# Patient Record
Sex: FEMALE | HISPANIC OR LATINO | Employment: PART TIME
[De-identification: names, ages, dates, MRNs, and addresses within clinical notes are randomized per-mention and may not be internally consistent; named-entity substitution may affect disease eponyms.]

---

## 2017-08-12 ENCOUNTER — HEALTH MAINTENANCE LETTER (OUTPATIENT)
Age: 32
End: 2017-08-12

## 2018-04-23 ENCOUNTER — PRENATAL OFFICE VISIT (OUTPATIENT)
Dept: OBGYN | Facility: CLINIC | Age: 33
End: 2018-04-23
Payer: COMMERCIAL

## 2018-04-23 ENCOUNTER — RADIANT APPOINTMENT (OUTPATIENT)
Dept: ULTRASOUND IMAGING | Facility: CLINIC | Age: 33
End: 2018-04-23
Payer: COMMERCIAL

## 2018-04-23 ENCOUNTER — NURSE TRIAGE (OUTPATIENT)
Dept: NURSING | Facility: CLINIC | Age: 33
End: 2018-04-23

## 2018-04-23 ENCOUNTER — TELEPHONE (OUTPATIENT)
Dept: NURSING | Facility: CLINIC | Age: 33
End: 2018-04-23

## 2018-04-23 VITALS
HEIGHT: 65 IN | BODY MASS INDEX: 29.46 KG/M2 | HEART RATE: 69 BPM | SYSTOLIC BLOOD PRESSURE: 125 MMHG | DIASTOLIC BLOOD PRESSURE: 67 MMHG | WEIGHT: 176.8 LBS

## 2018-04-23 DIAGNOSIS — O36.70X0 MATERNAL CARE FOR VIABLE FETUS IN ABDOMINAL PREGNANCY: ICD-10-CM

## 2018-04-23 DIAGNOSIS — N39.0 UTI (URINARY TRACT INFECTION): Primary | ICD-10-CM

## 2018-04-23 DIAGNOSIS — Z12.4 SCREENING FOR CERVICAL CANCER: ICD-10-CM

## 2018-04-23 DIAGNOSIS — Z34.81 SUPERVISION OF NORMAL INTRAUTERINE PREGNANCY IN MULTIGRAVIDA, FIRST TRIMESTER: Primary | ICD-10-CM

## 2018-04-23 PROBLEM — Z34.80 SUPERVISION OF NORMAL INTRAUTERINE PREGNANCY IN MULTIGRAVIDA: Status: ACTIVE | Noted: 2018-04-23

## 2018-04-23 LAB
ABO + RH BLD: NORMAL
ABO + RH BLD: NORMAL
ALBUMIN UR-MCNC: ABNORMAL MG/DL
APPEARANCE UR: ABNORMAL
BACTERIA #/AREA URNS HPF: ABNORMAL /HPF
BASOPHILS # BLD AUTO: 0 10E9/L (ref 0–0.2)
BASOPHILS NFR BLD AUTO: 0.2 %
BILIRUB UR QL STRIP: NEGATIVE
BLD GP AB SCN SERPL QL: NORMAL
BLOOD BANK CMNT PATIENT-IMP: NORMAL
COLOR UR AUTO: YELLOW
DIFFERENTIAL METHOD BLD: NORMAL
EOSINOPHIL # BLD AUTO: 0.5 10E9/L (ref 0–0.7)
EOSINOPHIL NFR BLD AUTO: 5 %
ERYTHROCYTE [DISTWIDTH] IN BLOOD BY AUTOMATED COUNT: 12.3 % (ref 10–15)
GLUCOSE UR STRIP-MCNC: NEGATIVE MG/DL
HCT VFR BLD AUTO: 39.1 % (ref 35–47)
HGB BLD-MCNC: 13.3 G/DL (ref 11.7–15.7)
HGB UR QL STRIP: NEGATIVE
KETONES UR STRIP-MCNC: ABNORMAL MG/DL
LEUKOCYTE ESTERASE UR QL STRIP: NEGATIVE
LYMPHOCYTES # BLD AUTO: 2.2 10E9/L (ref 0.8–5.3)
LYMPHOCYTES NFR BLD AUTO: 22.4 %
MCH RBC QN AUTO: 30.1 PG (ref 26.5–33)
MCHC RBC AUTO-ENTMCNC: 34 G/DL (ref 31.5–36.5)
MCV RBC AUTO: 89 FL (ref 78–100)
MONOCYTES # BLD AUTO: 0.6 10E9/L (ref 0–1.3)
MONOCYTES NFR BLD AUTO: 6.4 %
NEUTROPHILS # BLD AUTO: 6.6 10E9/L (ref 1.6–8.3)
NEUTROPHILS NFR BLD AUTO: 66 %
NITRATE UR QL: POSITIVE
NON-SQ EPI CELLS #/AREA URNS LPF: ABNORMAL /LPF
PH UR STRIP: 6.5 PH (ref 5–7)
PLATELET # BLD AUTO: 311 10E9/L (ref 150–450)
RBC # BLD AUTO: 4.42 10E12/L (ref 3.8–5.2)
RBC #/AREA URNS AUTO: ABNORMAL /HPF
SOURCE: ABNORMAL
SP GR UR STRIP: 1.02 (ref 1–1.03)
SPECIMEN EXP DATE BLD: NORMAL
UROBILINOGEN UR STRIP-ACNC: 1 EU/DL (ref 0.2–1)
WBC # BLD AUTO: 9.9 10E9/L (ref 4–11)
WBC #/AREA URNS AUTO: ABNORMAL /HPF

## 2018-04-23 PROCEDURE — 86762 RUBELLA ANTIBODY: CPT | Performed by: OBSTETRICS & GYNECOLOGY

## 2018-04-23 PROCEDURE — 87591 N.GONORRHOEAE DNA AMP PROB: CPT | Performed by: OBSTETRICS & GYNECOLOGY

## 2018-04-23 PROCEDURE — 76801 OB US < 14 WKS SINGLE FETUS: CPT | Performed by: OBSTETRICS & GYNECOLOGY

## 2018-04-23 PROCEDURE — 87491 CHLMYD TRACH DNA AMP PROBE: CPT | Performed by: OBSTETRICS & GYNECOLOGY

## 2018-04-23 PROCEDURE — 87086 URINE CULTURE/COLONY COUNT: CPT | Performed by: OBSTETRICS & GYNECOLOGY

## 2018-04-23 PROCEDURE — 2894A VOIDCORRECT: CPT | Performed by: OBSTETRICS & GYNECOLOGY

## 2018-04-23 PROCEDURE — 87624 HPV HI-RISK TYP POOLED RSLT: CPT | Performed by: OBSTETRICS & GYNECOLOGY

## 2018-04-23 PROCEDURE — 87088 URINE BACTERIA CULTURE: CPT | Performed by: OBSTETRICS & GYNECOLOGY

## 2018-04-23 PROCEDURE — 87186 SC STD MICRODIL/AGAR DIL: CPT | Performed by: OBSTETRICS & GYNECOLOGY

## 2018-04-23 PROCEDURE — 81001 URINALYSIS AUTO W/SCOPE: CPT | Performed by: OBSTETRICS & GYNECOLOGY

## 2018-04-23 PROCEDURE — 86850 RBC ANTIBODY SCREEN: CPT | Performed by: OBSTETRICS & GYNECOLOGY

## 2018-04-23 PROCEDURE — 86900 BLOOD TYPING SEROLOGIC ABO: CPT | Performed by: OBSTETRICS & GYNECOLOGY

## 2018-04-23 PROCEDURE — 87389 HIV-1 AG W/HIV-1&-2 AB AG IA: CPT | Performed by: OBSTETRICS & GYNECOLOGY

## 2018-04-23 PROCEDURE — 99207 ZZC FIRST OB VISIT: CPT | Performed by: OBSTETRICS & GYNECOLOGY

## 2018-04-23 PROCEDURE — G0145 SCR C/V CYTO,THINLAYER,RESCR: HCPCS | Performed by: OBSTETRICS & GYNECOLOGY

## 2018-04-23 PROCEDURE — 87340 HEPATITIS B SURFACE AG IA: CPT | Performed by: OBSTETRICS & GYNECOLOGY

## 2018-04-23 PROCEDURE — 36415 COLL VENOUS BLD VENIPUNCTURE: CPT | Performed by: OBSTETRICS & GYNECOLOGY

## 2018-04-23 PROCEDURE — 85025 COMPLETE CBC W/AUTO DIFF WBC: CPT | Performed by: OBSTETRICS & GYNECOLOGY

## 2018-04-23 PROCEDURE — 86780 TREPONEMA PALLIDUM: CPT | Performed by: OBSTETRICS & GYNECOLOGY

## 2018-04-23 PROCEDURE — 86901 BLOOD TYPING SEROLOGIC RH(D): CPT | Performed by: OBSTETRICS & GYNECOLOGY

## 2018-04-23 RX ORDER — CEPHALEXIN 500 MG/1
500 CAPSULE ORAL 3 TIMES DAILY
Qty: 21 CAPSULE | Refills: 0 | Status: SHIPPED | OUTPATIENT
Start: 2018-04-23 | End: 2018-05-07

## 2018-04-23 ASSESSMENT — PATIENT HEALTH QUESTIONNAIRE - PHQ9: 5. POOR APPETITE OR OVEREATING: SEVERAL DAYS

## 2018-04-23 ASSESSMENT — ANXIETY QUESTIONNAIRES
IF YOU CHECKED OFF ANY PROBLEMS ON THIS QUESTIONNAIRE, HOW DIFFICULT HAVE THESE PROBLEMS MADE IT FOR YOU TO DO YOUR WORK, TAKE CARE OF THINGS AT HOME, OR GET ALONG WITH OTHER PEOPLE: SOMEWHAT DIFFICULT
6. BECOMING EASILY ANNOYED OR IRRITABLE: NOT AT ALL
1. FEELING NERVOUS, ANXIOUS, OR ON EDGE: SEVERAL DAYS
2. NOT BEING ABLE TO STOP OR CONTROL WORRYING: MORE THAN HALF THE DAYS
GAD7 TOTAL SCORE: 8
5. BEING SO RESTLESS THAT IT IS HARD TO SIT STILL: NOT AT ALL
3. WORRYING TOO MUCH ABOUT DIFFERENT THINGS: MORE THAN HALF THE DAYS
7. FEELING AFRAID AS IF SOMETHING AWFUL MIGHT HAPPEN: MORE THAN HALF THE DAYS

## 2018-04-23 NOTE — LETTER
May 1, 2018    Felipa Cyr  [address]  [facility] [address]    Dear Felipa,  We are happy to inform you that your PAP smear result from 4/23/18 is normal.  We are now able to do a follow up test on PAP smears. The DNA test is for HPV (Human Papilloma Virus). Cervical cancer is closely linked with certain types of HPV. Your results showed no evidence of high risk HPV.  Therefore we recommend you return in 3 years for your next pap smear.  You will still need to return to the clinic every year for an annual exam and other preventive tests.  Please contact the clinic at [phone] with any questions.  Sincerely,    Jodi Joel MD/naida

## 2018-04-23 NOTE — MR AVS SNAPSHOT
After Visit Summary   4/23/2018    Felipa Cyr    MRN: [id number]           Patient Information     Date Of Birth          1985        Visit Information        Provider Department      4/23/2018 2:00 PM Jodi Joel MD; WE TRIAGE [facility]        Today's Diagnoses     Supervision of normal intrauterine pregnancy in multigravida, first trimester    -  1    Subchorionic hemorrhage of placenta in first trimester, single or unspecified fetus        Screening for cervical cancer           Follow-ups after your visit        Follow-up notes from your care team     Return in about 2 weeks (around 5/7/2018).      Your next 10 appointments already scheduled     May 07, 2018 11:40 AM CDT   ESTABLISHED PRENATAL with Jodi Joel MD   [facility] ([facility])    [address] [place] [id number] [phone]              Future tests that were ordered for you today     Open Future Orders        Priority Expected Expires Ordered    US OB Ltd One Or More Fetus FU/Repeat Routine  4/23/2019 4/23/2018            Who to contact     If you have questions or need follow up information about today's clinic visit or your schedule please contact [facility] [place] directly at [phone].  Normal or non-critical lab and imaging results will be communicated to you by MyChart, letter or phone within 4 business days after the clinic has received the results. If you do not hear from us within 7 days, please contact the clinic through MyChart or phone. If you have a critical or abnormal lab result, we will notify you by phone as soon as possible.  Submit refill requests through "[employer]" or call your pharmacy and they will forward the refill request to us. Please allow 3 business days for your refill to be completed.          Additional Information About Your Visit        MyChart Information   "   [employer] lets you send messages to your doctor, view your test results, renew your prescriptions, schedule appointments and more. To sign up, go to [email or web address] . Click on \"Log in\" on the left side of the screen, which will take you to the Welcome page. Then click on \"Sign up Now\" on the right side of the page.     You will be asked to enter the access code listed below, as well as some personal information. Please follow the directions to create your username and password.     Your access code is: [email or web address]  Expires: 2018  5:45 PM     Your access code will  in 90 days. If you need help or a new code, please call your [place] clinic or [phone].        Care EveryWhere ID     This is your Care EveryWhere ID. This could be used by other organizations to access your [place] medical records  [id number]        Your Vitals Were     Pulse Height Last Period BMI (Body Mass Index)          69 5' 5\" (1.651 m) 2018 29.42 kg/m2         Blood Pressure from Last 3 Encounters:   18 125/67   10/22/14 110/80   05 94/50    Weight from Last 3 Encounters:   18 176 lb 12.8 oz (80.2 kg)   10/22/14 138 lb 3.2 oz (62.7 kg)   05 143 lb (64.9 kg)              We Performed the Following     ABO/Rh type and screen     Anti Treponema     CBC with platelets differential     Chlamydia trachomatis PCR     Hepatitis B surface antigen     HIV Antigen Antibody Combo     HPV High Risk Types DNA Cervical     Neisseria gonorrhoeae PCR     Pap imaged thin layer screen with HPV - recommended age 30 - 65 years (select HPV order below)     Rubella Antibody IgG Quantitative     UA with Microscopic     Urine Culture Aerobic Bacterial        Primary Care Provider Office Phone # Fax #    Dimple Arredondo [phone]       [facility] [address]  [place] [address]        Equal Access to Services     BRO CHAN AH: Hadii tatiana Cerda " reynaldo lopez waxpeter enricoin hayaaaraceli cardenasmicky isauramaribel lajaredaraceli chelly. So [facility] [phone].    ATENCIÓN: Si leyda vegas, tiene a jones disposición servicios gratuitos de asistencia lingüística. Llame al [phone].    We comply with applicable federal civil rights laws and [place] laws. We do not discriminate on the basis of race, color, national origin, age, disability, sex, sexual orientation, or gender identity.            Thank you!     Thank you for choosing [facility]  for your care. Our goal is always to provide you with excellent care. Hearing back from our patients is one way we can continue to improve our services. Please take a few minutes to complete the written survey that you may receive in the mail after your visit with us. Thank you!             Your Updated Medication List - Protect others around you: Learn how to safely use, store and throw away your medicines at www.disposemymeds.org.          This list is accurate as of 4/23/18  5:45 PM.  Always use your most recent med list.                   Brand Name Dispense Instructions for use Diagnosis    IBUPROFEN PO      Take 200 mg by mouth as needed for moderate pain
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
## 2018-04-23 NOTE — TELEPHONE ENCOUNTER
Pt returning call from clinic and reached [facility] instead. Declined triage. Discussed provider note below:    Result Notes   Notes Recorded by Jodi Joel MD on 4/23/2018 at 4:35 PM  Please call the patient with the results. Positive nitrite in the urine with suprapubic pain. Please call patient and send Cephalexin 500mg TID  For 7 days. Thanks!     Jodi Joel MD     This nurse to send Cephalexin script to [facility] at [id number], [address] [place] [address], [phone] per patient request. Educated to nitrites in urine and what a positive result can mean (possible UTI). Caller had no further questions.    Encouraged call back to [facility] 24/7 for nurse line services, new/worsening symptoms or further questions.    Jessica Douglas RN  [place] [facility]      Additional Information    Negative: [1] Caller is not with the adult (patient) AND [2] reporting urgent symptoms    Negative: Lab result questions    Negative: Medication questions    Negative: Caller cannot be reached by phone    Negative: Caller has already spoken to PCP or another triager    Negative: RN needs further essential information from caller in order to complete triage    Negative: Requesting regular office appointment    Negative: [1] Caller requesting NON-URGENT health information AND [2] PCP's office is the best resource    Negative: Health Information question, no triage required and triager able to answer question    Negative: General information question, no triage required and triager able to answer question    Negative: Question about upcoming scheduled test, no triage required and triager able to answer question    Negative: [1] Caller is not with the adult (patient) AND [2] probable NON-URGENT symptoms    [1] Follow-up call to recent contact AND [2] information only call, no triage required     Pt returning call from clinic. Declined triage.    Protocols used: IN[facility] ONLY [facility]

## 2018-04-23 NOTE — TELEPHONE ENCOUNTER
Pt returning call from clinic and reached [facility] instead. Declined triage. Discussed provider note below:    Result Notes   Notes Recorded by Jodi Joel MD on 4/23/2018 at 4:35 PM  Please call the patient with the results. Positive nitrite in the urine with suprapubic pain. Please call patient and send Cephalexin 500mg TID  For 7 days. Thanks!     Jodi Joel MD    This nurse to send Cephalexin script to [facility] at [id number], [address] [place] [address], [phone] per patient request. Educated to nitrites in urine and what a positive result can mean (possible UTI). Caller had no further questions.    Encouraged call back to [facility] 24/7 for nurse line services, new/worsening symptoms or further questions.    Jessica Douglas RN  [place] [facility]

## 2018-04-23 NOTE — TELEPHONE ENCOUNTER
Left message informing pt. Not able to send , need pharmacy. LMTCB      Please call the patient with the results. Positive nitrite in the urine with suprapubic pain. Please call patient and send Cephalexin 500mg TID  For 7 days. Thanks!

## 2018-04-23 NOTE — NURSING NOTE
[facility] Obstetrical Risk History    Patient presents for new OB labs and teaching.      1. Please indicate any condition you have or have had in the past:  Herpes  2. Do you smoke?  No  If yes, how many packs/day?   3. Do you drink alcoholic beverages? No  If yes, how often?  What type?   4. List any medications taken since your last period: Ibuprofen  5.   6. List any recreational drugs (cocaine, marijuana, etc. used since your last period:None    7. List any chemical or radiation exposure that you've encountered: None  8. Are you on a restricted diet? No  If yes, please describe:  Do you have any Nondenominational objections to any form of treatment? No    GENETIC SCREENING    These questions apply to you, the baby's father, or anyone in either family with:    1. Patient's age 35 or greater at delivery? No  2. Kazakh, Portuguese, Mediterranean ancestry? No  3. Neural Tube Defect (Meningomyelocele, Spina Bifida, or Anencephaly)? No  4. Uatsdin, Nepali Luxembourger or history of Frank-Sachs disease? No  5. Down's Syndrome?   No  6. Hemophilia or clotting disorder? No  7. Muscular Dystrophy? No  8. Cystic Fibrosis? No  9. [place]'s Chorea? No  10. Mental Retardation? No  11. 3 or more miscarriages or a stillborn? No  12. Other inherited disease or chromosomal disorder? No  13. Have you or the baby's father had a child born with a birth defect? No  14. Did you or the baby's father have a birth defect yourselves? No    Do you have any other concerns about birth defects? No

## 2018-04-24 ENCOUNTER — TELEPHONE (OUTPATIENT)
Dept: NURSING | Facility: CLINIC | Age: 33
End: 2018-04-24

## 2018-04-24 LAB
HBV SURFACE AG SERPL QL IA: NONREACTIVE
HIV 1+2 AB+HIV1 P24 AG SERPL QL IA: NONREACTIVE

## 2018-04-24 ASSESSMENT — PATIENT HEALTH QUESTIONNAIRE - PHQ9: SUM OF ALL RESPONSES TO PHQ QUESTIONS 1-9: 5

## 2018-04-24 ASSESSMENT — ANXIETY QUESTIONNAIRES: GAD7 TOTAL SCORE: 8

## 2018-04-24 NOTE — TELEPHONE ENCOUNTER
Pt calling to make sure Rx sent for UTI is OK with pregnancy. Pt is concerned about trying to get large pills down. Informed pt to take with pudding or yogurt. No further questions.

## 2018-04-25 LAB
RUBV IGG SERPL IA-ACNC: 31 IU/ML
T PALLIDUM IGG+IGM SER QL: NEGATIVE

## 2018-04-26 LAB
COPATH REPORT: NORMAL
PAP: NORMAL

## 2018-04-27 LAB
BACTERIA SPEC CULT: ABNORMAL
BACTERIA SPEC CULT: ABNORMAL
C TRACH DNA SPEC QL NAA+PROBE: NEGATIVE
Lab: ABNORMAL
N GONORRHOEA DNA SPEC QL NAA+PROBE: NEGATIVE
SPECIMEN SOURCE: ABNORMAL
SPECIMEN SOURCE: NORMAL
SPECIMEN SOURCE: NORMAL

## 2018-04-30 LAB
FINAL DIAGNOSIS: NORMAL
HPV HR 12 DNA CVX QL NAA+PROBE: NEGATIVE
HPV16 DNA SPEC QL NAA+PROBE: NEGATIVE
HPV18 DNA SPEC QL NAA+PROBE: NEGATIVE
SPECIMEN DESCRIPTION: NORMAL
SPECIMEN SOURCE CVX/VAG CYTO: NORMAL

## 2018-05-07 ENCOUNTER — PRENATAL OFFICE VISIT (OUTPATIENT)
Dept: OBGYN | Facility: CLINIC | Age: 33
End: 2018-05-07
Payer: COMMERCIAL

## 2018-05-07 ENCOUNTER — TRANSFERRED RECORDS (OUTPATIENT)
Dept: HEALTH INFORMATION MANAGEMENT | Facility: CLINIC | Age: 33
End: 2018-05-07

## 2018-05-07 VITALS — BODY MASS INDEX: 29.29 KG/M2 | SYSTOLIC BLOOD PRESSURE: 116 MMHG | WEIGHT: 176 LBS | DIASTOLIC BLOOD PRESSURE: 70 MMHG

## 2018-05-07 DIAGNOSIS — O23.41 URINARY TRACT INFECTION IN MOTHER DURING FIRST TRIMESTER OF PREGNANCY: ICD-10-CM

## 2018-05-07 DIAGNOSIS — Z34.81 SUPERVISION OF NORMAL INTRAUTERINE PREGNANCY IN MULTIGRAVIDA IN FIRST TRIMESTER: Primary | ICD-10-CM

## 2018-05-07 LAB — MISCELLANEOUS TEST: NORMAL

## 2018-05-07 PROCEDURE — 40000791 ZZHCL STATISTIC VERIFI PRENATAL TRISOMY 21,18,13: Mod: 90 | Performed by: OBSTETRICS & GYNECOLOGY

## 2018-05-07 PROCEDURE — 36415 COLL VENOUS BLD VENIPUNCTURE: CPT | Performed by: OBSTETRICS & GYNECOLOGY

## 2018-05-07 PROCEDURE — 99000 SPECIMEN HANDLING OFFICE-LAB: CPT | Performed by: OBSTETRICS & GYNECOLOGY

## 2018-05-07 PROCEDURE — 99207 ZZC PRENATAL VISIT: CPT | Performed by: OBSTETRICS & GYNECOLOGY

## 2018-05-07 PROCEDURE — 87086 URINE CULTURE/COLONY COUNT: CPT | Performed by: OBSTETRICS & GYNECOLOGY

## 2018-05-07 NOTE — MR AVS SNAPSHOT
"              After Visit Summary   5/7/2018    Felipa Cyr    MRN: [id number]           Patient Information     Date Of Birth          1985        Visit Information        Provider Department      5/7/2018 11:40 AM Jodi Joel MD [facility]        Today's Diagnoses     Supervision of normal intrauterine pregnancy in multigravida in first trimester    -  1    UTI in pregnancy           Follow-ups after your visit        Your next 10 appointments already scheduled     May 29, 2018  4:20 PM CDT   ESTABLISHED PRENATAL with Jodi Joel MD   [facility] ([facility])    [address] [place] [address] [phone]              Who to contact     If you have questions or need follow up information about today's clinic visit or your schedule please contact [facility] directly at [phone].  Normal or non-critical lab and imaging results will be communicated to you by MyChart, letter or phone within 4 business days after the clinic has received the results. If you do not hear from us within 7 days, please contact the clinic through [employer] or phone. If you have a critical or abnormal lab result, we will notify you by phone as soon as possible.  Submit refill requests through [employer] or call your pharmacy and they will forward the refill request to us. Please allow 3 business days for your refill to be completed.          Additional Information About Your Visit        Rheti Inchart Information     [employer] lets you send messages to your doctor, view your test results, renew your prescriptions, schedule appointments and more. To sign up, go to [email or web address] . Click on \"Log in\" on the left side of the screen, which will take you to the Welcome page. Then click on \"Sign up Now\" on the right side of the page.     You will be asked to enter the access code listed below, as " well as some personal information. Please follow the directions to create your username and password.     Your access code is: [email or web address]  Expires: 2018  5:45 PM     Your access code will  in 90 days. If you need help or a new code, please call your [place] clinic or [phone].        Care EveryWhere ID     This is your Care EveryWhere ID. This could be used by other organizations to access your [place] medical records  [id number]        Your Vitals Were     Last Period Breastfeeding? BMI (Body Mass Index)             2018 No 29.29 kg/m2          Blood Pressure from Last 3 Encounters:   18 116/70   18 125/67   10/22/14 110/80    Weight from Last 3 Encounters:   18 176 lb (79.8 kg)   18 176 lb 12.8 oz (80.2 kg)   10/22/14 138 lb 3.2 oz (62.7 kg)              We Performed the Following     Non Invasive Prenatal Test Cell Free DNA     Send outs misc test     Standard panel: Laboratory Miscellaneous Order     Urine Culture Aerobic Bacterial        Primary Care Provider Office Phone # Fax #    Belle Gladerigoberto Arredondo [phone]       [facility] [address] [address]        Equal Access to Services     RICA CHAN AH: Hadii aad ku hadasho Soomaali, waaxda luqadaha, qaybta kaalmada adeegyada, cailin mckee hayphilip nye . So [facility] [phone].    ATENCIÓN: Si habla español, tiene a jones disposición servicios gratuitos de asistencia lingüística. Llame al [phone].    We comply with applicable federal civil rights laws and [place] laws. We do not discriminate on the basis of race, color, national origin, age, disability, sex, sexual orientation, or gender identity.            Thank you!     Thank you for choosing [facility]  for your care. Our goal is always to provide you with excellent care. Hearing back from our patients is one way we can continue to improve our services. Please take a  few minutes to complete the written survey that you may receive in the mail after your visit with us. Thank you!             Your Updated Medication List - Protect others around you: Learn how to safely use, store and throw away your medicines at www.disposemymeds.org.          This list is accurate as of 5/7/18 12:19 PM.  Always use your most recent med list.                   Brand Name Dispense Instructions for use Diagnosis    IBUPROFEN PO      Take 200 mg by mouth as needed for moderate pain
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
## 2018-05-08 LAB
BACTERIA SPEC CULT: NO GROWTH
Lab: NORMAL
SPECIMEN SOURCE: NORMAL

## 2018-05-15 LAB
LOCATION PERFORMED: NORMAL
NON INVASIVE PRENATAL TEST CELL FREE DNA: NORMAL
NORMAL RANGE FOR SEND OUTS MISC TEST: NORMAL
RESULT: NORMAL
SEND OUTS MISC TEST CODE: NORMAL
SEND OUTS MISC TEST SPECIMEN: NORMAL
TEST NAME: NORMAL

## 2018-05-16 ENCOUNTER — TELEPHONE (OUTPATIENT)
Dept: NURSING | Facility: CLINIC | Age: 33
End: 2018-05-16

## 2018-05-16 DIAGNOSIS — Z34.80 SUPERVISION OF NORMAL INTRAUTERINE PREGNANCY IN MULTIGRAVIDA: ICD-10-CM

## 2018-05-16 NOTE — TELEPHONE ENCOUNTER
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 Pt verbalized understanding and no questions at this time.  Routing to Provider as CARLINE RG RN

## 2018-05-16 NOTE — TELEPHONE ENCOUNTER
LMTCB on voicemail for progenity screen results. Due to CTC did reassure her that results are normal but to call back to discuss more information. DID NOT reveal sex. Harry RG RN

## 2018-05-29 ENCOUNTER — PRENATAL OFFICE VISIT (OUTPATIENT)
Dept: OBGYN | Facility: CLINIC | Age: 33
End: 2018-05-29
Payer: COMMERCIAL

## 2018-05-29 VITALS — WEIGHT: 176 LBS | SYSTOLIC BLOOD PRESSURE: 112 MMHG | BODY MASS INDEX: 29.29 KG/M2 | DIASTOLIC BLOOD PRESSURE: 64 MMHG

## 2018-05-29 DIAGNOSIS — Z34.82 SUPERVISION OF NORMAL INTRAUTERINE PREGNANCY IN MULTIGRAVIDA IN SECOND TRIMESTER: ICD-10-CM

## 2018-05-29 PROCEDURE — 99207 ZZC PRENATAL VISIT: CPT | Performed by: OBSTETRICS & GYNECOLOGY

## 2018-05-29 NOTE — PROGRESS NOTES
Doing well. Here with the FOB today--TC. Having stretching pain in the upper abdomen. Reassurance given. Asked to use Simethicone at night time. RTC in 4 weeks. AFP at the next visit. Anatomy sonogram at 20 weeks.  Jodi Joel MD

## 2018-05-29 NOTE — MR AVS SNAPSHOT
"              After Visit Summary   5/29/2018    Felipa Cyr    MRN: [id number]           Patient Information     Date Of Birth          1985        Visit Information        Provider Department      5/29/2018 4:20 PM Jodi Joel MD [facility] [place]        Today's Diagnoses     Supervision of normal intrauterine pregnancy in multigravida in second trimester           Follow-ups after your visit        Follow-up notes from your care team     Return in about 4 weeks (around 6/26/2018).      Your next 10 appointments already scheduled     Jun 19, 2018  8:30 AM CDT   ESTABLISHED PRENATAL with Jodi Joel MD   [facility] [address] [facility] ([facility])    [address] [place] [address] [id number] [phone]              Who to contact     If you have questions or need follow up information about today's clinic visit or your schedule please contact [facility] [place] directly at [phone].  Normal or non-critical lab and imaging results will be communicated to you by Hop Skip Connecthart, letter or phone within 4 business days after the clinic has received the results. If you do not hear from us within 7 days, please contact the clinic through Adlyt or phone. If you have a critical or abnormal lab result, we will notify you by phone as soon as possible.  Submit refill requests through I Just Shared or call your pharmacy and they will forward the refill request to us. Please allow 3 business days for your refill to be completed.          Additional Information About Your Visit        Hop Skip Connecthart Information     I Just Shared lets you send messages to your doctor, view your test results, renew your prescriptions, schedule appointments and more. To sign up, go to [email or web address] . Click on \"Log in\" on the left side of the screen, which will take you to the Welcome page. Then click on \"Sign up Now\" on the right side of the page. "     You will be asked to enter the access code listed below, as well as some personal information. Please follow the directions to create your username and password.     Your access code is: [email or web address]  Expires: 2018  3:52 PM     Your access code will  in 90 days. If you need help or a new code, please call your [place] clinic or [phone].        Care EveryWhere ID     This is your Care EveryWhere ID. This could be used by other organizations to access your [place] medical records  [id number]        Your Vitals Were     Last Period BMI (Body Mass Index)                2018 29.29 kg/m2           Blood Pressure from Last 3 Encounters:   18 112/64   18 116/70   18 125/67    Weight from Last 3 Encounters:   18 176 lb (79.8 kg)   18 176 lb (79.8 kg)   18 176 lb 12.8 oz (80.2 kg)              Today, you had the following     No orders found for display       Primary Care Provider Office Phone # Fax #    Dimple Arredondo [phone]       [facility] [address] [address]        Equal Access to Services     [facility] : Hadii aad ku hadasho Soomaali, waaxda luqadaha, qaybta kaalmada adeegyada, waxay rafi nye . So [facility] [phone].    ATENCIÓN: Si habla español, tiene a jones disposición servicios gratuitos de asistencia lingüística. Llame al [phone].    We comply with applicable federal civil rights laws and [place] laws. We do not discriminate on the basis of race, color, national origin, age, disability, sex, sexual orientation, or gender identity.            Thank you!     Thank you for choosing [facility]  for your care. Our goal is always to provide you with excellent care. Hearing back from our patients is one way we can continue to improve our services. Please take a few minutes to complete the written survey that you may receive in the mail after  your visit with us. Thank you!             Your Updated Medication List - Protect others around you: Learn how to safely use, store and throw away your medicines at www.disposemymeds.org.          This list is accurate as of 5/29/18  4:43 PM.  Always use your most recent med list.                   Brand Name Dispense Instructions for use Diagnosis    PRENATAL VITAMIN PO

## 2018-06-11 ENCOUNTER — TELEPHONE (OUTPATIENT)
Dept: OBGYN | Facility: CLINIC | Age: 33
End: 2018-06-11

## 2018-06-11 NOTE — TELEPHONE ENCOUNTER
Returned pt call regarding URI sx's: headaches, sore through and aches. Denies fever.  Inquiring what she can take.   Advised on Tylenol every 4-6 hours depending on regular or extra strength, making sure not to exceed 4000 gm over 24 hour period.  Sore throat: warm salt water gargles, throat lozenges ok. Sudafed ok for head or nasal congestion. If allergy related may take benadryl, claritin or zyrtec.  Encouraged to increase PO fluids, juice such as OJ is ok for vit c and rest.  Pt verbalized understanding and no further questions.  Flory RG RN

## 2018-06-19 ENCOUNTER — PRENATAL OFFICE VISIT (OUTPATIENT)
Dept: OBGYN | Facility: CLINIC | Age: 33
End: 2018-06-19
Payer: COMMERCIAL

## 2018-06-19 VITALS — WEIGHT: 177 LBS | DIASTOLIC BLOOD PRESSURE: 68 MMHG | SYSTOLIC BLOOD PRESSURE: 118 MMHG | BODY MASS INDEX: 29.45 KG/M2

## 2018-06-19 DIAGNOSIS — Z34.82 SUPERVISION OF NORMAL INTRAUTERINE PREGNANCY IN MULTIGRAVIDA IN SECOND TRIMESTER: Primary | ICD-10-CM

## 2018-06-19 PROCEDURE — 99207 ZZC PRENATAL VISIT: CPT | Performed by: OBSTETRICS & GYNECOLOGY

## 2018-06-19 PROCEDURE — 99000 SPECIMEN HANDLING OFFICE-LAB: CPT | Performed by: OBSTETRICS & GYNECOLOGY

## 2018-06-19 PROCEDURE — 82105 ALPHA-FETOPROTEIN SERUM: CPT | Mod: 90 | Performed by: OBSTETRICS & GYNECOLOGY

## 2018-06-19 PROCEDURE — 36415 COLL VENOUS BLD VENIPUNCTURE: CPT | Performed by: OBSTETRICS & GYNECOLOGY

## 2018-06-19 NOTE — MR AVS SNAPSHOT
After Visit Summary   6/19/2018    Felipa Cyr    MRN: [id number]           Patient Information     Date Of Birth          1985        Visit Information        Provider Department      6/19/2018 8:30 AM Jodi Joel MD [facility] [place]        Today's Diagnoses     Supervision of normal intrauterine pregnancy in multigravida in second trimester    -  1       Follow-ups after your visit        Follow-up notes from your care team     Return in about 4 weeks (around 7/17/2018).      Your next 10 appointments already scheduled     Jul 17, 2018  3:00 PM CDT   US OB > 14 WEEKS COMPLETE SINGLE with WEUS2   [facility] ([facility])    [address]  [facility] [id number] [phone]           Please bring a list of your medicines (including vitamins, minerals and over-the-counter drugs). Also, tell your doctor about any allergies you may have. Wear comfortable clothes and leave your valuables at home.  If you re less than 20 weeks drink four 8-ounce glasses of fluid an hour before your exam. If you need to empty your bladder before your exam, try to release only a little urine. Then, drink another glass of fluid.  You may have up to two family members in the exam room. If you bring a small child, an adult must be there to care for him or her.  Please call the Imaging Department at your exam site with any questions.            Jul 17, 2018  4:00 PM CDT   ESTABLISHED PRENATAL with Jodi Joel MD   [facility] ([facility] [place])    [address]  [facility] [address] [phone]              Future tests that were ordered for you today     Open Future Orders        Priority Expected Expires Ordered    US OB > 14 Weeks Complete Single Routine  6/19/2019 6/19/2018            Who to contact     If you have questions or need follow up  "information about today's clinic visit or your schedule please contact [facility] directly at [phone].  Normal or non-critical lab and imaging results will be communicated to you by MyChart, letter or phone within 4 business days after the clinic has received the results. If you do not hear from us within 7 days, please contact the clinic through Wellcentivehart or phone. If you have a critical or abnormal lab result, we will notify you by phone as soon as possible.  Submit refill requests through Consorte Media or call your pharmacy and they will forward the refill request to us. Please allow 3 business days for your refill to be completed.          Additional Information About Your Visit        Wellcentivehart Information     Consorte Media lets you send messages to your doctor, view your test results, renew your prescriptions, schedule appointments and more. To sign up, go to [email or web address] . Click on \"Log in\" on the left side of the screen, which will take you to the Welcome page. Then click on \"Sign up Now\" on the right side of the page.     You will be asked to enter the access code listed below, as well as some personal information. Please follow the directions to create your username and password.     Your access code is: [email or web address]  Expires: 2018  3:52 PM     Your access code will  in 90 days. If you need help or a new code, please call your [place] clinic or [phone].        Care EveryWhere ID     This is your Care EveryWhere ID. This could be used by other organizations to access your [place] medical records  [id number]        Your Vitals Were     Last Period BMI (Body Mass Index)                2018 29.45 kg/m2           Blood Pressure from Last 3 Encounters:   18 118/68   18 112/64   18 116/70    Weight from Last 3 Encounters:   18 177 lb (80.3 kg)   18 176 lb (79.8 kg)   18 176 lb (79.8 kg)              We Performed the Following     " Alpha fetoprotein maternal screen        Primary Care Provider Office Phone # Fax #    Dimple Karina Arredondo [phone]       [facility] [address] [facility] [address]  [facility] [address]        Equal Access to Services     BRO CHAN : Hadii augie montes hadshirleneo Soomaali, waaxda luqadaha, qaybta kaalmada adeegyada, cailin delarosacliffflavia nye . So [facility] [phone].    ATENCIÓN: Si habla español, tiene a jones disposición servicios gratuitos de asistencia lingüística. Llame al [phone].    We comply with applicable federal civil rights laws and [place] laws. We do not discriminate on the basis of race, color, national origin, age, disability, sex, sexual orientation, or gender identity.            Thank you!     Thank you for choosing [facility]  for your care. Our goal is always to provide you with excellent care. Hearing back from our patients is one way we can continue to improve our services. Please take a few minutes to complete the written survey that you may receive in the mail after your visit with us. Thank you!             Your Updated Medication List - Protect others around you: Learn how to safely use, store and throw away your medicines at www.disposemymeds.org.          This list is accurate as of 6/19/18  9:40 AM.  Always use your most recent med list.                   Brand Name Dispense Instructions for use Diagnosis    PRENATAL VITAMIN PO

## 2018-06-19 NOTE — PROGRESS NOTES
Doing well. No concerns. Still living with her mother. Questions about laying on her back and about the SHALOM. Reassurance given. AFP today. Anatomy sonogram at her next visit. Orders placed.  RTC in 4 weeks.  Jodi Joel MD

## 2018-06-21 LAB
# FETUSES US: NORMAL
# FETUSES: 1
AFP ADJ MOM AMN: 0.74
AFP SERPL-MCNC: 23 NG/ML
AGE - REPORTED: 34.4 YR
CURRENT SMOKER: NO
CURRENT SMOKER: NO
DIABETES STATUS PATIENT: NO
FAMILY MEMBER DISEASES HX: NO
FAMILY MEMBER DISEASES HX: NO
GA METHOD: NORMAL
GA METHOD: NORMAL
GA: NORMAL WK
IDDM PATIENT QL: NO
INTEGRATED SCN PATIENT-IMP: NORMAL
LMP START DATE: NORMAL
MONOCHORIONIC TWINS: NO
SERVICE CMNT-IMP: NO
SPECIMEN DRAWN SERPL: NORMAL
VALPROIC/CARBAMAZEPINE STATUS: NO
WEIGHT UNITS: NORMAL

## 2018-06-27 ENCOUNTER — TELEPHONE (OUTPATIENT)
Dept: OBGYN | Facility: CLINIC | Age: 33
End: 2018-06-27

## 2018-06-27 NOTE — TELEPHONE ENCOUNTER
Pt calling for lab results from 6/19/18. Informed her the AFP was normal, no increased risk of neural tube defects. Pt updated her phone number in EPIC with the .  Pt verbalized understanding and no further questions.

## 2018-07-17 ENCOUNTER — PRENATAL OFFICE VISIT (OUTPATIENT)
Dept: OBGYN | Facility: CLINIC | Age: 33
End: 2018-07-17
Attending: OBSTETRICS & GYNECOLOGY
Payer: COMMERCIAL

## 2018-07-17 ENCOUNTER — RADIANT APPOINTMENT (OUTPATIENT)
Dept: ULTRASOUND IMAGING | Facility: CLINIC | Age: 33
End: 2018-07-17
Attending: OBSTETRICS & GYNECOLOGY
Payer: COMMERCIAL

## 2018-07-17 VITALS — WEIGHT: 177 LBS | DIASTOLIC BLOOD PRESSURE: 68 MMHG | BODY MASS INDEX: 29.45 KG/M2 | SYSTOLIC BLOOD PRESSURE: 100 MMHG

## 2018-07-17 DIAGNOSIS — O34.219 PREVIOUS CESAREAN DELIVERY, ANTEPARTUM: ICD-10-CM

## 2018-07-17 DIAGNOSIS — Z34.82 SUPERVISION OF NORMAL INTRAUTERINE PREGNANCY IN MULTIGRAVIDA IN SECOND TRIMESTER: ICD-10-CM

## 2018-07-17 DIAGNOSIS — Z34.82 SUPERVISION OF NORMAL INTRAUTERINE PREGNANCY IN MULTIGRAVIDA IN SECOND TRIMESTER: Primary | ICD-10-CM

## 2018-07-17 PROCEDURE — 99207 ZZC PRENATAL VISIT: CPT | Performed by: OBSTETRICS & GYNECOLOGY

## 2018-07-17 PROCEDURE — 76805 OB US >/= 14 WKS SNGL FETUS: CPT | Performed by: OBSTETRICS & GYNECOLOGY

## 2018-07-17 NOTE — MR AVS SNAPSHOT
"              After Visit Summary   2018    Felipa Cyr    MRN: [id number]           Patient Information     Date Of Birth          1985        Visit Information        Provider Department      2018 4:00 PM Jodi Joel MD [facility] [place]        Today's Diagnoses     Supervision of normal intrauterine pregnancy in multigravida in second trimester    -  1    Previous  delivery, antepartum           Follow-ups after your visit        Follow-up notes from your care team     Return in about 4 weeks (around 2018).      Your next 10 appointments already scheduled     2018  4:00 PM CDT   ESTABLISHED PRENATAL with Jodi Joel MD   [facility] ([facility])    [address] [place] [place] [address] [phone]              Who to contact     If you have questions or need follow up information about today's clinic visit or your schedule please contact [facility] directly at [phone].  Normal or non-critical lab and imaging results will be communicated to you by MyChart, letter or phone within 4 business days after the clinic has received the results. If you do not hear from us within 7 days, please contact the clinic through SHINE Medical Technologieshart or phone. If you have a critical or abnormal lab result, we will notify you by phone as soon as possible.  Submit refill requests through VuCast Media or call your pharmacy and they will forward the refill request to us. Please allow 3 business days for your refill to be completed.          Additional Information About Your Visit        SHINE Medical Technologieshart Information     VuCast Media lets you send messages to your doctor, view your test results, renew your prescriptions, schedule appointments and more. To sign up, go to [email or web address] . Click on \"Log in\" on the left side of the screen, which will take you to the Welcome page. Then click " "on \"Sign up Now\" on the right side of the page.     You will be asked to enter the access code listed below, as well as some personal information. Please follow the directions to create your username and password.     Your access code is: [email or web address]  Expires: 10/15/2018  2:53 PM     Your access code will  in 90 days. If you need help or a new code, please call your [place] clinic or [phone].        Care EveryWhere ID     This is your Care EveryWhere ID. This could be used by other organizations to access your [place] medical records  [id number]        Your Vitals Were     Last Period BMI (Body Mass Index)                2018 29.45 kg/m2           Blood Pressure from Last 3 Encounters:   18 100/68   18 118/68   18 112/64    Weight from Last 3 Encounters:   18 177 lb (80.3 kg)   18 177 lb (80.3 kg)   18 176 lb (79.8 kg)              Today, you had the following     No orders found for display       Primary Care Provider Office Phone # Fax #    Dimple Arredondo [phone]       [facility] [address] [address]        Equal Access to Services     [facility]: Hadii augie ku hadasho Soross, waaxda luqadaha, qaybta kaalmada adeegyada, cailin nye . So [facility] [phone].    ATENCIÓN: Si habla español, tiene a jones disposición servicios gratuitos de asistencia lingüística. Llame al [phone].    We comply with applicable federal civil rights laws and [place] laws. We do not discriminate on the basis of race, color, national origin, age, disability, sex, sexual orientation, or gender identity.            Thank you!     Thank you for choosing [facility]  for your care. Our goal is always to provide you with excellent care. Hearing back from our patients is one way we can continue to improve our services. Please take a few minutes to complete the written " survey that you may receive in the mail after your visit with us. Thank you!             Your Updated Medication List - Protect others around you: Learn how to safely use, store and throw away your medicines at www.disposemymeds.org.          This list is accurate as of 7/17/18  3:44 PM.  Always use your most recent med list.                   Brand Name Dispense Instructions for use Diagnosis    PRENATAL VITAMIN PO

## 2018-07-17 NOTE — PROGRESS NOTES
Prenatal Visit: doing well. Fetal movement wakes her up from sleep. She states that she has been craving spicy food and wants to make sure this is ok.  Anatomy sonogram wnl. Reassurance given regarding testing to date. Still desires TOLAC. Bleeding precautions given. RTC in 4 weeks  Jodi Joel MD

## 2018-08-14 ENCOUNTER — PRENATAL OFFICE VISIT (OUTPATIENT)
Dept: OBGYN | Facility: CLINIC | Age: 33
End: 2018-08-14
Payer: COMMERCIAL

## 2018-08-14 VITALS — WEIGHT: 178.8 LBS | BODY MASS INDEX: 29.75 KG/M2 | SYSTOLIC BLOOD PRESSURE: 100 MMHG | DIASTOLIC BLOOD PRESSURE: 68 MMHG

## 2018-08-14 DIAGNOSIS — O26.899 ABDOMINAL CRAMPING AFFECTING PREGNANCY, ANTEPARTUM: ICD-10-CM

## 2018-08-14 DIAGNOSIS — R10.9 ABDOMINAL CRAMPING AFFECTING PREGNANCY, ANTEPARTUM: ICD-10-CM

## 2018-08-14 DIAGNOSIS — N89.8 VAGINAL DISCHARGE: ICD-10-CM

## 2018-08-14 DIAGNOSIS — Z34.82 SUPERVISION OF NORMAL INTRAUTERINE PREGNANCY IN MULTIGRAVIDA IN SECOND TRIMESTER: Primary | ICD-10-CM

## 2018-08-14 LAB
ALBUMIN UR-MCNC: ABNORMAL MG/DL
APPEARANCE UR: CLEAR
BACTERIA #/AREA URNS HPF: ABNORMAL /HPF
BILIRUB UR QL STRIP: NEGATIVE
COLOR UR AUTO: YELLOW
GLUCOSE UR STRIP-MCNC: NEGATIVE MG/DL
GRAM STN SPEC: ABNORMAL
HGB UR QL STRIP: NEGATIVE
KETONES UR STRIP-MCNC: ABNORMAL MG/DL
LEUKOCYTE ESTERASE UR QL STRIP: NEGATIVE
MUCOUS THREADS #/AREA URNS LPF: PRESENT /LPF
NITRATE UR QL: NEGATIVE
NON-SQ EPI CELLS #/AREA URNS LPF: ABNORMAL /LPF
PH UR STRIP: 5.5 PH (ref 5–7)
RBC #/AREA URNS AUTO: ABNORMAL /HPF
SOURCE: ABNORMAL
SP GR UR STRIP: >1.03 (ref 1–1.03)
SPECIMEN SOURCE: ABNORMAL
UROBILINOGEN UR STRIP-ACNC: 0.2 EU/DL (ref 0.2–1)
WBC #/AREA URNS AUTO: ABNORMAL /HPF

## 2018-08-14 PROCEDURE — 99207 ZZC PRENATAL VISIT: CPT | Performed by: OBSTETRICS & GYNECOLOGY

## 2018-08-14 PROCEDURE — 81001 URINALYSIS AUTO W/SCOPE: CPT | Performed by: OBSTETRICS & GYNECOLOGY

## 2018-08-14 PROCEDURE — 87086 URINE CULTURE/COLONY COUNT: CPT | Performed by: OBSTETRICS & GYNECOLOGY

## 2018-08-14 PROCEDURE — 87205 SMEAR GRAM STAIN: CPT | Performed by: OBSTETRICS & GYNECOLOGY

## 2018-08-14 NOTE — MR AVS SNAPSHOT
"              After Visit Summary   8/14/2018    Felipa Cyr    MRN: [id number]           Patient Information     Date Of Birth          1985        Visit Information        Provider Department      8/14/2018 2:40 PM Jodi Joel MD [facility]        Today's Diagnoses     Supervision of normal intrauterine pregnancy in multigravida in second trimester    -  1    Abdominal cramping affecting pregnancy, antepartum        Vaginal discharge           Follow-ups after your visit        Follow-up notes from your care team     Return in about 4 weeks (around 9/11/2018).      Who to contact     If you have questions or need follow up information about today's clinic visit or your schedule please contact [facility] at [phone].  Normal or non-critical lab and imaging results will be communicated to you by MyChart, letter or phone within 4 business days after the clinic has received the results. If you do not hear from us within 7 days, please contact the clinic through MyChart or phone. If you have a critical or abnormal lab result, we will notify you by phone as soon as possible.  Submit refill requests through Likva or call your pharmacy and they will forward the refill request to us. Please allow 3 business days for your refill to be completed.          Additional Information About Your Visit        MyChart Information     Likva lets you send messages to your doctor, view your test results, renew your prescriptions, schedule appointments and more. To sign up, go to [email or web address] . Click on \"Log in\" on the left side of the screen, which will take you to the Welcome page. Then click on \"Sign up Now\" on the right side of the page.     You will be asked to enter the access code listed below, as well as some personal information. Please follow the directions to create your username and password.     Your access code is: " [email or web address]  Expires: 10/15/2018  2:53 PM     Your access code will  in 90 days. If you need help or a new code, please call your [place] clinic or [phone].        Care EveryWhere ID     This is your Care EveryWhere ID. This could be used by other organizations to access your [place] medical records  [id number]        Your Vitals Were     Last Period BMI (Body Mass Index)                2018 29.75 kg/m2           Blood Pressure from Last 3 Encounters:   18 100/68   18 100/68   18 118/68    Weight from Last 3 Encounters:   18 178 lb 12.8 oz (81.1 kg)   18 177 lb (80.3 kg)   18 177 lb (80.3 kg)              We Performed the Following     Gram stain     UA with Microscopic reflex to Culture        Primary Care Provider Office Phone # Fax #    Dimple Karina Arredondo [phone]       [facility] [address] [address]        Equal Access to Services     [facility]: Hadii aad ku hadasho Soross, waaxda luqadaha, qaybta kaalkinza lazo, cailin nye . So [facility] [phone].    ATENCIÓN: Si habla español, tiene a jones disposición servicios gratuitos de asistencia lingüística. [facility] [phone].    We comply with applicable federal civil rights laws and [place] laws. We do not discriminate on the basis of race, color, national origin, age, disability, sex, sexual orientation, or gender identity.            Thank you!     Thank you for choosing [facility]  for your care. Our goal is always to provide you with excellent care. Hearing back from our patients is one way we can continue to improve our services. Please take a few minutes to complete the written survey that you may receive in the mail after your visit with us. Thank you!             Your Updated Medication List - Protect others around you: Learn how to safely use, store and throw away your medicines at  www.disposemymeds.org.          This list is accurate as of 8/14/18  3:12 PM.  Always use your most recent med list.                   Brand Name Dispense Instructions for use Diagnosis    PRENATAL VITAMIN PO

## 2018-08-14 NOTE — PROGRESS NOTES
Has noted some cramps with increased movement. Usually resolves with rest. Has not had any painful urination and has not had any abnormal vaginal discharge.   SSE: closed/long/high. Thin off white copious vaginal discharge. Will await gram stain results.  Will also send UA with reflex to culture  Reassurance given, PTL labor precautions given.  RTC in 4 weeks for glucola.  Jodi Joel MD

## 2018-08-16 DIAGNOSIS — N76.0 BACTERIAL VAGINOSIS: Primary | ICD-10-CM

## 2018-08-16 DIAGNOSIS — B96.89 BACTERIAL VAGINOSIS: Primary | ICD-10-CM

## 2018-08-16 LAB
BACTERIA SPEC CULT: NO GROWTH
Lab: NORMAL
SPECIMEN SOURCE: NORMAL

## 2018-08-16 RX ORDER — METRONIDAZOLE 500 MG/1
500 TABLET ORAL 2 TIMES DAILY
Qty: 14 TABLET | Refills: 0 | Status: SHIPPED | OUTPATIENT
Start: 2018-08-16 | End: 2018-09-24

## 2018-09-11 ENCOUNTER — PRENATAL OFFICE VISIT (OUTPATIENT)
Dept: OBGYN | Facility: CLINIC | Age: 33
End: 2018-09-11
Payer: COMMERCIAL

## 2018-09-11 VITALS — SYSTOLIC BLOOD PRESSURE: 124 MMHG | BODY MASS INDEX: 30.29 KG/M2 | DIASTOLIC BLOOD PRESSURE: 66 MMHG | WEIGHT: 182 LBS

## 2018-09-11 DIAGNOSIS — Z23 NEED FOR TDAP VACCINATION: ICD-10-CM

## 2018-09-11 DIAGNOSIS — Z34.83 SUPERVISION OF NORMAL INTRAUTERINE PREGNANCY IN MULTIGRAVIDA IN THIRD TRIMESTER: Primary | ICD-10-CM

## 2018-09-11 LAB
ERYTHROCYTE [DISTWIDTH] IN BLOOD BY AUTOMATED COUNT: 12.7 % (ref 10–15)
GLUCOSE 1H P 50 G GLC PO SERPL-MCNC: 148 MG/DL (ref 60–129)
HCT VFR BLD AUTO: 33.6 % (ref 35–47)
HGB BLD-MCNC: 11.6 G/DL (ref 11.7–15.7)
MCH RBC QN AUTO: 30.7 PG (ref 26.5–33)
MCHC RBC AUTO-ENTMCNC: 34.5 G/DL (ref 31.5–36.5)
MCV RBC AUTO: 89 FL (ref 78–100)
PLATELET # BLD AUTO: 249 10E9/L (ref 150–450)
RBC # BLD AUTO: 3.78 10E12/L (ref 3.8–5.2)
WBC # BLD AUTO: 10.1 10E9/L (ref 4–11)

## 2018-09-11 PROCEDURE — 36415 COLL VENOUS BLD VENIPUNCTURE: CPT | Performed by: OBSTETRICS & GYNECOLOGY

## 2018-09-11 PROCEDURE — 90471 IMMUNIZATION ADMIN: CPT | Performed by: OBSTETRICS & GYNECOLOGY

## 2018-09-11 PROCEDURE — 90715 TDAP VACCINE 7 YRS/> IM: CPT | Performed by: OBSTETRICS & GYNECOLOGY

## 2018-09-11 PROCEDURE — 82950 GLUCOSE TEST: CPT | Performed by: OBSTETRICS & GYNECOLOGY

## 2018-09-11 PROCEDURE — 85027 COMPLETE CBC AUTOMATED: CPT | Performed by: OBSTETRICS & GYNECOLOGY

## 2018-09-11 PROCEDURE — 99207 ZZC PRENATAL VISIT: CPT | Performed by: OBSTETRICS & GYNECOLOGY

## 2018-09-11 NOTE — MR AVS SNAPSHOT
After Visit Summary   9/11/2018    Felipa Cyr    MRN: [id number]           Patient Information     Date Of Birth          1985        Visit Information        Provider Department      9/11/2018 3:00 PM Jodi Joel MD [facility]        Today's Diagnoses     Supervision of normal intrauterine pregnancy in multigravida in third trimester    -  1    Need for Tdap vaccination           Follow-ups after your visit        Follow-up notes from your care team     Return in about 2 weeks (around 9/25/2018).      Your next 10 appointments already scheduled     Sep 24, 2018  4:00 PM CDT   ESTABLISHED PRENATAL with Jodi Joel MD   [facility] ([facility])    [address] [address]   [phone]            Oct 08, 2018  3:30 PM CDT   US OB SINGLE FOLLOW UP REPEAT with WEUS1   [facility] ([facility])    [address] [facility] [address]  [facility] [address]   [phone]           How do I prepare for my exam? (Food and drink instructions) Drink four 8-ounce glasses of fluid an hour before your exam. If you need to empty your bladder before your exam, try to release only a little urine. Then, drink another glass of fluid.  How do I prepare for my exam? (Other instructions) You may have up to two family members in the exam room. If you bring a small child, an adult must be there to care for him or her. No video or camera photography during the procedure.  What should I wear: Wear comfortable clothes.  How long does the exam take: Most ultrasounds take 30 to 60 minutes.  What should I bring: Bring a list of your medicines, including vitamins, minerals and over-the-counter drugs. It is safest to leave personal items at home.  Do I need a :  No  is needed.  What do I need to tell my doctor: Tell your doctor about any allergies  you may have.  What should I do after the exam: No restrictions, You may resume normal activities.  What is this test: An ultrasound uses sound waves to make pictures of the body. Sound waves do not cause pain. The only discomfort may be the pressure of the wand against your skin or full bladder.  Who should I call with questions: If you have any questions, please call the Imaging Department where you will have your exam. Directions, parking instructions, and other information is available on our website, [email or web address].            Oct 08, 2018  4:00 PM CDT   ESTABLISHED PRENATAL with Jodi Joel MD   [facility] ([facility])    [address] [place] [id number] [phone]              Future tests that were ordered for you today     Open Future Orders        Priority Expected Expires Ordered    US OB Ltd One Or More Fetus FU/Repeat Routine  9/11/2019 9/11/2018            Who to contact     If you have questions or need follow up information about today's clinic visit or your schedule please contact [facility] [place] directly at [phone].  Normal or non-critical lab and imaging results will be communicated to you by MyChart, letter or phone within 4 business days after the clinic has received the results. If you do not hear from us within 7 days, please contact the clinic through MyChart or phone. If you have a critical or abnormal lab result, we will notify you by phone as soon as possible.  Submit refill requests through [employer] or call your pharmacy and they will forward the refill request to us. Please allow 3 business days for your refill to be completed.          Additional Information About Your Visit        Care EveryWhere ID     This is your Care EveryWhere ID. This could be used by other organizations to access your [place] medical records  [id number]        Your Vitals Were     Last Period BMI  (Body Mass Index)                02/24/2018 30.29 kg/m2           Blood Pressure from Last 3 Encounters:   09/11/18 124/66   08/14/18 100/68   07/17/18 100/68    Weight from Last 3 Encounters:   09/11/18 182 lb (82.6 kg)   08/14/18 178 lb 12.8 oz (81.1 kg)   07/17/18 177 lb (80.3 kg)              We Performed the Following     TDAP VACCINE (ADACEL)     VACCINE ADMINISTRATION, INITIAL        Primary Care Provider Office Phone # Fax #    Dimple Karina Arredondo [phone]       [facility] [address] [facility] [address] [address]        Equal Access to Services     BRO CHAN : Declan Mackay, tatiana lopez, qajillian kaalmashavonne lazo, cailin nye . So [facility] [phone].    ATENCIÓN: Si habla español, tiene a jones disposición servicios gratuitos de asistencia lingüística. Llame al [phone].    We comply with applicable federal civil rights laws and [place] laws. We do not discriminate on the basis of race, color, national origin, age, disability, sex, sexual orientation, or gender identity.            Thank you!     Thank you for choosing [facility] [place]  for your care. Our goal is always to provide you with excellent care. Hearing back from our patients is one way we can continue to improve our services. Please take a few minutes to complete the written survey that you may receive in the mail after your visit with us. Thank you!             Your Updated Medication List - Protect others around you: Learn how to safely use, store and throw away your medicines at www.disposemymeds.org.          This list is accurate as of 9/11/18  3:36 PM.  Always use your most recent med list.                   Brand Name Dispense Instructions for use Diagnosis    metroNIDAZOLE 500 MG tablet    FLAGYL    14 tablet    Take 1 tablet (500 mg) by mouth 2 times daily    Bacterial vaginosis       PRENATAL VITAMIN PO

## 2018-09-11 NOTE — PROGRESS NOTES
Prenatal Visit: doing well. Good fetal movement. Glucola and CBC today. Accepts TDAP. Discussed seeking Pediatric care and also discussed ordering breast pump via her insurance as well.  RTC in 2 weeks. Growth sonogram at 32 weeks.  Jodi Joel MD

## 2018-09-14 DIAGNOSIS — R73.09 IMPAIRED GLUCOSE TOLERANCE TEST: Primary | ICD-10-CM

## 2018-09-14 PROCEDURE — 82951 GLUCOSE TOLERANCE TEST (GTT): CPT | Performed by: OBSTETRICS & GYNECOLOGY

## 2018-09-14 PROCEDURE — 82952 GTT-ADDED SAMPLES: CPT | Performed by: OBSTETRICS & GYNECOLOGY

## 2018-09-14 PROCEDURE — 36415 COLL VENOUS BLD VENIPUNCTURE: CPT | Performed by: OBSTETRICS & GYNECOLOGY

## 2018-09-15 LAB
GLUCOSE 1H P 100 G GLC PO SERPL-MCNC: 130 MG/DL (ref 60–179)
GLUCOSE 2H P 100 G GLC PO SERPL-MCNC: 115 MG/DL (ref 60–154)
GLUCOSE 3H P 100 G GLC PO SERPL-MCNC: 110 MG/DL (ref 60–139)
GLUCOSE P FAST SERPL-MCNC: 61 MG/DL (ref 60–94)

## 2018-09-24 ENCOUNTER — PRENATAL OFFICE VISIT (OUTPATIENT)
Dept: OBGYN | Facility: CLINIC | Age: 33
End: 2018-09-24
Payer: COMMERCIAL

## 2018-09-24 VITALS — DIASTOLIC BLOOD PRESSURE: 70 MMHG | BODY MASS INDEX: 30.62 KG/M2 | WEIGHT: 184 LBS | SYSTOLIC BLOOD PRESSURE: 118 MMHG

## 2018-09-24 DIAGNOSIS — O34.219 PREVIOUS CESAREAN DELIVERY, ANTEPARTUM: Primary | ICD-10-CM

## 2018-09-24 DIAGNOSIS — Z23 NEED FOR PROPHYLACTIC VACCINATION AND INOCULATION AGAINST INFLUENZA: ICD-10-CM

## 2018-09-24 DIAGNOSIS — Z34.83 SUPERVISION OF NORMAL INTRAUTERINE PREGNANCY IN MULTIGRAVIDA IN THIRD TRIMESTER: ICD-10-CM

## 2018-09-24 PROCEDURE — 90471 IMMUNIZATION ADMIN: CPT | Performed by: OBSTETRICS & GYNECOLOGY

## 2018-09-24 PROCEDURE — 90686 IIV4 VACC NO PRSV 0.5 ML IM: CPT | Performed by: OBSTETRICS & GYNECOLOGY

## 2018-09-24 PROCEDURE — 99207 ZZC PRENATAL VISIT: CPT | Performed by: OBSTETRICS & GYNECOLOGY

## 2018-09-24 NOTE — MR AVS SNAPSHOT
After Visit Summary   2018    Felipa Cyr    MRN: [id number]           Patient Information     Date Of Birth          1985        Visit Information        Provider Department      2018 4:00 PM Jodi Joel MD [facility] [place]        Today's Diagnoses     Previous  delivery, antepartum    -  1    Supervision of normal intrauterine pregnancy in multigravida in third trimester        Need for prophylactic vaccination and inoculation against influenza           Follow-ups after your visit        Additional Services     PEDIATRICS REFERRAL        Your provider has referred you to: [facility]: Pediatrics - Multiple locations [phone] [email or web address]    Please be aware that coverage of these services is subject to the terms and limitations of your health insurance plan.  Call member services at your health plan with any benefit or coverage questions.      Please bring the following with you to your appointment:    (1) Any X-Rays, CTs or MRIs which have been performed.  Contact the facility where they were done to arrange for  prior to your scheduled appointment.    (2) List of current medications   (3) This referral request   (4) Any documents/labs given to you for this referral                  Follow-up notes from your care team     Return in about 2 weeks (around 10/8/2018).      Your next 10 appointments already scheduled     Oct 08, 2018  3:30 PM CDT   US OB SINGLE FOLLOW UP REPEAT with WEUS1   [facility] [place] ([facility] [place])    [address] [place] [address]   [phone]           How do I prepare for my exam? (Food and drink instructions) Drink four 8-ounce glasses of fluid an hour before your exam. If you need to empty your bladder before your exam, try to release only a little urine. Then, drink another glass of fluid.  How do I prepare for  my exam? (Other instructions) You may have up to two family members in the exam room. If you bring a small child, an adult must be there to care for him or her. No video or camera photography during the procedure.  What should I wear: Wear comfortable clothes.  How long does the exam take: Most ultrasounds take 30 to 60 minutes.  What should I bring: Bring a list of your medicines, including vitamins, minerals and over-the-counter drugs. It is safest to leave personal items at home.  Do I need a :  No  is needed.  What do I need to tell my doctor: Tell your doctor about any allergies you may have.  What should I do after the exam: No restrictions, You may resume normal activities.  What is this test: An ultrasound uses sound waves to make pictures of the body. Sound waves do not cause pain. The only discomfort may be the pressure of the wand against your skin or full bladder.  Who should I call with questions: If you have any questions, please call the Imaging Department where you will have your exam. Directions, parking instructions, and other information is available on our website, [email or web address].            Oct 08, 2018  4:00 PM CDT   ESTABLISHED PRENATAL with Jodi Joel MD   [facility] [place] ([facility] [place])    [address] [place] [id number] [phone]            Oct 22, 2018  3:50 PM CDT   ESTABLISHED PRENATAL with Jodi Joel MD   [facility] [place] ([facility] [place])    [address] [place] [id number] [phone]              Who to contact     If you have questions or need follow up information about today's clinic visit or your schedule please contact [facility] [place] directly at [phone].  Normal or non-critical lab and imaging results will be communicated to you by MyChart, letter or phone within 4 business days after  the clinic has received the results. If you do not hear from us within 7 days, please contact the clinic through [employer] or phone. If you have a critical or abnormal lab result, we will notify you by phone as soon as possible.  Submit refill requests through [employer] or call your pharmacy and they will forward the refill request to us. Please allow 3 business days for your refill to be completed.          Additional Information About Your Visit        Care EveryWhere ID     This is your Care EveryWhere ID. This could be used by other organizations to access your [place] medical records  [id number]        Your Vitals Were     Last Period BMI (Body Mass Index)                02/24/2018 30.62 kg/m2           Blood Pressure from Last 3 Encounters:   09/24/18 118/70   09/11/18 124/66   08/14/18 100/68    Weight from Last 3 Encounters:   09/24/18 184 lb (83.5 kg)   09/11/18 182 lb (82.6 kg)   08/14/18 178 lb 12.8 oz (81.1 kg)              We Performed the Following     FLU VACCINE, SPLIT VIRUS, IM (QUADRIVALENT) [12267]- >3 YRS     PEDIATRICS REFERRAL      Vaccine Administration, Initial [33029]        Primary Care Provider Office Phone # Fax #    Dimple Arredondo [phone]       [facility] [address] [address]        Equal Access to Services     [facility] DUSTIN : Hadii aad ku hadasho Soross, waaxda luqadaha, qaybta kaalmada violet, cailin spaulding. So [facility] [phone].    ATENCIÓN: Si habla español, tiene a jones disposición servicios gratuitos de asistencia lingüística. Brad al [phone].    We comply with applicable federal civil rights laws and [place] laws. We do not discriminate on the basis of race, color, national origin, age, disability, sex, sexual orientation, or gender identity.            Thank you!     Thank you for choosing [facility] [place]  for your care. Our goal is always to provide you with  excellent care. Hearing back from our patients is one way we can continue to improve our services. Please take a few minutes to complete the written survey that you may receive in the mail after your visit with us. Thank you!             Your Updated Medication List - Protect others around you: Learn how to safely use, store and throw away your medicines at www.disposemymeds.org.          This list is accurate as of 9/24/18  4:28 PM.  Always use your most recent med list.                   Brand Name Dispense Instructions for use Diagnosis    PRENATAL VITAMIN PO

## 2018-09-24 NOTE — PROGRESS NOTES

## 2018-09-24 NOTE — PROGRESS NOTES
Doing well. Good fetal movement. Plan for growth ultrasound at the next visit. Plans to use  [facility]. Called but has not been called back for an appointment.

## 2018-10-08 ENCOUNTER — PRENATAL OFFICE VISIT (OUTPATIENT)
Dept: OBGYN | Facility: CLINIC | Age: 33
End: 2018-10-08
Payer: COMMERCIAL

## 2018-10-08 ENCOUNTER — RADIANT APPOINTMENT (OUTPATIENT)
Dept: ULTRASOUND IMAGING | Facility: CLINIC | Age: 33
End: 2018-10-08
Payer: COMMERCIAL

## 2018-10-08 VITALS — WEIGHT: 187 LBS | DIASTOLIC BLOOD PRESSURE: 76 MMHG | SYSTOLIC BLOOD PRESSURE: 124 MMHG | BODY MASS INDEX: 31.12 KG/M2

## 2018-10-08 DIAGNOSIS — O34.219 PREVIOUS CESAREAN DELIVERY, ANTEPARTUM: Primary | ICD-10-CM

## 2018-10-08 DIAGNOSIS — O34.219 DESIRES VBAC (VAGINAL BIRTH AFTER CESAREAN) TRIAL: ICD-10-CM

## 2018-10-08 DIAGNOSIS — Z34.83 SUPERVISION OF NORMAL INTRAUTERINE PREGNANCY IN MULTIGRAVIDA IN THIRD TRIMESTER: ICD-10-CM

## 2018-10-08 PROCEDURE — 76816 OB US FOLLOW-UP PER FETUS: CPT | Performed by: OBSTETRICS & GYNECOLOGY

## 2018-10-08 PROCEDURE — 99207 ZZC PRENATAL VISIT: CPT | Performed by: OBSTETRICS & GYNECOLOGY

## 2018-10-08 NOTE — PROGRESS NOTES
Prenatal Visit: the father of the baby and Felipa have decided to get a divorce. Many questions were asked about childcare post delivery. I recommended couples counseling or mediation with their attorneys to minimize conflict after childbirth.  Felipa was seen by herself and she feels safe and is living with her mother.   Otherwise Felipa is having poor sleep due to fetal waking and moving at night. She has good fetal movement no contractions. Overall fetal growth is wnl today. Normal HARDEEP. She still desires a TOLAC. Will plan to recheck growth at 36 weeks.  RTC in 2 weeks.  Jodi Joel MD

## 2018-10-08 NOTE — MR AVS SNAPSHOT
After Visit Summary   10/8/2018    Felipa Cyr    MRN: [id number]           Patient Information     Date Of Birth          1985        Visit Information        Provider Department      10/8/2018 4:00 PM Jodi Joel MD [facility] [place]        Today's Diagnoses     Supervision of normal intrauterine pregnancy in multigravida in third trimester           Follow-ups after your visit        Your next 10 appointments already scheduled     Oct 23, 2018  4:00 PM CDT   ESTABLISHED PRENATAL with Jodi Joel MD   [facility] [place] ([facility])    [address] [facility] [address]  [facility] [id number] [phone]              Who to contact     If you have questions or need follow up information about today's clinic visit or your schedule please contact [facility] [place] directly at [phone].  Normal or non-critical lab and imaging results will be communicated to you by MyChart, letter or phone within 4 business days after the clinic has received the results. If you do not hear from us within 7 days, please contact the clinic through MyChart or phone. If you have a critical or abnormal lab result, we will notify you by phone as soon as possible.  Submit refill requests through [employer] or call your pharmacy and they will forward the refill request to us. Please allow 3 business days for your refill to be completed.          Additional Information About Your Visit        Care EveryWhere ID     This is your Care EveryWhere ID. This could be used by other organizations to access your [place] medical records  [id number]        Your Vitals Were     Last Period Breastfeeding? BMI (Body Mass Index)             02/24/2018 No 31.12 kg/m2          Blood Pressure from Last 3 Encounters:   10/08/18 124/76   09/24/18 118/70   09/11/18 124/66    Weight from Last 3 Encounters:   10/08/18 187 lb (84.8 kg)   09/24/18  184 lb (83.5 kg)   09/11/18 182 lb (82.6 kg)              Today, you had the following     No orders found for display       Primary Care Provider Office Phone # Fax #    Dimple Arredondo [phone]       [facility] [address] [address]        Equal Access to Services     [facility]: Hadii aad ku hadasho Soomaali, waaxda luqadaha, qaybta kaalmada adeegyada, waxay idiin hayaan adeeg kharash la'aan . So [facility] [phone].    ATENCIÓN: Si habla español, tiene a jones disposición servicios gratuitos de asistencia lingüística. Cinthyaame al [phone].    We comply with applicable federal civil rights laws and [place] laws. We do not discriminate on the basis of race, color, national origin, age, disability, sex, sexual orientation, or gender identity.            Thank you!     Thank you for choosing [facility] [place]  for your care. Our goal is always to provide you with excellent care. Hearing back from our patients is one way we can continue to improve our services. Please take a few minutes to complete the written survey that you may receive in the mail after your visit with us. Thank you!             Your Updated Medication List - Protect others around you: Learn how to safely use, store and throw away your medicines at www.disposemymeds.org.          This list is accurate as of 10/8/18  4:31 PM.  Always use your most recent med list.                   Brand Name Dispense Instructions for use Diagnosis    PRENATAL VITAMIN PO

## 2018-10-12 ENCOUNTER — HOSPITAL ENCOUNTER (OUTPATIENT)
Facility: CLINIC | Age: 33
Discharge: HOME | End: 2018-10-12
Attending: OBSTETRICS & GYNECOLOGY | Admitting: OBSTETRICS & GYNECOLOGY
Payer: COMMERCIAL

## 2018-10-12 ENCOUNTER — HOSPITAL ENCOUNTER (EMERGENCY)
Facility: CLINIC | Age: 33
End: 2018-10-12
Payer: COMMERCIAL

## 2018-10-12 VITALS — DIASTOLIC BLOOD PRESSURE: 62 MMHG | TEMPERATURE: 97.9 F | SYSTOLIC BLOOD PRESSURE: 115 MMHG

## 2018-10-12 PROCEDURE — 59025 FETAL NON-STRESS TEST: CPT

## 2018-10-12 PROCEDURE — G0463 HOSPITAL OUTPT CLINIC VISIT: HCPCS | Mod: 25

## 2018-10-12 RX ORDER — ONDANSETRON 2 MG/ML
4 INJECTION INTRAMUSCULAR; INTRAVENOUS EVERY 6 HOURS PRN
Status: DISCONTINUED | OUTPATIENT
Start: 2018-10-12 | End: 2018-10-12 | Stop reason: HOSPADM

## 2018-10-12 NOTE — IP AVS SNAPSHOT
MRN:[id number]                      After Visit Summary   10/12/2018    Felipa Cyr    MRN: [id number]           Thank you!     Thank you for choosing [place] for your care. Our goal is always to provide you with excellent care. Hearing back from our patients is one way we can continue to improve our services. Please take a few minutes to complete the written survey that you may receive in the mail after you visit with us. Thank you!        Patient Information     Date Of Birth          1985        About your hospital stay     You were admitted on:  October 12, 2018 You last received care in the:  [facility]    You were discharged on:  October 12, 2018       Who to Call     For medical emergencies, please call 911.  For non-urgent questions about your medical care, please call your primary care provider or clinic, [phone]          Attending Provider     Provider Sugar Curtis MD OB/Gyn    [place], Jodi Flores MD OB/Gyn       Primary Care Provider Office Phone # Fax #    Dimple Karina Arredondo [phone]      Your next 10 appointments already scheduled     Oct 23, 2018  4:00 PM CDT   ESTABLISHED PRENATAL with Jodi Joel MD   [facility] [place] ([facility] [place])    [address] [place] [id number] [phone]              Further instructions from your care team       Discharge Instruction for Undelivered Patients      You were seen for: Labor Assessment and Fetal Assessment  We Consulted: Dr Tadeo    Diet:   Drink 8 to 12 glasses of liquids (milk, juice, water) every day.  You may eat meals and snacks.     Activity:  Count fetal kicks everyday (see handout)  Call your doctor or nurse midwife if your baby is moving less than usual.     Call your provider if you notice:  Swelling in your face or increased swelling in your hands or legs.  Headaches that are  not relieved by Tylenol (acetaminophen).  Changes in your vision (blurring: seeing spots or stars.)  Nausea (sick to your stomach) and vomiting (throwing up).   Weight gain of 5 pounds or more per week.  Heartburn that doesn't go away.  Signs of bladder infection: pain when you urinate (use the toilet), need to go more often and more urgently.  The bag of garza (rupture of membranes) breaks, or you notice leaking in your underwear.  Bright red blood in your underwear.  Abdominal (lower belly) or stomach pain.  Second (plus) baby: Contractions (tightening) less than 10 minutes apart and getting stronger.  *If less than 34 weeks: Contractions (tightenings) more than 6 times in one hour.  Increase or change in vaginal discharge (note the color and amount)    Follow-up:  As scheduled in the clinic          Pending Results     No orders found from 10/10/2018 to 10/13/2018.            Admission Information     Date & Time Provider Department Dept. Phone    10/12/2018 Jodi Joel MD [facility] [phone]      Your Vitals Were     Last Period                   02/24/2018           Care EveryWhere ID     This is your Care EveryWhere ID. This could be used by other organizations to access your [place] medical records  [id number]        Equal Access to Services     BRO CHAN AH: Declan hamilton Soross, waaxda luqadaha, qaybta kaalmada adeegyada, cailin spaulding. So [facility] [phone].    ATENCIÓN: Si habla español, tiene a jones disposición servicios gratuitos de asistencia lingüística. Llame al [phone].    We comply with applicable federal civil rights laws and [place] laws. We do not discriminate on the basis of race, color, national origin, age, disability, sex, sexual orientation, or gender identity.               Review of your medicines      UNREVIEWED medicines. Ask your doctor about these medicines        Dose / Directions    PRENATAL VITAMIN PO         Refills:  0                Protect others around you: Learn how to safely use, store and throw away your medicines at www.disposemymeds.org.             Medication List: This is a list of all your medications and when to take them. Check marks below indicate your daily home schedule. Keep this list as a reference.      Medications           Morning Afternoon Evening Bedtime As Needed    PRENATAL VITAMIN PO

## 2018-10-12 NOTE — IP AVS SNAPSHOT
[address], [address]    [place] [address]    Phone:  [phone]                                       After Visit Summary   10/12/2018    Felipa Cyr    MRN: [id number]           After Visit Summary Signature Page     I have received my discharge instructions, and my questions have been answered. I have discussed any challenges I see with this plan with the nurse or doctor.    ..........................................................................................................................................  Patient/Patient Representative Signature      ..........................................................................................................................................  Patient Representative Print Name and Relationship to Patient    ..................................................               ................................................  Date                                   Time    ..........................................................................................................................................  Reviewed by Signature/Title    ...................................................              ..............................................  Date                                               Time          22EPIC Rev 08/18

## 2018-10-13 NOTE — DISCHARGE INSTRUCTIONS
Discharge Instruction for Undelivered Patients      You were seen for: Labor Assessment and Fetal Assessment  We Consulted: Dr Tadeo    Diet:   Drink 8 to 12 glasses of liquids (milk, juice, water) every day.  You may eat meals and snacks.     Activity:  Count fetal kicks everyday (see handout)  Call your doctor or nurse midwife if your baby is moving less than usual.     Call your provider if you notice:  Swelling in your face or increased swelling in your hands or legs.  Headaches that are not relieved by Tylenol (acetaminophen).  Changes in your vision (blurring: seeing spots or stars.)  Nausea (sick to your stomach) and vomiting (throwing up).   Weight gain of 5 pounds or more per week.  Heartburn that doesn't go away.  Signs of bladder infection: pain when you urinate (use the toilet), need to go more often and more urgently.  The bag of garza (rupture of membranes) breaks, or you notice leaking in your underwear.  Bright red blood in your underwear.  Abdominal (lower belly) or stomach pain.  Second (plus) baby: Contractions (tightening) less than 10 minutes apart and getting stronger.  *If less than 34 weeks: Contractions (tightenings) more than 6 times in one hour.  Increase or change in vaginal discharge (note the color and amount)    Follow-up:  As scheduled in the clinic

## 2018-10-13 NOTE — PLAN OF CARE
Pt to MAC for eval of uterine tightening. Pt states that she had a stressful day and has felt some tightening. TOCO and EFM applied. Baby very active. Pt given marker to identify contractions.  Pt monitored for 1 hour. Had 3 contractions, felt 2. Dr Tadeo notified. Order to discharge. Instructions reviewed, understanding verbalized.

## 2018-10-23 ENCOUNTER — PRENATAL OFFICE VISIT (OUTPATIENT)
Dept: OBGYN | Facility: CLINIC | Age: 33
End: 2018-10-23
Payer: COMMERCIAL

## 2018-10-23 VITALS — WEIGHT: 185 LBS | DIASTOLIC BLOOD PRESSURE: 70 MMHG | BODY MASS INDEX: 30.79 KG/M2 | SYSTOLIC BLOOD PRESSURE: 110 MMHG

## 2018-10-23 DIAGNOSIS — Z34.83 SUPERVISION OF NORMAL INTRAUTERINE PREGNANCY IN MULTIGRAVIDA IN THIRD TRIMESTER: Primary | ICD-10-CM

## 2018-10-23 PROCEDURE — 99207 ZZC PRENATAL VISIT: CPT | Performed by: OBSTETRICS & GYNECOLOGY

## 2018-10-23 NOTE — PROGRESS NOTES
Prenatal Visit: doing well. Good fetal movement. TC and her keep on going back and forth about divorce versus not. She is safe at her mother's place for the moment. She denies contractions.   RTC in 2 weeks. GBS at the next visit.  Jodi Joel MD

## 2018-10-23 NOTE — MR AVS SNAPSHOT
After Visit Summary   10/23/2018    Felipa Cyr    MRN: [id number]           Patient Information     Date Of Birth          1985        Visit Information        Provider Department      10/23/2018 4:00 PM Jodi Joel MD [facility] [place]        Today's Diagnoses     Supervision of normal intrauterine pregnancy in multigravida in third trimester    -  1       Follow-ups after your visit        Follow-up notes from your care team     Return in about 2 weeks (around 11/6/2018).      Who to contact     If you have questions or need follow up information about today's clinic visit or your schedule please contact [facility] directly at [phone].  Normal or non-critical lab and imaging results will be communicated to you by MyChart, letter or phone within 4 business days after the clinic has received the results. If you do not hear from us within 7 days, please contact the clinic through MyChart or phone. If you have a critical or abnormal lab result, we will notify you by phone as soon as possible.  Submit refill requests through [employer] or call your pharmacy and they will forward the refill request to us. Please allow 3 business days for your refill to be completed.          Additional Information About Your Visit        Care EveryWhere ID     This is your Care EveryWhere ID. This could be used by other organizations to access your [place] medical records  [id number]        Your Vitals Were     Last Period BMI (Body Mass Index)                02/24/2018 30.79 kg/m2           Blood Pressure from Last 3 Encounters:   10/23/18 110/70   10/12/18 115/62   10/08/18 124/76    Weight from Last 3 Encounters:   10/23/18 185 lb (83.9 kg)   10/08/18 187 lb (84.8 kg)   09/24/18 184 lb (83.5 kg)              Today, you had the following     No orders found for display       Primary Care Provider Office Phone # Fax #    Dimple Arredondo MD  [phone]       [facility] [address] [address]        Equal Access to Services     BRO CHAN : Declan Mackay, tatiana lopez, jasonjillian lazaroemeliashavonne cardenastiagoshavonne, cailin mckee roaraceli cardenasmicky isauracliffflavia spaulding. So [facility] [phone].    ATENCIÓN: Si habla español, tiene a jones disposición servicios gratuitos de asistencia lingüística. Llame al [phone].    We comply with applicable federal civil rights laws and [place] laws. We do not discriminate on the basis of race, color, national origin, age, disability, sex, sexual orientation, or gender identity.            Thank you!     Thank you for choosing [facility] [place]  for your care. Our goal is always to provide you with excellent care. Hearing back from our patients is one way we can continue to improve our services. Please take a few minutes to complete the written survey that you may receive in the mail after your visit with us. Thank you!             Your Updated Medication List - Protect others around you: Learn how to safely use, store and throw away your medicines at www.disposemymeds.org.          This list is accurate as of 10/23/18  4:17 PM.  Always use your most recent med list.                   Brand Name Dispense Instructions for use Diagnosis    PRENATAL VITAMIN PO

## 2018-11-06 ENCOUNTER — PRENATAL OFFICE VISIT (OUTPATIENT)
Dept: OBGYN | Facility: CLINIC | Age: 33
End: 2018-11-06
Payer: COMMERCIAL

## 2018-11-06 VITALS — WEIGHT: 188.6 LBS | DIASTOLIC BLOOD PRESSURE: 62 MMHG | SYSTOLIC BLOOD PRESSURE: 110 MMHG | BODY MASS INDEX: 31.38 KG/M2

## 2018-11-06 DIAGNOSIS — R82.71 BACTERIURIA DURING PREGNANCY: ICD-10-CM

## 2018-11-06 DIAGNOSIS — O99.891 BACTERIURIA DURING PREGNANCY: ICD-10-CM

## 2018-11-06 DIAGNOSIS — O99.340 DEPRESSION AFFECTING PREGNANCY, ANTEPARTUM: ICD-10-CM

## 2018-11-06 DIAGNOSIS — Z34.83 SUPERVISION OF NORMAL INTRAUTERINE PREGNANCY IN MULTIGRAVIDA IN THIRD TRIMESTER: Primary | ICD-10-CM

## 2018-11-06 DIAGNOSIS — F32.A DEPRESSION AFFECTING PREGNANCY, ANTEPARTUM: ICD-10-CM

## 2018-11-06 DIAGNOSIS — R82.90 NONSPECIFIC FINDING ON EXAMINATION OF URINE: ICD-10-CM

## 2018-11-06 DIAGNOSIS — Z36.85 SCREENING, ANTENATAL, FOR STREPTOCOCCUS B: ICD-10-CM

## 2018-11-06 PROCEDURE — 99207 ZZC PRENATAL VISIT: CPT | Performed by: OBSTETRICS & GYNECOLOGY

## 2018-11-06 PROCEDURE — 87086 URINE CULTURE/COLONY COUNT: CPT | Performed by: OBSTETRICS & GYNECOLOGY

## 2018-11-06 PROCEDURE — 87653 STREP B DNA AMP PROBE: CPT | Performed by: OBSTETRICS & GYNECOLOGY

## 2018-11-06 RX ORDER — CEPHALEXIN 500 MG/1
500 CAPSULE ORAL 3 TIMES DAILY
Qty: 21 CAPSULE | Refills: 0 | Status: SHIPPED | OUTPATIENT
Start: 2018-11-06 | End: 2019-03-26

## 2018-11-06 NOTE — PROGRESS NOTES
Prenatal Visit: doing well for the most part. Here with her sister Morena. Would like to be in therapy due to stress from impending divorce. Nitrite positive urine today. No symptoms however. Will start cephalexin and follow up on the urine culture. Labor precautions given.  RTC in 1 week  Jodi Joel MD

## 2018-11-06 NOTE — MR AVS SNAPSHOT
After Visit Summary   2018    Felipa Cyr    MRN: [id number]           Patient Information     Date Of Birth          1985        Visit Information        Provider Department      2018 4:00 PM Jodi Joel MD [facility]        Today's Diagnoses     Screening, , for Streptococcus B    -  1    Supervision of normal intrauterine pregnancy in multigravida in third trimester        Nonspecific finding on examination of urine        Bacteriuria during pregnancy        Depression affecting pregnancy, antepartum           Follow-ups after your visit        Additional Services     MENTAL HEALTH REFERRAL  - Adult; Outpatient Treatment; Individual/Couples/Family/Group Therapy/Health Psychology; FMG: [facility] [phone]; We will contact you to schedule the appointment or please call with any questions       All scheduling is subject to the client's specific insurance plan & benefits, provider/location availability, and provider clinical specialities.  Please arrive 15 minutes early for your first appointment and bring your completed paperwork.    Please be aware that coverage of these services is subject to the terms and limitations of your health insurance plan.  Call member services at your health plan with any benefit or coverage questions.                            Your next 10 appointments already scheduled     2018  4:00 PM CST   ESTABLISHED PRENATAL with Jodi Joel MD   [facility] ([facility])    [address] [place] [address]   [phone]            2018  4:00 PM CST   ESTABLISHED PRENATAL with Jodi Joel MD   [facility] ([facility])    [address] [place] [address]   [phone]            2018  4:00 PM CST   ESTABLISHED  PRENATAL with Jodi Joel MD   [facility] [place] ([facility] [place])    [address]  [place] [address] [phone]            Dec 04, 2018  3:50 PM CST   ESTABLISHED PRENATAL with Jodi Joel MD   [facility] ([facility] [place])    [address]  [place] [address] [phone]              Future tests that were ordered for you today     Open Future Orders        Priority Expected Expires Ordered    US OB >14 Weeks Follow Up Routine  11/6/2019 11/6/2018            Who to contact     If you have questions or need follow up information about today's clinic visit or your schedule please contact [facility] [place] directly at [phone].  Normal or non-critical lab and imaging results will be communicated to you by MyChart, letter or phone within 4 business days after the clinic has received the results. If you do not hear from us within 7 days, please contact the clinic through MyChart or phone. If you have a critical or abnormal lab result, we will notify you by phone as soon as possible.  Submit refill requests through [employer] or call your pharmacy and they will forward the refill request to us. Please allow 3 business days for your refill to be completed.          Additional Information About Your Visit        Care EveryWhere ID     This is your Care EveryWhere ID. This could be used by other organizations to access your [place] medical records  [id number]        Your Vitals Were     Last Period BMI (Body Mass Index)                02/24/2018 31.38 kg/m2           Blood Pressure from Last 3 Encounters:   11/06/18 110/62   10/23/18 110/70   10/12/18 115/62    Weight from Last 3 Encounters:   11/06/18 188 lb 9.6 oz (85.5 kg)   10/23/18 185 lb (83.9 kg)   10/08/18 187 lb (84.8 kg)              We Performed the Following     Group B strep PCR     MENTAL  HEALTH REFERRAL  - Adult; Outpatient Treatment; Individual/Couples/Family/Group Therapy/Health Psychology; G: [facility] [phone]; We will contact you to schedule the appointment or please call with any questions     Urine Culture Aerobic Bacterial          Today's Medication Changes          These changes are accurate as of 11/6/18  4:38 PM.  If you have any questions, ask your nurse or doctor.               Start taking these medicines.        Dose/Directions    cephALEXin 500 MG capsule   Commonly known as:  KEFLEX   Used for:  Bacteriuria during pregnancy   Started by:  Jodi Joel MD        Dose:  500 mg   Take 1 capsule (500 mg) by mouth 3 times daily for 7 days   Quantity:  21 capsule   Refills:  0            Where to get your medicines      These medications were sent to [id number] - [place], [place] - [address] AT [address] [place] [address] & [facility]  [address] [place] [address]    Hours:  24-hours Phone:  [phone]     cephALEXin 500 MG capsule                Primary Care Provider Office Phone # Fax #    Dimple Arredondo [phone]       [facility] [address] [facility] [address]  [place] [address]        Equal Access to Services     BRO CHAN AH: Hadii augie grimeso Soross, waaxda luqadaha, qaybta kaalmada adeegyada, cailin spaulding. So [facility] [phone].    ATENCIÓN: Si habla español, tiene a jones disposición servicios gratuitos de asistencia lingüística. Llame al [phone].    We comply with applicable federal civil rights laws and [place] laws. We do not discriminate on the basis of race, color, national origin, age, disability, sex, sexual orientation, or gender identity.            Thank you!     Thank you for choosing [facility]  for your care. Our goal is always to provide you with excellent care. Hearing back from our patients is one way we can continue to  improve our services. Please take a few minutes to complete the written survey that you may receive in the mail after your visit with us. Thank you!             Your Updated Medication List - Protect others around you: Learn how to safely use, store and throw away your medicines at www.disposemymeds.org.          This list is accurate as of 11/6/18  4:38 PM.  Always use your most recent med list.                   Brand Name Dispense Instructions for use Diagnosis    cephALEXin 500 MG capsule    KEFLEX    21 capsule    Take 1 capsule (500 mg) by mouth 3 times daily for 7 days    Bacteriuria during pregnancy       PRENATAL VITAMIN PO

## 2018-11-06 NOTE — LETTER
November 6, 2018      Felipa Cyr  [address]  [facility] [place] [facility] [address]        To Whom It May Concern:    Felipa Cyr  was seen on 11/6/2018 for prenatal care. I am well versed in her social situation at home. She is currently under a lot of emotional distress and due to this is unable to function in a job at this time.        Sincerely,        Jodi Joel MD

## 2018-11-08 LAB
BACTERIA SPEC CULT: NORMAL
GP B STREP DNA SPEC QL NAA+PROBE: NEGATIVE
Lab: NORMAL
SPECIMEN SOURCE: NORMAL
SPECIMEN SOURCE: NORMAL

## 2018-11-13 ENCOUNTER — PRENATAL OFFICE VISIT (OUTPATIENT)
Dept: OBGYN | Facility: CLINIC | Age: 33
End: 2018-11-13
Payer: COMMERCIAL

## 2018-11-13 VITALS — SYSTOLIC BLOOD PRESSURE: 97 MMHG | DIASTOLIC BLOOD PRESSURE: 60 MMHG | BODY MASS INDEX: 31.45 KG/M2 | WEIGHT: 189 LBS

## 2018-11-13 DIAGNOSIS — Z86.19: Primary | ICD-10-CM

## 2018-11-13 DIAGNOSIS — Z34.83 SUPERVISION OF NORMAL INTRAUTERINE PREGNANCY IN MULTIGRAVIDA IN THIRD TRIMESTER: ICD-10-CM

## 2018-11-13 PROCEDURE — 99207 ZZC PRENATAL VISIT: CPT | Performed by: OBSTETRICS & GYNECOLOGY

## 2018-11-13 RX ORDER — VALACYCLOVIR HYDROCHLORIDE 1 G/1
1000 TABLET, FILM COATED ORAL DAILY
Qty: 30 TABLET | Refills: 1 | Status: SHIPPED | OUTPATIENT
Start: 2018-11-13 | End: 2019-01-14

## 2018-11-13 NOTE — PROGRESS NOTES
Prenatal visit: good fetal movement. Discussed that if there is no labor and no cervical dilation by 41 weeks we will have a repeat  section. Labor precautions reviewed. She declined SVE today. Rx for valtrex sent to her pharmacy.  RTC in 1 week.  Jodi Joel MD

## 2018-11-18 ENCOUNTER — HOSPITAL ENCOUNTER (OUTPATIENT)
Facility: CLINIC | Age: 33
Discharge: HOME | End: 2018-11-18
Attending: OBSTETRICS & GYNECOLOGY | Admitting: OBSTETRICS & GYNECOLOGY
Payer: COMMERCIAL

## 2018-11-18 VITALS — RESPIRATION RATE: 16 BRPM | TEMPERATURE: 98.6 F | DIASTOLIC BLOOD PRESSURE: 67 MMHG | SYSTOLIC BLOOD PRESSURE: 120 MMHG

## 2018-11-18 PROCEDURE — G0463 HOSPITAL OUTPT CLINIC VISIT: HCPCS | Mod: 25

## 2018-11-18 PROCEDURE — 59025 FETAL NON-STRESS TEST: CPT

## 2018-11-18 RX ORDER — ONDANSETRON 2 MG/ML
4 INJECTION INTRAMUSCULAR; INTRAVENOUS EVERY 6 HOURS PRN
Status: DISCONTINUED | OUTPATIENT
Start: 2018-11-18 | End: 2018-11-18 | Stop reason: HOSPADM

## 2018-11-18 NOTE — IP AVS SNAPSHOT
MRN:[id number]                      After Visit Summary   11/18/2018    Felipa Bermudez    MRN: [id number]           Thank you!     Thank you for choosing [facility] for your care. Our goal is always to provide you with excellent care. Hearing back from our patients is one way we can continue to improve our services. Please take a few minutes to complete the written survey that you may receive in the mail after you visit with us. Thank you!        Patient Information     Date Of Birth          1985        About your hospital stay     You were admitted on:  November 18, 2018 You last received care in the:  [facility]    You were discharged on:  November 18, 2018       Who to Call     For medical emergencies, please call 911.  For non-urgent questions about your medical care, please call your primary care provider or clinic, [phone]          Attending Provider     Provider Specialty    Masters, Sabra Qureshi DO OB/Gyn       Primary Care Provider Office Phone # Fax #    Dimple Arredondo [phone]      Your next 10 appointments already scheduled     Nov 19, 2018  4:00 PM CST   ESTABLISHED PRENATAL with Jodi Joel MD   [facility] [place] ([facility])    [address] [place] [address] [address]  [facility] [address]   [phone]            Nov 27, 2018  4:00 PM CST   ESTABLISHED PRENATAL with Jodi Joel MD   [facility] [place] ([facility] [place])    [address] [place] [address] [address]  [facility] [address]   [phone]            Dec 04, 2018  3:50 PM CST   ESTABLISHED PRENATAL with Jodi Joel MD   [facility] [place] ([facility] [place])    [address] [facility] [address]  [facility] [address]   [phone]              Further instructions from your care team       Discharge Instruction for Undelivered Patients       You were seen for: Labor Assessment, Bleeding Assessment and Fetal Assessment  We Consulted: Dr. Lawson  You had (Test or Medicine): Cervical exam, non stress test     Diet:   Drink 8 to 12 glasses of liquids (milk, juice, water) every day.  You may eat meals and snacks.     Activity:  Call your doctor or nurse midwife if your baby is moving less than usual.     Call your provider if you notice:  Swelling in your face or increased swelling in your hands or legs.  Headaches that are not relieved by Tylenol (acetaminophen).  Changes in your vision (blurring: seeing spots or stars.)  Nausea (sick to your stomach) and vomiting (throwing up).   Weight gain of 5 pounds or more per week.  Heartburn that doesn't go away.  Signs of bladder infection: pain when you urinate (use the toilet), need to go more often and more urgently.  The bag of garza (rupture of membranes) breaks, or you notice leaking in your underwear.  Bright red blood in your underwear.  Abdominal (lower belly) or stomach pain.  Second (plus) baby: Contractions (tightening) less than 10 minutes apart and getting stronger.    Follow-up:  As scheduled in the clinic          Pending Results     No orders found from 11/16/2018 to 11/19/2018.            Admission Information     Date & Time Provider Department Dept. Phone    11/18/2018 Sabra Lawson,  [facility] [phone]      Your Vitals Were     Blood Pressure Temperature Respirations Last Period          120/67 98.6  F (37  C) (Temporal) 16 02/24/2018        Care EveryWhere ID     This is your Care EveryWhere ID. This could be used by other organizations to access your [place] medical records  [id number]        Equal Access to Services     [facility] : Declan Mackay, tatiana lopez, cailin reis. So [facility] [phone].    ATENCIÓN: Si habla español, tiene a jones disposición servicios gratuitos de  emelina lingüísticsamaria. Brad sims [phone].    We comply with applicable federal civil rights laws and [place] laws. We do not discriminate on the basis of race, color, national origin, age, disability, sex, sexual orientation, or gender identity.               Review of your medicines      UNREVIEWED medicines. Ask your doctor about these medicines        Dose / Directions    CEPHALEXIN PO        Refills:  0       PRENATAL VITAMIN PO        Refills:  0       valACYclovir 1000 mg tablet   Commonly known as:  VALTREX   Used for:  History of NIKA positive for herpes simplex virus        Dose:  1000 mg   Take 1 tablet (1,000 mg) by mouth daily   Quantity:  30 tablet   Refills:  1                Protect others around you: Learn how to safely use, store and throw away your medicines at www.disposemymeds.org.        ANTIBIOTIC INSTRUCTION     You've Been Prescribed an Antibiotic - Now What?  Your healthcare team thinks that you or your loved one might have an infection. Some infections can be treated with antibiotics, which are powerful, life-saving drugs. Like all medications, antibiotics have side effects and should only be used when necessary. There are some important things you should know about your antibiotic treatment.      Your healthcare team may run tests before you start taking an antibiotic.    Your team may take samples (e.g., from your blood, urine or other areas) to run tests to look for bacteria. These test can be important to determine if you need an antibiotic at all and, if you do, which antibiotic will work best.      Within a few days, your healthcare team might change or even stop your antibiotic.    Your team may start you on an antibiotic while they are working to find out what is making you sick.    Your team might change your antibiotic because test results show that a different antibiotic would be better to treat your infection.    In some cases, once your team has more information, they  learn that you do not need an antibiotic at all. They may find out that you don't have an infection, or that the antibiotic you're taking won't work against your infection. For example, an infection caused by a virus can't be treated with antibiotics. Staying on an antibiotic when you don't need it is more likely to be harmful than helpful.      You may experience side effects from your antibiotic.    Like all medications, antibiotics have side effects. Some of these can be serious.    Let you healthcare team know if you have any known allergies when you are admitted to the hospital.    One significant side effect of nearly all antibiotics is the risk of severe and sometimes deadly diarrhea caused by Clostridium difficile (C. Difficile). This occurs when a person takes antibiotics because some good germs are destroyed. Antibiotic use allows C. diificile to take over, putting patients at high risk for this serious infection.    As a patient or caregiver, it is important to understand your or your loved one's antibiotic treatment. It is especially important for caregivers to speak up when patients can't speak for themselves. Here are some important questions to ask your healthcare team.    What infection is this antibiotic treating and how do you know I have that infection?    What side effects might occur from this antibiotic?    How long will I need to take this antibiotic?    Is it safe to take this antibiotic with other medications or supplements (e.g., vitamins) that I am taking?     Are there any special directions I need to know about taking this antibiotic? For example, should I take it with food?    How will I be monitored to know whether my infection is responding to the antibiotic?    What tests may help to make sure the right antibiotic is prescribed for me?      Information provided by:  www.cdc.gov/getsmart  U.S. Department of Health and Human Services  Centers for disease Control and  Prevention  National Center for Emerging and Zoonotic Infectious Diseases  Division of Healthcare Quality Promotion             Medication List: This is a list of all your medications and when to take them. Check marks below indicate your daily home schedule. Keep this list as a reference.      Medications           Morning Afternoon Evening Bedtime As Needed    CEPHALEXIN PO                                PRENATAL VITAMIN PO                                valACYclovir 1000 mg tablet   Commonly known as:  VALTREX   Take 1 tablet (1,000 mg) by mouth daily

## 2018-11-18 NOTE — IP AVS SNAPSHOT
[address], [address]    [facility] [address]    Phone:  [phone]                                       After Visit Summary   11/18/2018    Felipa Bermudez    MRN: [id number]           After Visit Summary Signature Page     I have received my discharge instructions, and my questions have been answered. I have discussed any challenges I see with this plan with the nurse or doctor.    ..........................................................................................................................................  Patient/Patient Representative Signature      ..........................................................................................................................................  Patient Representative Print Name and Relationship to Patient    ..................................................               ................................................  Date                                   Time    ..........................................................................................................................................  Reviewed by Signature/Title    ...................................................              ..............................................  Date                                               Time          22EPIC Rev 08/18

## 2018-11-19 ENCOUNTER — ANESTHESIA (OUTPATIENT)
Dept: OBGYN | Facility: CLINIC | Age: 33
End: 2018-11-19
Payer: COMMERCIAL

## 2018-11-19 ENCOUNTER — TELEPHONE (OUTPATIENT)
Dept: OBGYN | Facility: CLINIC | Age: 33
End: 2018-11-19

## 2018-11-19 ENCOUNTER — ANESTHESIA EVENT (OUTPATIENT)
Dept: OBGYN | Facility: CLINIC | Age: 33
End: 2018-11-19
Payer: COMMERCIAL

## 2018-11-19 ENCOUNTER — HOSPITAL ENCOUNTER (INPATIENT)
Facility: CLINIC | Age: 33
LOS: 3 days | Discharge: HOME | End: 2018-11-22
Attending: OBSTETRICS & GYNECOLOGY | Admitting: OBSTETRICS & GYNECOLOGY
Payer: COMMERCIAL

## 2018-11-19 DIAGNOSIS — D62 ANEMIA DUE TO BLOOD LOSS, ACUTE: ICD-10-CM

## 2018-11-19 PROBLEM — R58 BLEEDING: Status: ACTIVE | Noted: 2018-11-19

## 2018-11-19 PROBLEM — O36.8390 NON-REASSURING ELECTRONIC FETAL MONITORING TRACING: Status: ACTIVE | Noted: 2018-11-19

## 2018-11-19 LAB
ABO + RH BLD: NORMAL
ABO + RH BLD: NORMAL
BASOPHILS # BLD AUTO: 0 10E9/L (ref 0–0.2)
BASOPHILS NFR BLD AUTO: 0.1 %
BLD GP AB SCN SERPL QL: NORMAL
BLOOD BANK CMNT PATIENT-IMP: NORMAL
DIFFERENTIAL METHOD BLD: NORMAL
EOSINOPHIL # BLD AUTO: 0.1 10E9/L (ref 0–0.7)
EOSINOPHIL NFR BLD AUTO: 1.3 %
ERYTHROCYTE [DISTWIDTH] IN BLOOD BY AUTOMATED COUNT: 13.3 % (ref 10–15)
HCT VFR BLD AUTO: 35.3 % (ref 35–47)
HGB BLD-MCNC: 12.2 G/DL (ref 11.7–15.7)
IMM GRANULOCYTES # BLD: 0 10E9/L (ref 0–0.4)
IMM GRANULOCYTES NFR BLD: 0.2 %
LYMPHOCYTES # BLD AUTO: 2 10E9/L (ref 0.8–5.3)
LYMPHOCYTES NFR BLD AUTO: 21.1 %
MCH RBC QN AUTO: 30.6 PG (ref 26.5–33)
MCHC RBC AUTO-ENTMCNC: 34.6 G/DL (ref 31.5–36.5)
MCV RBC AUTO: 89 FL (ref 78–100)
MONOCYTES # BLD AUTO: 0.8 10E9/L (ref 0–1.3)
MONOCYTES NFR BLD AUTO: 8.4 %
NEUTROPHILS # BLD AUTO: 6.5 10E9/L (ref 1.6–8.3)
NEUTROPHILS NFR BLD AUTO: 68.9 %
NRBC # BLD AUTO: 0 10*3/UL
NRBC BLD AUTO-RTO: 0 /100
PLATELET # BLD AUTO: 210 10E9/L (ref 150–450)
RBC # BLD AUTO: 3.99 10E12/L (ref 3.8–5.2)
SPECIMEN EXP DATE BLD: NORMAL
WBC # BLD AUTO: 9.4 10E9/L (ref 4–11)

## 2018-11-19 PROCEDURE — 85025 COMPLETE CBC W/AUTO DIFF WBC: CPT | Performed by: OBSTETRICS & GYNECOLOGY

## 2018-11-19 PROCEDURE — 27110038 ZZH RX 271: Performed by: ANESTHESIOLOGY

## 2018-11-19 PROCEDURE — 86780 TREPONEMA PALLIDUM: CPT | Performed by: OBSTETRICS & GYNECOLOGY

## 2018-11-19 PROCEDURE — 25000128 H RX IP 250 OP 636: Performed by: ANESTHESIOLOGY

## 2018-11-19 PROCEDURE — 25000125 ZZHC RX 250: Performed by: NURSE ANESTHETIST, CERTIFIED REGISTERED

## 2018-11-19 PROCEDURE — 25000125 ZZHC RX 250: Performed by: OBSTETRICS & GYNECOLOGY

## 2018-11-19 PROCEDURE — 27210794 ZZH OR GENERAL SUPPLY STERILE: Performed by: OBSTETRICS & GYNECOLOGY

## 2018-11-19 PROCEDURE — 71000015 ZZH RECOVERY PHASE 1 LEVEL 2 EA ADDTL HR: Performed by: OBSTETRICS & GYNECOLOGY

## 2018-11-19 PROCEDURE — 36000058 ZZH SURGERY LEVEL 3 EA 15 ADDTL MIN: Performed by: OBSTETRICS & GYNECOLOGY

## 2018-11-19 PROCEDURE — 37000008 ZZH ANESTHESIA TECHNICAL FEE, 1ST 30 MIN: Performed by: OBSTETRICS & GYNECOLOGY

## 2018-11-19 PROCEDURE — 12000031 ZZH R&B OB CRITICAL

## 2018-11-19 PROCEDURE — 25000128 H RX IP 250 OP 636: Performed by: OBSTETRICS & GYNECOLOGY

## 2018-11-19 PROCEDURE — 86901 BLOOD TYPING SEROLOGIC RH(D): CPT | Performed by: OBSTETRICS & GYNECOLOGY

## 2018-11-19 PROCEDURE — 86850 RBC ANTIBODY SCREEN: CPT | Performed by: OBSTETRICS & GYNECOLOGY

## 2018-11-19 PROCEDURE — 59510 CESAREAN DELIVERY: CPT | Performed by: OBSTETRICS & GYNECOLOGY

## 2018-11-19 PROCEDURE — 71000014 ZZH RECOVERY PHASE 1 LEVEL 2 FIRST HR: Performed by: OBSTETRICS & GYNECOLOGY

## 2018-11-19 PROCEDURE — 36000056 ZZH SURGERY LEVEL 3 1ST 30 MIN: Performed by: OBSTETRICS & GYNECOLOGY

## 2018-11-19 PROCEDURE — 37000009 ZZH ANESTHESIA TECHNICAL FEE, EACH ADDTL 15 MIN: Performed by: OBSTETRICS & GYNECOLOGY

## 2018-11-19 PROCEDURE — 25000132 ZZH RX MED GY IP 250 OP 250 PS 637: Performed by: OBSTETRICS & GYNECOLOGY

## 2018-11-19 PROCEDURE — 25000125 ZZHC RX 250: Performed by: ANESTHESIOLOGY

## 2018-11-19 PROCEDURE — 86900 BLOOD TYPING SEROLOGIC ABO: CPT | Performed by: OBSTETRICS & GYNECOLOGY

## 2018-11-19 PROCEDURE — 25000128 H RX IP 250 OP 636: Performed by: NURSE ANESTHETIST, CERTIFIED REGISTERED

## 2018-11-19 PROCEDURE — 36415 COLL VENOUS BLD VENIPUNCTURE: CPT | Performed by: OBSTETRICS & GYNECOLOGY

## 2018-11-19 PROCEDURE — 3E0R3BZ INTRODUCTION OF ANESTHETIC AGENT INTO SPINAL CANAL, PERCUTANEOUS APPROACH: ICD-10-PCS | Performed by: ANESTHESIOLOGY

## 2018-11-19 PROCEDURE — 00HU33Z INSERTION OF INFUSION DEVICE INTO SPINAL CANAL, PERCUTANEOUS APPROACH: ICD-10-PCS | Performed by: ANESTHESIOLOGY

## 2018-11-19 PROCEDURE — 10907ZC DRAINAGE OF AMNIOTIC FLUID, THERAPEUTIC FROM PRODUCTS OF CONCEPTION, VIA NATURAL OR ARTIFICIAL OPENING: ICD-10-PCS | Performed by: OBSTETRICS & GYNECOLOGY

## 2018-11-19 RX ORDER — OXYTOCIN/0.9 % SODIUM CHLORIDE 30/500 ML
PLASTIC BAG, INJECTION (ML) INTRAVENOUS PRN
Status: DISCONTINUED | OUTPATIENT
Start: 2018-11-19 | End: 2018-11-20

## 2018-11-19 RX ORDER — CARBOPROST TROMETHAMINE 250 UG/ML
250 INJECTION, SOLUTION INTRAMUSCULAR
Status: DISCONTINUED | OUTPATIENT
Start: 2018-11-19 | End: 2018-11-20

## 2018-11-19 RX ORDER — MORPHINE SULFATE 1 MG/ML
INJECTION, SOLUTION EPIDURAL; INTRATHECAL; INTRAVENOUS
Status: DISCONTINUED
Start: 2018-11-19 | End: 2018-11-19 | Stop reason: HOSPADM

## 2018-11-19 RX ORDER — ONDANSETRON 2 MG/ML
INJECTION INTRAMUSCULAR; INTRAVENOUS PRN
Status: DISCONTINUED | OUTPATIENT
Start: 2018-11-19 | End: 2018-11-20

## 2018-11-19 RX ORDER — NALOXONE HYDROCHLORIDE 0.4 MG/ML
.1-.4 INJECTION, SOLUTION INTRAMUSCULAR; INTRAVENOUS; SUBCUTANEOUS
Status: DISCONTINUED | OUTPATIENT
Start: 2018-11-19 | End: 2018-11-20

## 2018-11-19 RX ORDER — CITRIC ACID/SODIUM CITRATE 334-500MG
30 SOLUTION, ORAL ORAL ONCE
Status: DISCONTINUED | OUTPATIENT
Start: 2018-11-19 | End: 2018-11-20

## 2018-11-19 RX ORDER — OXYTOCIN/0.9 % SODIUM CHLORIDE 30/500 ML
1-24 PLASTIC BAG, INJECTION (ML) INTRAVENOUS CONTINUOUS
Status: DISCONTINUED | OUTPATIENT
Start: 2018-11-19 | End: 2018-11-20

## 2018-11-19 RX ORDER — CITRIC ACID/SODIUM CITRATE 334-500MG
30 SOLUTION, ORAL ORAL
Status: COMPLETED | OUTPATIENT
Start: 2018-11-19 | End: 2018-11-19

## 2018-11-19 RX ORDER — ONDANSETRON 2 MG/ML
4 INJECTION INTRAMUSCULAR; INTRAVENOUS EVERY 6 HOURS PRN
Status: DISCONTINUED | OUTPATIENT
Start: 2018-11-19 | End: 2018-11-20

## 2018-11-19 RX ORDER — SODIUM CHLORIDE, SODIUM LACTATE, POTASSIUM CHLORIDE, CALCIUM CHLORIDE 600; 310; 30; 20 MG/100ML; MG/100ML; MG/100ML; MG/100ML
INJECTION, SOLUTION INTRAVENOUS CONTINUOUS
Status: DISCONTINUED | OUTPATIENT
Start: 2018-11-19 | End: 2018-11-20

## 2018-11-19 RX ORDER — LIDOCAINE 40 MG/G
CREAM TOPICAL
Status: DISCONTINUED | OUTPATIENT
Start: 2018-11-19 | End: 2018-11-20

## 2018-11-19 RX ORDER — TERBUTALINE SULFATE 1 MG/ML
0.25 INJECTION, SOLUTION SUBCUTANEOUS
Status: DISCONTINUED | OUTPATIENT
Start: 2018-11-19 | End: 2018-11-20

## 2018-11-19 RX ORDER — ACETAMINOPHEN 325 MG/1
650 TABLET ORAL EVERY 4 HOURS PRN
Status: DISCONTINUED | OUTPATIENT
Start: 2018-11-19 | End: 2018-11-20

## 2018-11-19 RX ORDER — NALBUPHINE HYDROCHLORIDE 10 MG/ML
2.5-5 INJECTION, SOLUTION INTRAMUSCULAR; INTRAVENOUS; SUBCUTANEOUS EVERY 6 HOURS PRN
Status: DISCONTINUED | OUTPATIENT
Start: 2018-11-19 | End: 2018-11-22 | Stop reason: HOSPADM

## 2018-11-19 RX ORDER — ONDANSETRON 2 MG/ML
INJECTION INTRAMUSCULAR; INTRAVENOUS
Status: DISCONTINUED
Start: 2018-11-19 | End: 2018-11-19 | Stop reason: HOSPADM

## 2018-11-19 RX ORDER — FENTANYL CITRATE 50 UG/ML
50-100 INJECTION, SOLUTION INTRAMUSCULAR; INTRAVENOUS
Status: DISCONTINUED | OUTPATIENT
Start: 2018-11-19 | End: 2018-11-20

## 2018-11-19 RX ORDER — LIDOCAINE HCL/EPINEPHRINE/PF 2%-1:200K
VIAL (ML) INJECTION
Status: DISCONTINUED
Start: 2018-11-19 | End: 2018-11-19 | Stop reason: HOSPADM

## 2018-11-19 RX ORDER — CEFAZOLIN SODIUM 2 G/100ML
2 INJECTION, SOLUTION INTRAVENOUS
Status: COMPLETED | OUTPATIENT
Start: 2018-11-19 | End: 2018-11-19

## 2018-11-19 RX ORDER — IBUPROFEN 400 MG/1
800 TABLET, FILM COATED ORAL
Status: DISCONTINUED | OUTPATIENT
Start: 2018-11-19 | End: 2018-11-20

## 2018-11-19 RX ORDER — EPHEDRINE SULFATE 50 MG/ML
5 INJECTION, SOLUTION INTRAMUSCULAR; INTRAVENOUS; SUBCUTANEOUS
Status: DISCONTINUED | OUTPATIENT
Start: 2018-11-19 | End: 2018-11-20

## 2018-11-19 RX ORDER — CEFAZOLIN SODIUM 1 G/3ML
1 INJECTION, POWDER, FOR SOLUTION INTRAMUSCULAR; INTRAVENOUS SEE ADMIN INSTRUCTIONS
Status: DISCONTINUED | OUTPATIENT
Start: 2018-11-19 | End: 2018-11-20

## 2018-11-19 RX ORDER — OXYTOCIN/0.9 % SODIUM CHLORIDE 30/500 ML
PLASTIC BAG, INJECTION (ML) INTRAVENOUS
Status: DISCONTINUED
Start: 2018-11-19 | End: 2018-11-19 | Stop reason: HOSPADM

## 2018-11-19 RX ORDER — OXYTOCIN 10 [USP'U]/ML
10 INJECTION, SOLUTION INTRAMUSCULAR; INTRAVENOUS
Status: DISCONTINUED | OUTPATIENT
Start: 2018-11-19 | End: 2018-11-20

## 2018-11-19 RX ORDER — OXYTOCIN/0.9 % SODIUM CHLORIDE 30/500 ML
100-340 PLASTIC BAG, INJECTION (ML) INTRAVENOUS CONTINUOUS PRN
Status: DISCONTINUED | OUTPATIENT
Start: 2018-11-19 | End: 2018-11-20

## 2018-11-19 RX ORDER — ONDANSETRON 4 MG/1
4 TABLET, ORALLY DISINTEGRATING ORAL EVERY 6 HOURS PRN
Status: DISCONTINUED | OUTPATIENT
Start: 2018-11-19 | End: 2018-11-20

## 2018-11-19 RX ORDER — LIDOCAINE HCL/EPINEPHRINE/PF 2%-1:200K
VIAL (ML) INJECTION PRN
Status: DISCONTINUED | OUTPATIENT
Start: 2018-11-19 | End: 2018-11-20

## 2018-11-19 RX ORDER — FENTANYL CITRATE 50 UG/ML
100 INJECTION, SOLUTION INTRAMUSCULAR; INTRAVENOUS ONCE
Status: COMPLETED | OUTPATIENT
Start: 2018-11-19 | End: 2018-11-19

## 2018-11-19 RX ORDER — MORPHINE SULFATE 1 MG/ML
150 INJECTION, SOLUTION EPIDURAL; INTRATHECAL; INTRAVENOUS ONCE
Status: DISCONTINUED | OUTPATIENT
Start: 2018-11-19 | End: 2018-11-22 | Stop reason: HOSPADM

## 2018-11-19 RX ORDER — OXYCODONE AND ACETAMINOPHEN 5; 325 MG/1; MG/1
1 TABLET ORAL
Status: DISCONTINUED | OUTPATIENT
Start: 2018-11-19 | End: 2018-11-22 | Stop reason: HOSPADM

## 2018-11-19 RX ORDER — METHYLERGONOVINE MALEATE 0.2 MG/ML
200 INJECTION INTRAVENOUS
Status: DISCONTINUED | OUTPATIENT
Start: 2018-11-19 | End: 2018-11-20

## 2018-11-19 RX ORDER — ROPIVACAINE HYDROCHLORIDE 2 MG/ML
10 INJECTION, SOLUTION EPIDURAL; INFILTRATION; PERINEURAL ONCE
Status: COMPLETED | OUTPATIENT
Start: 2018-11-19 | End: 2018-11-19

## 2018-11-19 RX ORDER — ACETAMINOPHEN 325 MG/1
975 TABLET ORAL ONCE
Status: DISCONTINUED | OUTPATIENT
Start: 2018-11-19 | End: 2018-11-20

## 2018-11-19 RX ADMIN — PHENYLEPHRINE HYDROCHLORIDE 50 MCG: 10 INJECTION, SOLUTION INTRAMUSCULAR; INTRAVENOUS; SUBCUTANEOUS at 23:22

## 2018-11-19 RX ADMIN — PHENYLEPHRINE HYDROCHLORIDE 100 MCG: 10 INJECTION, SOLUTION INTRAMUSCULAR; INTRAVENOUS; SUBCUTANEOUS at 23:40

## 2018-11-19 RX ADMIN — PHENYLEPHRINE HYDROCHLORIDE 50 MCG: 10 INJECTION, SOLUTION INTRAMUSCULAR; INTRAVENOUS; SUBCUTANEOUS at 23:26

## 2018-11-19 RX ADMIN — ROPIVACAINE HYDROCHLORIDE 8 ML: 2 INJECTION, SOLUTION EPIDURAL; INFILTRATION at 19:35

## 2018-11-19 RX ADMIN — SODIUM CITRATE AND CITRIC ACID MONOHYDRATE 30 ML: 500; 334 SOLUTION ORAL at 22:50

## 2018-11-19 RX ADMIN — PHENYLEPHRINE HYDROCHLORIDE 100 MCG: 10 INJECTION, SOLUTION INTRAMUSCULAR; INTRAVENOUS; SUBCUTANEOUS at 23:16

## 2018-11-19 RX ADMIN — AZITHROMYCIN MONOHYDRATE 500 MG: 500 INJECTION, POWDER, LYOPHILIZED, FOR SOLUTION INTRAVENOUS at 23:27

## 2018-11-19 RX ADMIN — SODIUM CHLORIDE, POTASSIUM CHLORIDE, SODIUM LACTATE AND CALCIUM CHLORIDE: 600; 310; 30; 20 INJECTION, SOLUTION INTRAVENOUS at 23:09

## 2018-11-19 RX ADMIN — CEFAZOLIN SODIUM 2 G: 2 INJECTION, SOLUTION INTRAVENOUS at 23:15

## 2018-11-19 RX ADMIN — LIDOCAINE HYDROCHLORIDE,EPINEPHRINE BITARTRATE 13 ML: 20; .005 INJECTION, SOLUTION EPIDURAL; INFILTRATION; INTRACAUDAL; PERINEURAL at 23:00

## 2018-11-19 RX ADMIN — PHENYLEPHRINE HYDROCHLORIDE 50 MCG: 10 INJECTION, SOLUTION INTRAMUSCULAR; INTRAVENOUS; SUBCUTANEOUS at 23:54

## 2018-11-19 RX ADMIN — Medication 12 ML/HR: at 19:45

## 2018-11-19 RX ADMIN — PHENYLEPHRINE HYDROCHLORIDE 100 MCG: 10 INJECTION, SOLUTION INTRAMUSCULAR; INTRAVENOUS; SUBCUTANEOUS at 23:15

## 2018-11-19 RX ADMIN — PHENYLEPHRINE HYDROCHLORIDE 50 MCG: 10 INJECTION, SOLUTION INTRAMUSCULAR; INTRAVENOUS; SUBCUTANEOUS at 23:46

## 2018-11-19 RX ADMIN — PHENYLEPHRINE HYDROCHLORIDE 50 MCG: 10 INJECTION, SOLUTION INTRAMUSCULAR; INTRAVENOUS; SUBCUTANEOUS at 23:38

## 2018-11-19 RX ADMIN — MORPHINE SULFATE 3 MG: 10 INJECTION, SOLUTION INTRAMUSCULAR; INTRAVENOUS at 23:15

## 2018-11-19 RX ADMIN — OXYTOCIN-SODIUM CHLORIDE 0.9% IV SOLN 30 UNIT/500ML 2 MILLI-UNITS/MIN: 30-0.9/5 SOLUTION at 17:50

## 2018-11-19 RX ADMIN — FENTANYL CITRATE 100 MCG: 50 INJECTION, SOLUTION INTRAMUSCULAR; INTRAVENOUS at 19:33

## 2018-11-19 RX ADMIN — Medication 5 MG: at 19:52

## 2018-11-19 RX ADMIN — PHENYLEPHRINE HYDROCHLORIDE 50 MCG: 10 INJECTION, SOLUTION INTRAMUSCULAR; INTRAVENOUS; SUBCUTANEOUS at 23:49

## 2018-11-19 RX ADMIN — Medication 340 ML: at 23:32

## 2018-11-19 RX ADMIN — ONDANSETRON 4 MG: 2 INJECTION INTRAMUSCULAR; INTRAVENOUS at 23:13

## 2018-11-19 RX ADMIN — SODIUM CHLORIDE, POTASSIUM CHLORIDE, SODIUM LACTATE AND CALCIUM CHLORIDE: 600; 310; 30; 20 INJECTION, SOLUTION INTRAVENOUS at 19:35

## 2018-11-19 RX ADMIN — SODIUM CHLORIDE, POTASSIUM CHLORIDE, SODIUM LACTATE AND CALCIUM CHLORIDE 500 ML: 600; 310; 30; 20 INJECTION, SOLUTION INTRAVENOUS at 19:00

## 2018-11-19 NOTE — PROGRESS NOTES
2030: Pt presents to [facility] for bleeding assessment. Consent given to place external monitors, obtain vital signs, and obtain medical history. Pt states she had a nickel sized clot in the toilet around 1930, followed by a small amount of bright red bleeding when she wiped. Pt notes a scant amount of pink bleeding when she wipes since. Pt also states she is feeling occasional lower abdominal cramping, rating it a 7/10 pain. Pt currently being treated for a UTI, however she states UTI symptoms are better. Pt denies any leaking of fluid and reports positive fetal movement.  2040: Occasional ctx noted on monitor. Consent for SVE. Fingertip/-4 station.   2043: Dr. Lawson on phone for further orders. Telephone order for NST, and to discharge pt home if reactive NST.  2110: Reactive NST. Occasional ctx. Pt encouraged to return to [facility] if more vaginal bleeding noted and/or an increase in ctx, leaking of fluid, or decreased fetal movement. [place] discouraged for the next 24 hours. Pt agrees with plan of care and is discharged home, with an appointment in the clinic tomorrow.

## 2018-11-19 NOTE — TELEPHONE ENCOUNTER
If greater than 35 weeks contractions every 4-5 minutes lasting for one minute, for more than one hour: yes   Send to L&D, call and let charge RN know pt is coming.    If less than 35 weeks and contractions more often than 6/hour: no   Determine from pt's chart if the pt has a history of  delivery prior to 36 weeks: no    If yes, notify MD    If contractions for less than one hour, mild, no bleeding: no   Oral hydration, empty bladder, lie down for one hour. If contractions continue more than 6/hr, call back and refer to L&D. Notify L&D.    If patient has had contractions more than 6/hr despite the above, or if contractions are strong/getting stronger, associated with other symptoms such as bleeding, etc: yes   Notify on call MD immediatly    Pt advised to go into the mac unit- While on the phone her contractions were about 4 minutes apart. Unable t talk during contractions.  Mac unit notified.

## 2018-11-19 NOTE — DISCHARGE INSTRUCTIONS
Discharge Instruction for Undelivered Patients      You were seen for: Labor Assessment, Bleeding Assessment and Fetal Assessment  We Consulted: Dr. Lawson  You had (Test or Medicine): Cervical exam, non stress test     Diet:   Drink 8 to 12 glasses of liquids (milk, juice, water) every day.  You may eat meals and snacks.     Activity:  Call your doctor or nurse midwife if your baby is moving less than usual.     Call your provider if you notice:  Swelling in your face or increased swelling in your hands or legs.  Headaches that are not relieved by Tylenol (acetaminophen).  Changes in your vision (blurring: seeing spots or stars.)  Nausea (sick to your stomach) and vomiting (throwing up).   Weight gain of 5 pounds or more per week.  Heartburn that doesn't go away.  Signs of bladder infection: pain when you urinate (use the toilet), need to go more often and more urgently.  The bag of garza (rupture of membranes) breaks, or you notice leaking in your underwear.  Bright red blood in your underwear.  Abdominal (lower belly) or stomach pain.  Second (plus) baby: Contractions (tightening) less than 10 minutes apart and getting stronger.    Follow-up:  As scheduled in the clinic

## 2018-11-19 NOTE — PLAN OF CARE
Discharge instructions of  discussed with pt and spouse. Spouse and pt having hightened disagreement. Pt requested security to be called and  be removed from the room. Security responded, spoke with pt, pt's mom and .

## 2018-11-19 NOTE — IP AVS SNAPSHOT
MRN:[id number]                      After Visit Summary   2018    Felipa Bermudez    MRN: [id number]           Thank you!     Thank you for choosing [facility] for your care. Our goal is always to provide you with excellent care. Hearing back from our patients is one way we can continue to improve our services. Please take a few minutes to complete the written survey that you may receive in the mail after you visit with us. Thank you!        Patient Information     Date Of Birth          1985        Designated Caregiver       Most Recent Value    Caregiver    Will someone help with your care after discharge? yes    Name of designated caregiver jesenia-pts mother      About your hospital stay     You were admitted on:  2018 You last received care in the:  [address]    You were discharged on:  2018       Who to Call     For medical emergencies, please call 911.  For non-urgent questions about your medical care, please call your primary care provider or clinic, [phone]  For questions related to your surgery, please call your surgery clinic        Attending Provider     Provider Sugar Curtis MD OB/Gyn    [facility]       Primary Care Provider Office Phone # Fax #    Lansingrigoberto Arredondo [phone]      Further instructions from your care team       Postop  Birth Instructions    Activity       Do not lift more than 10 pounds for 6 weeks after surgery.  Ask family and friends for help when you need it.    No driving until you have stopped taking your pain medications (usually two weeks after surgery).    No heavy exercise or activity for 6 weeks.  Don't do anything that will put a strain on your surgery site.    Don't strain when using the toilet.  Your care team may prescribe a stool softener if you have problems with your bowel movements.     To care for your  incision:       Keep the incision clean and dry.    Do not soak your incision in water. No swimming or hot tubs until it has fully healed. You may soak in the bathtub if the water level is below your incision.    Do not use peroxide, gel, cream, lotion, or ointment on your incision.    Adjust your clothes to avoid pressure on your surgery site (check the elastic in your underwear for example).     You may see a small amount of clear or pink drainage and this is normal.  Check with your health care provider:       If the drainage increases or has an odor.    If the incision reddens, you have swelling, or develop a rash.    If you have increased pain and the medicine we prescribed doesn't help.    If you have a fever above 100.4 F (38 C) with or without chills when placing thermometer under your tongue.   The area around your incision (surgery wound), will feel numb.  This is normal. The numbness should go away in less than a year.     Keep your hands clean:  Always wash your hands before touching your incision (surgery wound). This helps reduce your risk of infection. If your hands aren't dirty, you may use an alcohol hand-rub to clean your hands. Keep your nails clean and short.    Call your healthcare provider if you have any of these symptoms:       You soak a sanitary pad with blood within 1 hour, or you see blood clots larger than a golf ball.    Bleeding that lasts more than 6 weeks.    Vaginal discharge that smells bad.    Severe pain, cramping or tenderness in your lower belly area.    A need to urinate more frequently (use the toilet more often), more urgently (use the toilet very quickly), or it burns when you urinate.    Nausea and vomiting.    Redness, swelling or pain around a vein in your leg.    Problems breastfeeding or a red or painful area on your breast.    Chest pain and cough or are gasping for air.    Problems with coping with sadness, anxiety or depression. If you have concerns about hurting  yourself or the baby, call your provider immediately.      You have questions or concerns after you return home.                DISCHARGE INSTRUCTIONS    Medications:  -May take Ibuprofen (800mg by mouth every 8 hours as needed for pain) or tylenol (500mg by mouth every 6 hours as needed for pain; max 4000mg per day) when at home as needed for pain/cramps/headaches/pain, follow instructions on bottle.  -You may use miralax (daily) or docusate (one tab by mouth twice daily) for stool softening, these are over the counter and can be found at any pharmacy. Follow bottle instructions.  -Continue prenatal vitamins.     Activity:  -Pelvic rest (no sex, tampons) for 6 weeks.   -General activity as tolerated, slowing increase daily. Avoid vigorous exercise, jumping or strength training for at least 4-6 weeks. Limit lifting to 10-15lb or less. You may drive when you are able to safely operate a motor vehicle and are no longer taking narcotic medications if they have been prescribed for you.    Precuations:  -Call doctor if heavy unexpected bleeding, fever of 100.4 or greater, foul vaginal discharge, severe abdominal pain not helped with medications or for any other concerns or questions. If you are having headaches not resolved by tylenol or ibuprofen, new or different vision changes then notify your doctor.    Follow Up Visit:  -Call the [facility] to schedule your 2 week early PP visit and then a 6 week postpartum appointment, [phone].             Pending Results     No orders found from 11/17/2018 to 11/20/2018.            Statement of Approval     Ordered          11/22/18 7020  I have reviewed and agree with all the recommendations and orders detailed in this document.  EFFECTIVE NOW     Approved and electronically signed by:  Sabra Lawson DO             Admission Information     Date & Time Provider Department Dept. Phone    11/19/2018 Jodi Joel MD [facility]  [facility] [phone]      Your Vitals Were     Blood Pressure Pulse Temperature Respirations Last Period Pulse Oximetry    109/65 70 97.4  F (36.3  C) (Oral) 16 02/24/2018 100%      Care EveryWhere ID     This is your Care EveryWhere ID. This could be used by other organizations to access your [facility] medical records  [id number]        Equal Access to Services     BRO CHAN : Hadii augie montes hadasho Soalexali, waaxda luqadaha, qaybta kaalmada ademickyyada, cailin delarosacliffflavia spaulding. So [facility] [phone].    ATENCIÓN: Si habla español, tiene a jones disposición servicios gratuitos de asistencia lingüística. Brad al [phone].    We comply with applicable federal civil rights laws and [place] laws. We do not discriminate on the basis of race, color, national origin, age, disability, sex, sexual orientation, or gender identity.               Review of your medicines      START taking        Dose / Directions    ferrous sulfate 325 (65 Fe) MG tablet   Commonly known as:  IRON   Used for:  Anemia due to blood loss, acute        Dose:  325 mg   Take 1 tablet (325 mg) by mouth 2 times daily   Quantity:  30 tablet   Refills:  0       ibuprofen 600 MG tablet   Commonly known as:  ADVIL/MOTRIN        Dose:  600 mg   Take 1 tablet (600 mg) by mouth every 6 hours as needed for other (cramping)   Quantity:  90 tablet   Refills:  1       oxyCODONE 5 MG tablet   Commonly known as:  ROXICODONE        Dose:  5-10 mg   Take 1-2 tablets (5-10 mg) by mouth every 3 hours as needed for moderate to severe pain   Quantity:  20 tablet   Refills:  0         CONTINUE these medicines which have NOT CHANGED        Dose / Directions    PRENATAL VITAMIN PO        Refills:  0       valACYclovir 1000 mg tablet   Commonly known as:  VALTREX   Used for:  History of NIKA positive for herpes simplex virus        Dose:  1000 mg   Take 1 tablet (1,000 mg) by mouth daily   Quantity:  30 tablet   Refills:  1         STOP  taking     CEPHALEXIN PO                Where to get your medicines      Some of these will need a paper prescription and others can be bought over the counter. Ask your nurse if you have questions.     Bring a paper prescription for each of these medications     ferrous sulfate 325 (65 Fe) MG tablet    ibuprofen 600 MG tablet    oxyCODONE 5 MG tablet                Protect others around you: Learn how to safely use, store and throw away your medicines at www.disposemymeds.org.        Information about OPIOIDS     PRESCRIPTION OPIOIDS: WHAT YOU NEED TO KNOW   We gave you an opioid (narcotic) pain medicine. It is important to manage your pain, but opioids are not always the best choice. You should first try all the other options your care team gave you. Take this medicine for as short a time (and as few doses) as possible.    Some activities can increase your pain, such as bandage changes or therapy sessions. It may help to take your pain medicine 30 to 60 minutes before these activities. Reduce your stress by getting enough sleep, working on hobbies you enjoy and practicing relaxation or meditation. Talk to your care team about ways to manage your pain beyond prescription opioids.    These medicines have risks:    DO NOT drive when on new or higher doses of pain medicine. These medicines can affect your alertness and reaction times, and you could be arrested for driving under the influence (DUI). If you need to use opioids long-term, talk to your care team about driving.    DO NOT operate heavy machinery    DO NOT do any other dangerous activities while taking these medicines.    DO NOT drink any alcohol while taking these medicines.     If the opioid prescribed includes acetaminophen, DO NOT take with any other medicines that contain acetaminophen. Read all labels carefully. Look for the word  acetaminophen  or  Tylenol.  Ask your pharmacist if you have questions or are unsure.    You can get addicted to pain  medicines, especially if you have a history of addiction (chemical, alcohol or substance dependence). Talk to your care team about ways to reduce this risk.    All opioids tend to cause constipation. Drink plenty of water and eat foods that have a lot of fiber, such as fruits, vegetables, prune juice, apple juice and high-fiber cereal. Take a laxative (Miralax, milk of magnesia, Colace, Senna) if you don t move your bowels at least every other day. Other side effects include upset stomach, sleepiness, dizziness, throwing up, tolerance (needing more of the medicine to have the same effect), physical dependence and slowed breathing.    Store your pills in a secure place, locked if possible. We will not replace any lost or stolen medicine. If you don t finish your medicine, please throw away (dispose) as directed by your pharmacist. The [place] Pollution Control Agency has more information about safe disposal: https://www.pca.Replaced by [facility].[place]/living-green/managing-unwanted-medications             Medication List: This is a list of all your medications and when to take them. Check marks below indicate your daily home schedule. Keep this list as a reference.      Medications           Morning Afternoon Evening Bedtime As Needed    ferrous sulfate 325 (65 Fe) MG tablet   Commonly known as:  IRON   Take 1 tablet (325 mg) by mouth 2 times daily                                ibuprofen 600 MG tablet   Commonly known as:  ADVIL/MOTRIN   Take 1 tablet (600 mg) by mouth every 6 hours as needed for other (cramping)   Last time this was given:  600 mg on 11/22/2018  5:07 AM                                oxyCODONE 5 MG tablet   Commonly known as:  ROXICODONE   Take 1-2 tablets (5-10 mg) by mouth every 3 hours as needed for moderate to severe pain   Last time this was given:  5 mg on 11/22/2018  8:04 AM                                PRENATAL VITAMIN PO   Last time this was given:  1 tablet on 11/22/2018  7:50 AM                                 valACYclovir 1000 mg tablet   Commonly known as:  VALTREX   Take 1 tablet (1,000 mg) by mouth daily

## 2018-11-19 NOTE — PROGRESS NOTES
[facility] Labor and Delivery Progress Note    Felipa Bermudez MRN# [id number]   Age: 33 year old YOB: 1985           Subjective:       Feeling more uncomfortable from the contractions       Objective:   Patient Vitals for the past 8 hrs:   BP Temp Temp src Resp   18 1645 124/59 98.2  F (36.8  C) Temporal 16   18 1550 115/63 - - 16   18 1530 116/63 - - 16   18 1427 126/77 97.7  F (36.5  C) Temporal 16   18 1303 110/72 97.5  F (36.4  C) Temporal 16        Cervical Exam:  / --      Position: Mid    Membranes: AROM with thin meconium     Fetal Heart Rate:    Monitor: External US    Variability: Moderate    Baseline Rate: 130 bpm    Fetal Heart Rate Tracing: cat I since admission.           Assessment:   Felipa Bermudez is a 33 year old  who is 38w2d here with early latent labor and cat II tracing in OB triage          Plan:   Augmentation performed with amniotomy.   Will proceed with oxytocin per protocol  Thin meconium noted at time of amniotomy and implication of this explained to the patient.   Consent for TOLAC signed with the patient.     Jodi Joel MD

## 2018-11-19 NOTE — PROGRESS NOTES
1300-Felipa is a 34yo  38w2d presents to MAC with small amount of bleeding in the toilet,  No clots. Small amount of bleeding on pad. Pt was in MAC last evening to for bleeding assessment, r/o labor. Had SVE at that time was fingertip. States she had cxns q 10-15 mins all night. Now q 8-10 mins. +FM. Hx of C/S for fetal intolerance to labor. Desires TOLAC.  1306-SVE 1cm/85%/-3 station.   1345-Dr Joel returned page. Discussed POC, deceleration. Plan to admit. Will come assess in person.   14-Dr Joel at bedside to assess and discuss POC. Pt desires to proceed with TOLAC.  1420-Report given to Glenny SAMPSON RN to assume all cares. Pt transferred to L/D room 232 ambulatory.

## 2018-11-19 NOTE — IP AVS SNAPSHOT
[address]    [place] [address]    Phone:  [phone]                                       After Visit Summary   11/19/2018    Felipa Bermudez    MRN: [id number]           After Visit Summary Signature Page     I have received my discharge instructions, and my questions have been answered. I have discussed any challenges I see with this plan with the nurse or doctor.    ..........................................................................................................................................  Patient/Patient Representative Signature      ..........................................................................................................................................  Patient Representative Print Name and Relationship to Patient    ..................................................               ................................................  Date                                   Time    ..........................................................................................................................................  Reviewed by Signature/Title    ...................................................              ..............................................  Date                                               Time          22EPIC Rev 08/18

## 2018-11-19 NOTE — H&P
[facility]    History and Physical  Obstetrics and Gynecology     Date of Admission:  2018    Assessment & Plan   Felipa Bermudez is a 33 year old  at 38+2wga with a history of prior  section in early latent labor here with cat II fetal tracing at term.     ASSESSMENT:   IUP @ 38w2d  Cat Ii fetal tracing however overall reassuring and does not preclude a trial of labor      PLAN:   Admit for delivery. Felipa counseled about the increased risk of needing a  section for fetal distress. She would still like to try a vaginal birth after  section. We will start oxytocin to have a contraction stress test. If reassuring will proceed with amniotomy and continued induction. If non-reassuring with recurrent decelerations will move towards  section. She understands the risk of uterine scar dehiscence and rupture with labor augmentation.      Sterile speculum exam at the time of Amniotomy    Jodi Joel MD    History of Present Illness   Felipa Bermudez is a 33 year old female  38w2d  Estimated Date of Delivery: Dec 1, 2018 is calculated from Patient's last menstrual period was 2018. is [facility] for non-reassuring fetal heart tracing at term. She had one prior  section. Prenatal care is complicated by the patient  from her .    PRENATAL COURSE  Prenatal course was complicated by as stated above.      Recent Labs   Lab Test  18   1515   ABO  A   RH  Pos   AS  Neg     Rhogam not indicated   Recent Labs   Lab Test  18   1615  18   1514   HEPBANG   --   Nonreactive   HIAGAB   --   Nonreactive   GBS  Negative   --    RUQIGG   --   31         Prior to Admission Medications   Prior to Admission Medications   Prescriptions Last Dose Informant Patient Reported? Taking?   CEPHALEXIN PO   Yes No   Prenatal Vit-Fe Fumarate-FA (PRENATAL VITAMIN PO)   Yes No   valACYclovir (VALTREX) 1000 mg  tablet   No No   Sig: Take 1 tablet (1,000 mg) by mouth daily      Facility-Administered Medications: None     Allergies   Allergies   Allergen Reactions     No Known Allergies          Immunization History   Immunization History   Administered Date(s) Administered     Influenza Vaccine IM 3yrs+ 4 Valent IIV4 2018     TDAP Vaccine (Adacel) 2018     Tdap (Adacel,Boostrix) 10/17/2006       Past Medical History:   Diagnosis Date     Herpes        Past Surgical History:   Procedure Laterality Date     C  DELIVERY ONLY  03    , Low Cervical       Vitals:    18 1303   BP: 110/72   Resp: 16   Temp: 97.5  F (36.4  C)   TempSrc: Temporal         Abdomen: gravid, single vertex fetus, non-tender, EFW 8 lbs     Constitutional: healthy, alert, active and no distress   Extremities: NT, no edema  Neurologic: Awake, alert, oriented x3  Neuropsychiatric: General: normal, calm and normal eye contact  Heart: Regular rate and rhythm  Lungs: clear to ausculation bilaterally    Jodi Joel MD   Statement Selected

## 2018-11-20 LAB
ERYTHROCYTE [DISTWIDTH] IN BLOOD BY AUTOMATED COUNT: 13.3 % (ref 10–15)
HCT VFR BLD AUTO: 30.1 % (ref 35–47)
HGB BLD-MCNC: 10.3 G/DL (ref 11.7–15.7)
MCH RBC QN AUTO: 30.4 PG (ref 26.5–33)
MCHC RBC AUTO-ENTMCNC: 34.2 G/DL (ref 31.5–36.5)
MCV RBC AUTO: 89 FL (ref 78–100)
PLATELET # BLD AUTO: 181 10E9/L (ref 150–450)
RBC # BLD AUTO: 3.39 10E12/L (ref 3.8–5.2)
T PALLIDUM AB SER QL: NONREACTIVE
WBC # BLD AUTO: 14 10E9/L (ref 4–11)

## 2018-11-20 PROCEDURE — 25000125 ZZHC RX 250: Performed by: OBSTETRICS & GYNECOLOGY

## 2018-11-20 PROCEDURE — 36415 COLL VENOUS BLD VENIPUNCTURE: CPT | Performed by: OBSTETRICS & GYNECOLOGY

## 2018-11-20 PROCEDURE — 25000132 ZZH RX MED GY IP 250 OP 250 PS 637

## 2018-11-20 PROCEDURE — 25000128 H RX IP 250 OP 636: Performed by: OBSTETRICS & GYNECOLOGY

## 2018-11-20 PROCEDURE — 25000128 H RX IP 250 OP 636

## 2018-11-20 PROCEDURE — 25000132 ZZH RX MED GY IP 250 OP 250 PS 637: Performed by: OBSTETRICS & GYNECOLOGY

## 2018-11-20 PROCEDURE — 12000037 ZZH R&B POSTPARTUM INTERMEDIATE

## 2018-11-20 PROCEDURE — 85027 COMPLETE CBC AUTOMATED: CPT | Performed by: OBSTETRICS & GYNECOLOGY

## 2018-11-20 RX ORDER — ACETAMINOPHEN 325 MG/1
975 TABLET ORAL EVERY 8 HOURS
Status: DISCONTINUED | OUTPATIENT
Start: 2018-11-20 | End: 2018-11-22 | Stop reason: HOSPADM

## 2018-11-20 RX ORDER — KETOROLAC TROMETHAMINE 30 MG/ML
INJECTION, SOLUTION INTRAMUSCULAR; INTRAVENOUS
Status: COMPLETED
Start: 2018-11-20 | End: 2018-11-20

## 2018-11-20 RX ORDER — ACETAMINOPHEN 325 MG/1
TABLET ORAL
Status: COMPLETED
Start: 2018-11-20 | End: 2018-11-20

## 2018-11-20 RX ORDER — OXYTOCIN/0.9 % SODIUM CHLORIDE 30/500 ML
340 PLASTIC BAG, INJECTION (ML) INTRAVENOUS CONTINUOUS PRN
Status: DISCONTINUED | OUTPATIENT
Start: 2018-11-20 | End: 2018-11-22 | Stop reason: HOSPADM

## 2018-11-20 RX ORDER — IBUPROFEN 600 MG/1
600 TABLET, FILM COATED ORAL EVERY 6 HOURS PRN
Status: DISCONTINUED | OUTPATIENT
Start: 2018-11-20 | End: 2018-11-22 | Stop reason: HOSPADM

## 2018-11-20 RX ORDER — METOCLOPRAMIDE HYDROCHLORIDE 5 MG/ML
10 INJECTION INTRAMUSCULAR; INTRAVENOUS EVERY 6 HOURS PRN
Status: DISCONTINUED | OUTPATIENT
Start: 2018-11-20 | End: 2018-11-22 | Stop reason: HOSPADM

## 2018-11-20 RX ORDER — KETOROLAC TROMETHAMINE 30 MG/ML
30 INJECTION, SOLUTION INTRAMUSCULAR; INTRAVENOUS EVERY 6 HOURS
Status: COMPLETED | OUTPATIENT
Start: 2018-11-20 | End: 2018-11-20

## 2018-11-20 RX ORDER — AMOXICILLIN 250 MG
1 CAPSULE ORAL 2 TIMES DAILY PRN
Status: DISCONTINUED | OUTPATIENT
Start: 2018-11-20 | End: 2018-11-22 | Stop reason: HOSPADM

## 2018-11-20 RX ORDER — DIPHENHYDRAMINE HCL 25 MG
25 CAPSULE ORAL EVERY 6 HOURS PRN
Status: DISCONTINUED | OUTPATIENT
Start: 2018-11-20 | End: 2018-11-22 | Stop reason: HOSPADM

## 2018-11-20 RX ORDER — NALOXONE HYDROCHLORIDE 0.4 MG/ML
.1-.4 INJECTION, SOLUTION INTRAMUSCULAR; INTRAVENOUS; SUBCUTANEOUS
Status: DISCONTINUED | OUTPATIENT
Start: 2018-11-20 | End: 2018-11-22 | Stop reason: HOSPADM

## 2018-11-20 RX ORDER — OXYCODONE HYDROCHLORIDE 5 MG/1
5-10 TABLET ORAL
Status: DISCONTINUED | OUTPATIENT
Start: 2018-11-20 | End: 2018-11-22 | Stop reason: HOSPADM

## 2018-11-20 RX ORDER — CARBOPROST TROMETHAMINE 250 UG/ML
250 INJECTION, SOLUTION INTRAMUSCULAR
Status: DISCONTINUED | OUTPATIENT
Start: 2018-11-20 | End: 2018-11-22 | Stop reason: HOSPADM

## 2018-11-20 RX ORDER — BISACODYL 10 MG
10 SUPPOSITORY, RECTAL RECTAL DAILY PRN
Status: DISCONTINUED | OUTPATIENT
Start: 2018-11-22 | End: 2018-11-22 | Stop reason: HOSPADM

## 2018-11-20 RX ORDER — PRENATAL VIT/IRON FUM/FOLIC AC 27MG-0.8MG
1 TABLET ORAL DAILY
Status: DISCONTINUED | OUTPATIENT
Start: 2018-11-20 | End: 2018-11-22 | Stop reason: HOSPADM

## 2018-11-20 RX ORDER — DIPHENHYDRAMINE HYDROCHLORIDE 50 MG/ML
25 INJECTION INTRAMUSCULAR; INTRAVENOUS EVERY 6 HOURS PRN
Status: DISCONTINUED | OUTPATIENT
Start: 2018-11-20 | End: 2018-11-22 | Stop reason: HOSPADM

## 2018-11-20 RX ORDER — AMOXICILLIN 250 MG
2 CAPSULE ORAL 2 TIMES DAILY PRN
Status: DISCONTINUED | OUTPATIENT
Start: 2018-11-20 | End: 2018-11-22 | Stop reason: HOSPADM

## 2018-11-20 RX ORDER — OXYTOCIN 10 [USP'U]/ML
10 INJECTION, SOLUTION INTRAMUSCULAR; INTRAVENOUS
Status: DISCONTINUED | OUTPATIENT
Start: 2018-11-20 | End: 2018-11-22 | Stop reason: HOSPADM

## 2018-11-20 RX ORDER — ONDANSETRON 2 MG/ML
4 INJECTION INTRAMUSCULAR; INTRAVENOUS EVERY 6 HOURS PRN
Status: DISCONTINUED | OUTPATIENT
Start: 2018-11-20 | End: 2018-11-22 | Stop reason: HOSPADM

## 2018-11-20 RX ORDER — DEXTROSE, SODIUM CHLORIDE, SODIUM LACTATE, POTASSIUM CHLORIDE, AND CALCIUM CHLORIDE 5; .6; .31; .03; .02 G/100ML; G/100ML; G/100ML; G/100ML; G/100ML
INJECTION, SOLUTION INTRAVENOUS CONTINUOUS
Status: DISCONTINUED | OUTPATIENT
Start: 2018-11-20 | End: 2018-11-22 | Stop reason: HOSPADM

## 2018-11-20 RX ORDER — PROCHLORPERAZINE 25 MG
25 SUPPOSITORY, RECTAL RECTAL EVERY 12 HOURS PRN
Status: DISCONTINUED | OUTPATIENT
Start: 2018-11-20 | End: 2018-11-22 | Stop reason: HOSPADM

## 2018-11-20 RX ORDER — ACETAMINOPHEN 325 MG/1
650 TABLET ORAL EVERY 4 HOURS PRN
Status: DISCONTINUED | OUTPATIENT
Start: 2018-11-23 | End: 2018-11-22 | Stop reason: HOSPADM

## 2018-11-20 RX ORDER — OXYTOCIN/0.9 % SODIUM CHLORIDE 30/500 ML
100 PLASTIC BAG, INJECTION (ML) INTRAVENOUS CONTINUOUS
Status: DISCONTINUED | OUTPATIENT
Start: 2018-11-20 | End: 2018-11-22 | Stop reason: HOSPADM

## 2018-11-20 RX ORDER — LANOLIN 100 %
OINTMENT (GRAM) TOPICAL
Status: DISCONTINUED | OUTPATIENT
Start: 2018-11-20 | End: 2018-11-22 | Stop reason: HOSPADM

## 2018-11-20 RX ORDER — HYDROCORTISONE 2.5 %
CREAM (GRAM) TOPICAL 3 TIMES DAILY PRN
Status: DISCONTINUED | OUTPATIENT
Start: 2018-11-20 | End: 2018-11-22 | Stop reason: HOSPADM

## 2018-11-20 RX ORDER — LIDOCAINE 40 MG/G
CREAM TOPICAL
Status: DISCONTINUED | OUTPATIENT
Start: 2018-11-20 | End: 2018-11-22 | Stop reason: HOSPADM

## 2018-11-20 RX ORDER — SIMETHICONE 80 MG
80 TABLET,CHEWABLE ORAL 4 TIMES DAILY PRN
Status: DISCONTINUED | OUTPATIENT
Start: 2018-11-20 | End: 2018-11-22 | Stop reason: HOSPADM

## 2018-11-20 RX ORDER — METHYLERGONOVINE MALEATE 0.2 MG/ML
200 INJECTION INTRAVENOUS
Status: DISCONTINUED | OUTPATIENT
Start: 2018-11-20 | End: 2018-11-22 | Stop reason: HOSPADM

## 2018-11-20 RX ORDER — MISOPROSTOL 200 UG/1
800 TABLET ORAL
Status: DISCONTINUED | OUTPATIENT
Start: 2018-11-20 | End: 2018-11-22 | Stop reason: HOSPADM

## 2018-11-20 RX ORDER — HYDROMORPHONE HYDROCHLORIDE 1 MG/ML
.3-.5 INJECTION, SOLUTION INTRAMUSCULAR; INTRAVENOUS; SUBCUTANEOUS EVERY 30 MIN PRN
Status: DISCONTINUED | OUTPATIENT
Start: 2018-11-20 | End: 2018-11-22 | Stop reason: HOSPADM

## 2018-11-20 RX ADMIN — OXYTOCIN-SODIUM CHLORIDE 0.9% IV SOLN 30 UNIT/500ML 100 ML/HR: 30-0.9/5 SOLUTION at 00:10

## 2018-11-20 RX ADMIN — SENNOSIDES AND DOCUSATE SODIUM 1 TABLET: 8.6; 5 TABLET ORAL at 21:04

## 2018-11-20 RX ADMIN — KETOROLAC TROMETHAMINE 30 MG: 30 INJECTION, SOLUTION INTRAMUSCULAR at 19:35

## 2018-11-20 RX ADMIN — ACETAMINOPHEN 975 MG: 325 TABLET, FILM COATED ORAL at 02:00

## 2018-11-20 RX ADMIN — KETOROLAC TROMETHAMINE 30 MG: 30 INJECTION, SOLUTION INTRAMUSCULAR at 00:49

## 2018-11-20 RX ADMIN — SENNOSIDES AND DOCUSATE SODIUM 1 TABLET: 8.6; 5 TABLET ORAL at 08:50

## 2018-11-20 RX ADMIN — ACETAMINOPHEN 975 MG: 325 TABLET, FILM COATED ORAL at 19:35

## 2018-11-20 RX ADMIN — SODIUM CHLORIDE, SODIUM LACTATE, POTASSIUM CHLORIDE, CALCIUM CHLORIDE AND DEXTROSE MONOHYDRATE: 5; 600; 310; 30; 20 INJECTION, SOLUTION INTRAVENOUS at 08:37

## 2018-11-20 RX ADMIN — ACETAMINOPHEN 975 MG: 325 TABLET, FILM COATED ORAL at 10:07

## 2018-11-20 RX ADMIN — KETOROLAC TROMETHAMINE 30 MG: 30 INJECTION, SOLUTION INTRAMUSCULAR at 06:56

## 2018-11-20 RX ADMIN — KETOROLAC TROMETHAMINE 30 MG: 30 INJECTION, SOLUTION INTRAMUSCULAR at 12:55

## 2018-11-20 RX ADMIN — METOCLOPRAMIDE 10 MG: 5 INJECTION, SOLUTION INTRAMUSCULAR; INTRAVENOUS at 12:09

## 2018-11-20 RX ADMIN — PRENATAL VIT W/ FE FUMARATE-FA TAB 27-0.8 MG 1 TABLET: 27-0.8 TAB at 08:50

## 2018-11-20 RX ADMIN — SODIUM CHLORIDE, SODIUM LACTATE, POTASSIUM CHLORIDE, CALCIUM CHLORIDE AND DEXTROSE MONOHYDRATE: 5; 600; 310; 30; 20 INJECTION, SOLUTION INTRAVENOUS at 16:27

## 2018-11-20 RX ADMIN — OXYTOCIN-SODIUM CHLORIDE 0.9% IV SOLN 30 UNIT/500ML 100 ML/HR: 30-0.9/5 SOLUTION at 03:36

## 2018-11-20 NOTE — PLAN OF CARE
Problem: Patient Care Overview  Goal: Plan of Care/Patient Progress Review  Outcome: Declining  VSS. Pt feeling dizzy this AM, Hgb 10.3. Emesisx2, Reglan given with pt improvement. Pt denies pain. Assisted pt to stand at side of bed, alexis care complete. Breastfeeding infant well.

## 2018-11-20 NOTE — ANESTHESIA POSTPROCEDURE EVALUATION
Patient: Felipa Bermudez    * No procedures listed *    Diagnosis:* No pre-op diagnosis entered *  Diagnosis Additional Information: No value filed.    Anesthesia Type:  No value filed.    Note:  Anesthesia Post Evaluation    Patient location during evaluation: bedside  Patient participation: Able to fully participate in evaluation  Level of consciousness: awake  Pain management: adequate  Airway patency: patent  Cardiovascular status: acceptable  Respiratory status: acceptable  Hydration status: acceptable  PONV: none     Anesthetic complications: None    Comments: No anesthetic complications noted. Denies HA. Motor/Sensation intact bilaterally. Mild soreness at insertion site. Pleased with epidural management.         Last vitals:  Vitals:    11/20/18 1300 11/20/18 1400 11/20/18 1451   BP:      Pulse:      Resp: 16 16 16   Temp:      SpO2: 98% 100% 99%         Electronically Signed By: Andriy Alonso DO, DO  November 20, 2018  4:00 PM

## 2018-11-20 NOTE — PROVIDER NOTIFICATION
Dr. Joel notified of patient SVE.  Plan to come in and assess.  Will continue to monitor and update.

## 2018-11-20 NOTE — ANESTHESIA PROCEDURE NOTES
Peripheral nerve/Neuraxial procedure note : epidural catheter  Pre-Procedure  Performed by CRISTINE AMBROSE  Location: OB      Pre-Anesthestic Checklist: patient identified, IV checked, risks and benefits discussed, informed consent, monitors and equipment checked, pre-op evaluation and at physician/surgeon's request    Timeout  Correct Patient: Yes   Correct Procedure: Yes   Correct Site: Yes   Correct Laterality: N/A   Correct Position: Yes   Site Marked: N/A   .   Procedure Documentation    Diagnosis:Labor Pain.    Procedure:    Epidural catheter.  Insertion Site:L3-4  (midline approach) Injection technique: LORT saline   Local skin infiltrated with mL of 1% lidocaine.  MAHI at 5 cm     Patient Prep;mask, sterile gloves, povidone-iodine 7.5% surgical scrub, patient draped.  .  Needle: Touhy needle Needle Gauge: 17.    Needle Length (Inches) 5  # of attempts: 1 and # of redirects:  .   Catheter: 19 G . .  Catheter threaded easily  4 cm epidural space.  10 cm at skin.   .    Assessment/Narrative  Paresthesias: No.  .  .  Aspiration negative for heme or CSF  . Test dose of 3 mL lidocaine 1.5% w/ 1:200,000 epinephrine at. Test dose negative for signs of intravascular, subdural or intrathecal injection. Comments:  Catheter secured with adhesive spray, tegaderm, and tape.

## 2018-11-20 NOTE — ANESTHESIA PREPROCEDURE EVALUATION
Anesthesia Evaluation       history and physical reviewed .      No history of anesthetic complications          ROS/MED HX    ENT/Pulmonary:       Neurologic:       Cardiovascular:         METS/Exercise Tolerance:     Hematologic:         Musculoskeletal:         GI/Hepatic:         Renal/Genitourinary:         Endo:         Psychiatric:         Infectious Disease:         Malignancy:         Other:                                PREVIOUS C/SECTION           Anesthesia Plan      History & Physical Review      ASA Status:  .  OB Epidural Asa: 2            Postoperative Care      Consents  Anesthetic plan, risks, benefits and alternatives discussed with:  Patient..                          .

## 2018-11-20 NOTE — PROVIDER NOTIFICATION
Dr. Joel called for update on patient status.  Made aware of SVE 2/70/-3 and continued vaginal bleeding.  Patient comfortable with epidural.  Will continue to monitor and update.

## 2018-11-20 NOTE — PROGRESS NOTES
S: Pt doing well. Infant is being Bottlefed. Pain is controlled with current analgesics.  Medication(s) being used: acetaminophen and duramorph and toradol. Didn't have nausea until just now. Had PNV and senna and had emesis. Now the nausea is all gone. Thinking maybe it was just pills on a somewhat empty stomach. Is hungry though and would like to eat. Tolerated jello earlier w/o issue. Feeling weak and a little dizzy    O: /63 (BP Location: Left arm)  Pulse 61  Temp 98.1  F (36.7  C) (Oral)  Resp 16  LMP 02/24/2018  SpO2 99%  Breastfeeding? Unknown        ABD:  Uterine fundus is firm, non-tender and at the level of the umbilicus       INC: clean/dry/intact other than previously marked drainage w/o extension                Hemoglobin   Date Value Ref Range Status   11/19/2018 12.2 11.7 - 15.7 g/dL Final            Lab Results   Component Value Date    RH Pos 11/19/2018       A: Post-op Day #1 s/p C/Section    P: Continue Post Op Cares  chck cbc  Advance diet and ambulation slowly today

## 2018-11-20 NOTE — PLAN OF CARE
Problem: Patient Care Overview  Goal: Plan of Care/Patient Progress Review  Outcome: No Change  Patient transferred to room 405 from L&D PACU at 0215, with  in arms and mother at bedside. Report received from Hannah EATON RN and  ID bands verified. Patient and mother oriented to  safety and security, call system, unit routines, and new patient folder/paperwork. Vital signs stable, assessment WNL. Incision dressing marked by previous RN, no change overnight. Pain controlled with scheduled Toradol and Tylenol; states satisfaction with pain management. Josue draining adequate amounts of clear, yellow urine.  not here overnight, patient verbalizes okay for  to come back to unit and verbalizes feeling safe if he returns at this time. Breastfeeding baby independently overnight. Encouraged to call RN with needs. Will continue to monitor.

## 2018-11-20 NOTE — BRIEF OP NOTE
[facility] Operative Note    Pre-operative diagnosis: Previous  Section declines further TOLAC. IUP at 38+2wga. Non-Reassuring Fetal Heart Tracing   Post-operative diagnosis Same as preoperative diagnosis   Procedure: Procedure(s):  Repeat Low Transverse  SECTION   Surgeon(s): Surgeon(s) and Role:     * Jodi Joel MD - Primary   Estimated blood loss: 700 mL Urine Output: 100 ml   Specimens:   ID Type Source Tests Collected by Time Destination   1 :  Tissue Umbilical Cord SEE PROVIDERS ORDERS Jodi Joel MD 2018 11:29 PM    2 :  Cord blood Umbilical Cord OR DOCUMENTATION ONLY Jodi Joel MD 2018 11:29 PM       Findings: Male infant in left occiput transverse position, cord around the neck X 1. Apgars 8,9. Weight 6 pounds 12 ounces. Normal uterus and bilateral fallopian tubes and ovaries.   Dictation Reference Number: [id number]

## 2018-11-20 NOTE — ANESTHESIA POSTPROCEDURE EVALUATION
Patient: Felipa Bermudez    Procedure(s):   SECTION    Diagnosis:pregnancy  Diagnosis Additional Information: No value filed.    Anesthesia Type:  Epidural    Note:  Anesthesia Post Evaluation    Patient location during evaluation: PACU  Patient participation: Able to fully participate in evaluation  Level of consciousness: awake and alert  Pain management: adequate  Airway patency: patent  Cardiovascular status: acceptable  Respiratory status: acceptable and unassisted  Hydration status: acceptable  PONV: none             Last vitals:  Vitals:    18 2100 18 2130 18 2200   BP:  124/60 114/56   Resp:   16   Temp: 36.7  C (98  F)  36.7  C (98.1  F)   SpO2: 100% 100% 99%         Electronically Signed By: Dakota Dwyer MD  2018  12:18 AM

## 2018-11-20 NOTE — LACTATION NOTE
Initial visit with SHELBI Leo and baby.    Breastfeeding general information reviewed.   Advised to breastfeed exclusively, on demand, avoid pacifiers, bottles and formula unless medically indicated.  Encouraged rooming in, skin to skin, feeding on demand 8-12x/day or sooner if baby cues.  Explained benefits of holding and skin to skin.  Encouraged lots of skin to skin. Instructed on hand expression. Outpatient resource phone numbers given. Plans to get breast pump for home.  Continues to nurse well per mom. No further questions at this time.   Will follow as needed.   Sanjuanita Interiano BSN, RN, PHN, RNC-MNN, IBCLC

## 2018-11-20 NOTE — PLAN OF CARE
Problem: Patient Care Overview  Goal: Plan of Care/Patient Progress Review  Outcome: No Change  Data: Felipa Bermudez transferred to 405 via wheelchair at 0210. Baby transferred via parent's arms.  Action: Receiving unit notified of transfer: Yes. Patient and family notified of room change. Report given to Jessi PHAM RN at 0210. Belongings sent to receiving unit. Accompanied by Registered Nurse. Oriented patient to surroundings. Call light within reach. ID bands double-checked with receiving RN.  Response: Patient tolerated transfer and is stable.

## 2018-11-20 NOTE — ANESTHESIA CARE TRANSFER NOTE
Patient: Felipa Bermudez    Procedure(s):   SECTION    Diagnosis: pregnancy  Diagnosis Additional Information: No value filed.    Anesthesia Type:   Epidural     Note:  Airway :Room Air  Patient transferred to:PACU  Comments: Pt exhibits spontaneous respirations, all monitors and alarms on in PACU, VSS, patent IV, report and transfer of care to RN.  Handoff Report: Identifed the Patient, Identified the Reponsible Provider, Reviewed the pertinent medical history, Discussed the surgical course, Reviewed Intra-OP anesthesia mangement and issues during anesthesia, Set expectations for post-procedure period and Allowed opportunity for questions and acknowledgement of understanding      Vitals: (Last set prior to Anesthesia Care Transfer)    CRNA VITALS  2018 2333 - 2018 0011      2018             Resp Rate (set): 10                Electronically Signed By: OSWALDO Richard CRNA  2018  12:11 AM

## 2018-11-20 NOTE — OP NOTE
Procedure Date: 2018      PREOPERATIVE DIAGNOSES:     1.  Previous  section, declines further trial of labor after  section.   2.  Intrauterine pregnancy at 38+2 weeks gestational age.    3.  Non-reassuring fetal heart tracing with late decelerations in Obstetrics Triage in the setting of third trimester bleeding.      POSTOPERATIVE DIAGNOSES:   1.  Previous  section, declines further trial of labor after  section.   2.  Intrauterine pregnancy at 38+2 weeks gestational age.    3.  Non-reassuring fetal heart tracing with late decelerations in Obstetrics Triage in the setting of third trimester bleeding.       PROCEDURE PERFORMED:  Repeat low transverse  section.      PRIMARY SURGEON:  Dr. Jodi Joel.      ESTIMATED BLOOD LOSS:  700 mL.      URINE OUTPUT:  100 mL.      SPECIMENS:  None.      FINDINGS:  Male infant in left occiput transverse position, unengaged into the pelvis. Nuchal cord X 1.  Apgars 8 and 9, weight 6 pounds 12 ounces.  Normal uterus, bilateral fallopian tubes and ovaries.      INDICATIONS:  The patient is a 33-year-old  2, now para 2-0-0-2 who had presented to Labor and Delivery due to persistent vaginal bleeding.  Upon presentation, she was noted to be 1 cm and had previously been fingertip dilated.  There was bright red vaginal bleeding noted.  In the monitoring center, she had several late decelerations; therefore, she was admitted due to nonreassuring fetal heart tracing at term.  On initial admission, the patient desired a trial of labor after  section.  She then was augmented with amniotomy as well as oxytocin.  There was thin meconium-stained fluid after amniotomy.      The patient was augmented with oxytocin; however, she was noted to continue to have a high fetal station after multiple exams.  After 4 hours of ruptured membranes, she remained at 2 cm dilated.  Due to her high station, the patient became discouraged about  proceeding with a vaginal delivery and opted for a repeat low transverse  section.      DESCRIPTION OF PROCEDURE:  The patient was taken to the operating room with IV fluids running.  She received Ancef and azithromycin for infection prophylaxis.  She then had her epidural dosed to a surgical level.  Fetal heart tones were auscultated.  The Josue catheter was already present.  She was then prepped and draped in the usual sterile fashion.  After final timeout was obtained, a Pfannenstiel skin incision was made at the point of her prior scar, carried down to the level of the fascia.  Fascia was incised in the midline and extended superiorly and inferiorly.  The peritoneum was entered into after  the rectus muscles in the midline.  There were some adhesions between the omentum and the anterior uterine surface.  This was able to be dissected bluntly.  The bladder blade was then placed against the lower uterine segment.  A low transverse incision was made again in the lower uterine segment and the infant was noted to be in left occiput transverse position, unengaged into the pelvis with the umbilical cord around his neck x1.  He was delivered.  Mouth and nose were suctioned.  Delayed cord clamping was performed and he was handed off to the waiting  nurse.  Next, the placenta was delivered via external massage of the uterus.  The uterus was cleaned of all clots and debris and was reapproximated in 2 layers using 0 Vicryl suture.  There was some additional bleeding after the hysterotomy.  This was repaired with 0 Vicryl suture in a figure-of-eight stitch.  Bilateral fallopian tubes and ovaries were noted to be of normal anatomy.  The peritoneum was closed with 0 plain gut in a running fashion.  Next, the fascia was reapproximated using 0 Vicryl suture in a running fashion.  The subcutaneous layer was irrigated and was reapproximated using 0 plain gut.  Next, the skin was then reapproximated using  3-0 Monocryl in subcuticular fashion.  At the end of the procedure, all instrument counts were noted to be correct x3.  The patient tolerated the procedure well without difficulty.         HANNAH JOLLEY MD             D: 2018   T: 2018   MT: VLAD      Name:     BIJAL HOWARD   MRN:      [id number]        Account:        [id number]   :      1985           Procedure Date: 2018      Document: [id number]

## 2018-11-20 NOTE — ANESTHESIA PREPROCEDURE EVALUATION
Anesthesia Evaluation     . Pt has had prior anesthetic.     No history of anesthetic complications          ROS/MED HX    ENT/Pulmonary:      (-) sleep apnea   Neurologic:  - neg neurologic ROS     Cardiovascular:        (-) hypertension   METS/Exercise Tolerance:     Hematologic:         Musculoskeletal:         GI/Hepatic:  - neg GI/hepatic ROS      (-) GERD   Renal/Genitourinary:  - ROS Renal section negative       Endo:  - neg endo ROS       Psychiatric:         Infectious Disease:         Malignancy:         Other:    (+) Possibly pregnant                    Physical Exam  Normal systems: cardiovascular, pulmonary and dental    Airway   Mallampati: II  TM distance: >3 FB  Neck ROM: full    Dental     Cardiovascular       Pulmonary                     Anesthesia Plan      History & Physical Review  History and physical reviewed and following examination; no interval change.    ASA Status:  2 .    NPO Status:  > 8 hours    Plan for Epidural   PONV prophylaxis:  Ondansetron (or other 5HT-3)       Postoperative Care  Postoperative pain management:  IV analgesics.      Consents  Anesthetic plan, risks, benefits and alternatives discussed with:  Patient..                          .

## 2018-11-21 LAB — HGB BLD-MCNC: 9.9 G/DL (ref 11.7–15.7)

## 2018-11-21 PROCEDURE — 36415 COLL VENOUS BLD VENIPUNCTURE: CPT | Performed by: OBSTETRICS & GYNECOLOGY

## 2018-11-21 PROCEDURE — 12000037 ZZH R&B POSTPARTUM INTERMEDIATE

## 2018-11-21 PROCEDURE — 85018 HEMOGLOBIN: CPT | Performed by: OBSTETRICS & GYNECOLOGY

## 2018-11-21 PROCEDURE — 25000132 ZZH RX MED GY IP 250 OP 250 PS 637: Performed by: OBSTETRICS & GYNECOLOGY

## 2018-11-21 RX ADMIN — ACETAMINOPHEN 975 MG: 325 TABLET, FILM COATED ORAL at 02:20

## 2018-11-21 RX ADMIN — ACETAMINOPHEN 975 MG: 325 TABLET, FILM COATED ORAL at 12:01

## 2018-11-21 RX ADMIN — PRENATAL VIT W/ FE FUMARATE-FA TAB 27-0.8 MG 1 TABLET: 27-0.8 TAB at 07:49

## 2018-11-21 RX ADMIN — OXYCODONE HYDROCHLORIDE 10 MG: 5 TABLET ORAL at 20:04

## 2018-11-21 RX ADMIN — IBUPROFEN 600 MG: 600 TABLET, FILM COATED ORAL at 07:49

## 2018-11-21 RX ADMIN — SENNOSIDES AND DOCUSATE SODIUM 1 TABLET: 8.6; 5 TABLET ORAL at 07:50

## 2018-11-21 RX ADMIN — IBUPROFEN 600 MG: 600 TABLET, FILM COATED ORAL at 16:58

## 2018-11-21 RX ADMIN — OXYCODONE HYDROCHLORIDE 5 MG: 5 TABLET ORAL at 07:50

## 2018-11-21 RX ADMIN — SENNOSIDES AND DOCUSATE SODIUM 1 TABLET: 8.6; 5 TABLET ORAL at 20:04

## 2018-11-21 RX ADMIN — OXYCODONE HYDROCHLORIDE 10 MG: 5 TABLET ORAL at 23:24

## 2018-11-21 RX ADMIN — IBUPROFEN 600 MG: 600 TABLET, FILM COATED ORAL at 02:20

## 2018-11-21 RX ADMIN — ACETAMINOPHEN 975 MG: 325 TABLET, FILM COATED ORAL at 20:04

## 2018-11-21 RX ADMIN — OXYCODONE HYDROCHLORIDE 5 MG: 5 TABLET ORAL at 12:01

## 2018-11-21 RX ADMIN — OXYCODONE HYDROCHLORIDE 5 MG: 5 TABLET ORAL at 16:58

## 2018-11-21 RX ADMIN — IBUPROFEN 600 MG: 600 TABLET, FILM COATED ORAL at 23:24

## 2018-11-21 ASSESSMENT — ACTIVITIES OF DAILY LIVING (ADL)
FALL_HISTORY_WITHIN_LAST_SIX_MONTHS: NO
BATHING: 0-->INDEPENDENT
DRESS: 0-->INDEPENDENT
TOILETING: 0-->INDEPENDENT
SWALLOWING: 0-->SWALLOWS FOODS/LIQUIDS WITHOUT DIFFICULTY
COGNITION: 0 - NO COGNITION ISSUES REPORTED
RETIRED_EATING: 0-->INDEPENDENT
RETIRED_COMMUNICATION: 0-->UNDERSTANDS/COMMUNICATES WITHOUT DIFFICULTY
AMBULATION: 0-->INDEPENDENT
TRANSFERRING: 0-->INDEPENDENT
PRIOR_FUNCTIONAL_LEVEL_COMMENT: INDEPENDENT

## 2018-11-21 NOTE — PLAN OF CARE
Problem: Postpartum ( Delivery) (Adult,Obstetrics,Pediatric)  Goal: Signs and Symptoms of Listed Potential Problems Will be Absent, Minimized or Managed (Postpartum)  Signs and symptoms of listed potential problems will be absent, minimized or managed by discharge/transition of care (reference Postpartum ( Delivery) (Adult,Obstetrics,Pediatric) CPG).   VSS. Pain controlled with tylenol and ibuprofen. Voiding. Ambulating independently. Incision intact with steri strips. Breastfeeding well.

## 2018-11-21 NOTE — LACTATION NOTE
Routine visit. Continues to nurse well per mom.  Mother states it is going really well and that baby eats a lot. Discussed importance of letting baby eat as often as he wants to help with milk supply.  Mother denies any pain or soreness with feeding.  Advised to breastfeed exclusively and on demand. Encouraged rooming in, skin to skin, and feeding on demand 8-12x/day or sooner if baby cues.  No further questions at this time.  Will follow as needed.  Silke Paula RNC-IBCLC

## 2018-11-21 NOTE — PLAN OF CARE
Problem: Patient Care Overview  Goal: Plan of Care/Patient Progress Review  Outcome: Improving  Vss, pain controlled with toradol and tylenol. Fluids encouraged.

## 2018-11-21 NOTE — PLAN OF CARE
Problem: Patient Care Overview  Goal: Plan of Care/Patient Progress Review  Outcome: Improving  Pt feeling better after sleeping this afternoon.  IV and lisa aguila'paul. Pt assisted to shower, tolerated well. Breastfeeding infant well. Pain managed with Toradol and Tylenol.

## 2018-11-21 NOTE — PROGRESS NOTES
SW: Consult acknowledged. Discussed with bedside RN. Pt not appropriate to meet with at this time, AIRAM requested that this writer be contacted when SW visit appropriate.    YASH Day, [facility]  Daytime (8:00am-4:30pm): [phone]  After-Hours SW Pager (4:30pm-11:30pm): [phone]

## 2018-11-22 VITALS
SYSTOLIC BLOOD PRESSURE: 109 MMHG | OXYGEN SATURATION: 100 % | TEMPERATURE: 97.4 F | HEART RATE: 70 BPM | DIASTOLIC BLOOD PRESSURE: 65 MMHG | RESPIRATION RATE: 16 BRPM

## 2018-11-22 PROCEDURE — 25000132 ZZH RX MED GY IP 250 OP 250 PS 637: Performed by: OBSTETRICS & GYNECOLOGY

## 2018-11-22 RX ORDER — FERROUS SULFATE 325(65) MG
325 TABLET ORAL 2 TIMES DAILY
Qty: 30 TABLET | Refills: 0 | Status: SHIPPED | OUTPATIENT
Start: 2018-11-22 | End: 2019-01-24

## 2018-11-22 RX ORDER — IBUPROFEN 600 MG/1
600 TABLET, FILM COATED ORAL EVERY 6 HOURS PRN
Qty: 90 TABLET | Refills: 1 | Status: SHIPPED | OUTPATIENT
Start: 2018-11-22 | End: 2019-03-26

## 2018-11-22 RX ORDER — OXYCODONE HYDROCHLORIDE 5 MG/1
5-10 TABLET ORAL
Qty: 20 TABLET | Refills: 0 | Status: SHIPPED | OUTPATIENT
Start: 2018-11-22 | End: 2019-01-14

## 2018-11-22 RX ADMIN — PRENATAL VIT W/ FE FUMARATE-FA TAB 27-0.8 MG 1 TABLET: 27-0.8 TAB at 07:50

## 2018-11-22 RX ADMIN — OXYCODONE HYDROCHLORIDE 5 MG: 5 TABLET ORAL at 05:15

## 2018-11-22 RX ADMIN — OXYCODONE HYDROCHLORIDE 5 MG: 5 TABLET ORAL at 08:04

## 2018-11-22 RX ADMIN — ACETAMINOPHEN 975 MG: 325 TABLET, FILM COATED ORAL at 11:46

## 2018-11-22 RX ADMIN — IBUPROFEN 600 MG: 600 TABLET, FILM COATED ORAL at 05:07

## 2018-11-22 RX ADMIN — OXYCODONE HYDROCHLORIDE 5 MG: 5 TABLET ORAL at 10:52

## 2018-11-22 RX ADMIN — ACETAMINOPHEN 975 MG: 325 TABLET, FILM COATED ORAL at 05:08

## 2018-11-22 RX ADMIN — IBUPROFEN 600 MG: 600 TABLET, FILM COATED ORAL at 10:53

## 2018-11-22 NOTE — DISCHARGE INSTRUCTIONS
Postop  Birth Instructions    Activity       Do not lift more than 10 pounds for 6 weeks after surgery.  Ask family and friends for help when you need it.    No driving until you have stopped taking your pain medications (usually two weeks after surgery).    No heavy exercise or activity for 6 weeks.  Don't do anything that will put a strain on your surgery site.    Don't strain when using the toilet.  Your care team may prescribe a stool softener if you have problems with your bowel movements.     To care for your incision:       Keep the incision clean and dry.    Do not soak your incision in water. No swimming or hot tubs until it has fully healed. You may soak in the bathtub if the water level is below your incision.    Do not use peroxide, gel, cream, lotion, or ointment on your incision.    Adjust your clothes to avoid pressure on your surgery site (check the elastic in your underwear for example).     You may see a small amount of clear or pink drainage and this is normal.  Check with your health care provider:       If the drainage increases or has an odor.    If the incision reddens, you have swelling, or develop a rash.    If you have increased pain and the medicine we prescribed doesn't help.    If you have a fever above 100.4 F (38 C) with or without chills when placing thermometer under your tongue.   The area around your incision (surgery wound), will feel numb.  This is normal. The numbness should go away in less than a year.     Keep your hands clean:  Always wash your hands before touching your incision (surgery wound). This helps reduce your risk of infection. If your hands aren't dirty, you may use an alcohol hand-rub to clean your hands. Keep your nails clean and short.    Call your healthcare provider if you have any of these symptoms:       You soak a sanitary pad with blood within 1 hour, or you see blood clots larger than a golf ball.    Bleeding that lasts more than 6  weeks.    Vaginal discharge that smells bad.    Severe pain, cramping or tenderness in your lower belly area.    A need to urinate more frequently (use the toilet more often), more urgently (use the toilet very quickly), or it burns when you urinate.    Nausea and vomiting.    Redness, swelling or pain around a vein in your leg.    Problems breastfeeding or a red or painful area on your breast.    Chest pain and cough or are gasping for air.    Problems with coping with sadness, anxiety or depression. If you have concerns about hurting yourself or the baby, call your provider immediately.      You have questions or concerns after you return home.                DISCHARGE INSTRUCTIONS    Medications:  -May take Ibuprofen (800mg by mouth every 8 hours as needed for pain) or tylenol (500mg by mouth every 6 hours as needed for pain; max 4000mg per day) when at home as needed for pain/cramps/headaches/pain, follow instructions on bottle.  -You may use miralax (daily) or docusate (one tab by mouth twice daily) for stool softening, these are over the counter and can be found at any pharmacy. Follow bottle instructions.  -Continue prenatal vitamins.     Activity:  -Pelvic rest (no sex, tampons) for 6 weeks.   -General activity as tolerated, slowing increase daily. Avoid vigorous exercise, jumping or strength training for at least 4-6 weeks. Limit lifting to 10-15lb or less. You may drive when you are able to safely operate a motor vehicle and are no longer taking narcotic medications if they have been prescribed for you.    Precuations:  -Call doctor if heavy unexpected bleeding, fever of 100.4 or greater, foul vaginal discharge, severe abdominal pain not helped with medications or for any other concerns or questions. If you are having headaches not resolved by tylenol or ibuprofen, new or different vision changes then notify your doctor.    Follow Up Visit:  -Call the [facility] to schedule your 2 week early  PP visit and then a 6 week postpartum appointment, [phone].

## 2018-11-22 NOTE — PLAN OF CARE
Problem: Patient Care Overview  Goal: Plan of Care/Patient Progress Review  Outcome: No Change  Pt taking Tylenol, Ibuprofen and Oxycodone for pain management. Pt's parents at the bedside and supportive. On pathway. Encouraged pt to fill out depression screen and birth certificate as well as watching the videos. Encouraged pt to call with any needs or questions. Will continue to monitor.

## 2018-11-22 NOTE — PROGRESS NOTES
Obstetrics Postpartum Rounding Note, POD#3    S: Doing well. Pain controlled. Breast feeding. Minimal lochia. REady for discharge      O:   Vitals:    11/21/18 0300 11/21/18 0900 11/21/18 1540 11/22/18 0200   BP:  114/64 114/58 116/60   BP Location:       Pulse:       Resp: 16 16 18 16   Temp:  98.8  F (37.1  C) 99.2  F (37.3  C) 98.6  F (37  C)   TempSrc:  Oral Oral Oral   SpO2:           Gen: AOx3, NAD  Abd: soft, ND, mild approp ttp, FF@ unit(s)-2. Incision with steri strips.   Ext: no calf ttp, no edema    Labs:         Hemoglobin   Date Value Ref Range Status   11/21/2018 9.9 (L) 11.7 - 15.7 g/dL Final   11/20/2018 10.3 (L) 11.7 - 15.7 g/dL Final            Lab Results   Component Value Date    RH Pos 11/19/2018       Meds:  Current Facility-Administered Medications   Medication     [START ON 11/23/2018] acetaminophen (TYLENOL) tablet 650 mg     acetaminophen (TYLENOL) tablet 975 mg     bisacodyl (DULCOLAX) Suppository 10 mg     carboprost (HEMABATE) injection 250 mcg     dextrose 5% in lactated ringers infusion     diphenhydrAMINE (BENADRYL) capsule 25 mg    Or     diphenhydrAMINE (BENADRYL) injection 25 mg     hydrocortisone 2.5 % cream     HYDROmorphone (PF) (DILAUDID) injection 0.3-0.5 mg     ibuprofen (ADVIL/MOTRIN) tablet 600 mg     lactated ringers BOLUS 1,000 mL     lanolin ointment     lidocaine (LMX4) cream     lidocaine 1 % 1 mL     magnesium hydroxide (MILK OF MAGNESIA) suspension 30 mL     methylergonovine (METHERGINE) injection 200 mcg     metoclopramide (REGLAN) injection 10 mg     misoprostol (CYTOTEC) tablet 800 mcg     morphine (PF) (ASTRAMORPH /DURAMORPH) injection 150 mcg     nalbuphine (NUBAIN) injection 2.5-5 mg     nalbuphine (NUBAIN) injection 2.5-5 mg     naloxone (NARCAN) injection 0.1-0.4 mg     No MMR Needed -  Assessment: Patient does not need MMR vaccine     NO Rho (D) immune globulin (RhoGam) needed - mother Rh POSITIVE     No Tdap Needed - Assessment: Patient does not need Tdap  vaccine     ondansetron (ZOFRAN) injection 4 mg     oxyCODONE IR (ROXICODONE) tablet 5-10 mg     oxyCODONE-acetaminophen (PERCOCET) 5-325 MG per tablet 1 tablet     oxytocin (PITOCIN) 30 units in 500 mL 0.9% NaCl infusion     oxytocin (PITOCIN) 30 units in 500 mL 0.9% NaCl infusion     oxytocin (PITOCIN) injection 10 Units     prenatal multivitamin plus iron per tablet 1 tablet     prochlorperazine (COMPAZINE) injection 10 mg    Or     prochlorperazine (COMPAZINE) Suppository 25 mg     senna-docusate (SENOKOT-S;PERICOLACE) 8.6-50 MG per tablet 1 tablet    Or     senna-docusate (SENOKOT-S;PERICOLACE) 8.6-50 MG per tablet 2 tablet     simethicone (MYLICON) chewable tablet 80 mg     sodium chloride (PF) 0.9% PF flush 3 mL     sodium chloride (PF) 0.9% PF flush 3 mL     sodium phosphate (FLEET ENEMA) 1 enema     tranexamic acid (CYKLOKAPRON) Bolus 1g vial attach to NaCl 50 or 100 mL bag ADULT       A/P: POD#3 s/p RLTCS doing well.  - Anemia, asymptomatic, blood loss exacerbated by antepartum anemia. Home with oral iron  -Continue routine care.  -Anticipate discharge today.F/U 2wk with Dr. Joel. Precautions reviewed    Sabra Palomos, DO  November 22, 2018  7:51 AM
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
## 2018-11-22 NOTE — PLAN OF CARE
Pt discharged home. Discharge paperwork and instructions given. Questions answered. Pt denies further questions or concerns.

## 2018-11-22 NOTE — PLAN OF CARE
Problem: Patient Care Overview  Goal: Plan of Care/Patient Progress Review  Outcome: No Change  On pathway. Voiding without difficulty. Up, ambulating. C/o cramping. Pain well managed with PO pain meds.Breastfeeding baby on demand.
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
## 2018-11-22 NOTE — PLAN OF CARE
Problem: Postpartum ( Delivery) (Adult,Obstetrics,Pediatric)  Goal: Signs and Symptoms of Listed Potential Problems Will be Absent, Minimized or Managed (Postpartum)  Signs and symptoms of listed potential problems will be absent, minimized or managed by discharge/transition of care (reference Postpartum ( Delivery) (Adult,Obstetrics,Pediatric) CPG).   Outcome: No Change  VSS. Pain controlled well with Ibuprofen, Tylenol and Oxycodone. Breastfeeding independently. Questions answered. Will continue to monitor.

## 2018-11-24 ENCOUNTER — DOCUMENTATION ONLY (OUTPATIENT)
Dept: CARE COORDINATION | Facility: CLINIC | Age: 33
End: 2018-11-24

## 2018-11-24 NOTE — PROGRESS NOTES
[facility] will be sharing updates with you on Maternal Child Health Referral requests for home care services.  This is for care coordination purposes and alert you to referral status.  We received the referral for  Felipa Bermudez; MRN [id number] and want to update you:    [facility] has made 2 attempts to contact patient by phone and text message over the last 4 days.   We have not had any response from patient.  Final message was left advising patient to follow up with Primary Care Providers for mom and baby.    Sincerely [facility]  Cristiano Dupont  [phone]

## 2018-11-27 NOTE — DISCHARGE SUMMARY
[facility] Discharge Summary    Felipa Bermudez MRN# [id number]   Age: 33 year old YOB: 1985     Date of Admission:  2018  Date of Discharge::  2018  1:49 PM  Admitting Physician:  Jodi Joel MD  Discharge Physician:  Jodi Joel MD     Home clinic: [facility]          Admission Diagnoses:   Non-reassuring fetal heart tracing.   IUP at 38+2wga  Prior  Section Desires TOLAC  Early Latent Labor          Discharge Diagnosis:   Delivered.          Procedures:   Procedure(s): Repeat low transverse  section       -           Medications Prior to Admission:   Prenatal Vitamins          Discharge Medications:     Discharge Medication List as of 2018  9:47 AM      START taking these medications    Details   ferrous sulfate (IRON) 325 (65 Fe) MG tablet Take 1 tablet (325 mg) by mouth 2 times daily, Disp-30 tablet, R-0, Local Print      ibuprofen (ADVIL/MOTRIN) 600 MG tablet Take 1 tablet (600 mg) by mouth every 6 hours as needed for other (cramping), Disp-90 tablet, R-1, Local Print      oxyCODONE (ROXICODONE) 5 MG tablet Take 1-2 tablets (5-10 mg) by mouth every 3 hours as needed for moderate to severe pain, Disp-20 tablet, R-0, Local Print         CONTINUE these medications which have NOT CHANGED    Details   Prenatal Vit-Fe Fumarate-FA (PRENATAL VITAMIN PO) Historical      valACYclovir (VALTREX) 1000 mg tablet Take 1 tablet (1,000 mg) by mouth daily, Disp-30 tablet, R-1, E-Prescribe         STOP taking these medications       CEPHALEXIN PO Comments:   Reason for Stopping:                     Consultations:   No consultations were requested during this admission          Brief History of Labor or Admission:   Felipa presented to the [facility] with vaginal bleeding and late decelerations at term. She was counseled about the need for delivery. She desired a trial of labor after . Her fetus tolerated labor and oxytocin  however her fetal station remained high and she opted for a repeat  section.           Hospital Course:   The patient's hospital course was unremarkable.  She recovered as anticipated and experienced no post-operative complications.  On discharge, her pain was well controlled.Vaginal bleeding is similar to peak menstrual flow.  Voiding without difficulty.  Ambulating well and tolerating a normal diet.  No fever or significant wound drainage.  Breastfeeding well.  Infant is stable.  She was discharged on post-partum day #3.    Post-partum hemoglobin:   Hemoglobin   Date Value Ref Range Status   2018 9.9 (L) 11.7 - 15.7 g/dL Final             Discharge Instructions and Follow-Up:   Discharge diet: Regular   Discharge activity: Lifting restricted to 25 pounds   Discharge follow-up: Follow up with me in 2 weeks   Wound care: Keep wound clean and dry           Discharge Disposition:   Discharged to home      Jodi Joel MD

## 2018-12-04 ENCOUNTER — OFFICE VISIT (OUTPATIENT)
Dept: OBGYN | Facility: CLINIC | Age: 33
End: 2018-12-04
Payer: COMMERCIAL

## 2018-12-04 VITALS — WEIGHT: 168 LBS | SYSTOLIC BLOOD PRESSURE: 106 MMHG | DIASTOLIC BLOOD PRESSURE: 68 MMHG | BODY MASS INDEX: 27.96 KG/M2

## 2018-12-04 DIAGNOSIS — L76.82 INCISIONAL PAIN: Primary | ICD-10-CM

## 2018-12-04 PROCEDURE — 99024 POSTOP FOLLOW-UP VISIT: CPT | Performed by: OBSTETRICS & GYNECOLOGY
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
## 2018-12-04 NOTE — MR AVS SNAPSHOT
After Visit Summary   12/4/2018    Felipa Bermudez    MRN: [id number]           Patient Information     Date Of Birth          1985        Visit Information        Provider Department      12/4/2018 3:10 PM Jodi Joel MD [facility]        Today's Diagnoses     Incisional pain    -  1       Follow-ups after your visit        Your next 10 appointments already scheduled     Jan 14, 2019  1:30 PM CST   Post Partum with Jodi Joel MD   [facility] ([facility])    [address] [address]   [phone]              Who to contact     If you have questions or need follow up information about today's clinic visit or your schedule please contact [facility] directly at [phone].  Normal or non-critical lab and imaging results will be communicated to you by MyChart, letter or phone within 4 business days after the clinic has received the results. If you do not hear from us within 7 days, please contact the clinic through MyChart or phone. If you have a critical or abnormal lab result, we will notify you by phone as soon as possible.  Submit refill requests through [employer] or call your pharmacy and they will forward the refill request to us. Please allow 3 business days for your refill to be completed.          Additional Information About Your Visit        Care EveryWhere ID     This is your Care EveryWhere ID. This could be used by other organizations to access your [facility] medical records  [id number]        Your Vitals Were     Last Period Breastfeeding? BMI (Body Mass Index)             02/24/2018 Yes 27.96 kg/m2          Blood Pressure from Last 3 Encounters:   12/04/18 106/68   11/22/18 109/65   11/18/18 120/67    Weight from Last 3 Encounters:   12/04/18 168 lb (76.2 kg)   11/13/18 189 lb (85.7 kg)   11/06/18 188 lb 9.6 oz (85.5 kg)               Today, you had the following     No orders found for display       Primary Care Provider Office Phone # Fax #    Allison Karina Arredondo [phone]       [facility] [address] [place] [address]        Equal Access to Services     BRO CHAN : Hadii aad ku hadasho Soomaali, waaxda luqadaha, qaybta kaalmada adeegyada, waxpeter delarosacliffflavai nye . So [facility] [phone].    ATENCIÓN: Si habla español, tiene a jones disposición servicios gratuitos de asistencia lingüística. Llame al [phone].    We comply with applicable federal civil rights laws and [place] laws. We do not discriminate on the basis of race, color, national origin, age, disability, sex, sexual orientation, or gender identity.            Thank you!     Thank you for choosing [facility]  for your care. Our goal is always to provide you with excellent care. Hearing back from our patients is one way we can continue to improve our services. Please take a few minutes to complete the written survey that you may receive in the mail after your visit with us. Thank you!             Your Updated Medication List - Protect others around you: Learn how to safely use, store and throw away your medicines at www.disposemymeds.org.          This list is accurate as of 18  4:52 PM.  Always use your most recent med list.                   Brand Name Dispense Instructions for use Diagnosis    ferrous sulfate 325 (65 Fe) MG tablet    FEROSUL    30 tablet    Take 1 tablet (325 mg) by mouth 2 times daily    Anemia due to blood loss, acute       ibuprofen 600 MG tablet    ADVIL/MOTRIN    90 tablet    Take 1 tablet (600 mg) by mouth every 6 hours as needed for other (cramping)     delivery delivered       oxyCODONE 5 MG tablet    ROXICODONE    20 tablet    Take 1-2 tablets (5-10 mg) by mouth every 3 hours as needed for moderate to severe pain     delivery delivered        PRENATAL VITAMIN PO           valACYclovir 1000 mg tablet    VALTREX    30 tablet    Take 1 tablet (1,000 mg) by mouth daily    History of NIKA positive for herpes simplex virus

## 2019-01-14 ENCOUNTER — PRENATAL OFFICE VISIT (OUTPATIENT)
Dept: OBGYN | Facility: CLINIC | Age: 34
End: 2019-01-14
Payer: COMMERCIAL

## 2019-01-14 VITALS — WEIGHT: 166 LBS | SYSTOLIC BLOOD PRESSURE: 120 MMHG | BODY MASS INDEX: 27.62 KG/M2 | DIASTOLIC BLOOD PRESSURE: 82 MMHG

## 2019-01-14 PROBLEM — Z34.80 SUPERVISION OF NORMAL INTRAUTERINE PREGNANCY IN MULTIGRAVIDA: Status: RESOLVED | Noted: 2018-04-23 | Resolved: 2019-01-14

## 2019-01-14 PROCEDURE — 99207 ZZC POST PARTUM EXAM: CPT | Performed by: OBSTETRICS & GYNECOLOGY

## 2019-01-14 ASSESSMENT — ANXIETY QUESTIONNAIRES
3. WORRYING TOO MUCH ABOUT DIFFERENT THINGS: SEVERAL DAYS
2. NOT BEING ABLE TO STOP OR CONTROL WORRYING: SEVERAL DAYS
6. BECOMING EASILY ANNOYED OR IRRITABLE: NOT AT ALL
5. BEING SO RESTLESS THAT IT IS HARD TO SIT STILL: NOT AT ALL
IF YOU CHECKED OFF ANY PROBLEMS ON THIS QUESTIONNAIRE, HOW DIFFICULT HAVE THESE PROBLEMS MADE IT FOR YOU TO DO YOUR WORK, TAKE CARE OF THINGS AT HOME, OR GET ALONG WITH OTHER PEOPLE: SOMEWHAT DIFFICULT
7. FEELING AFRAID AS IF SOMETHING AWFUL MIGHT HAPPEN: NOT AT ALL
1. FEELING NERVOUS, ANXIOUS, OR ON EDGE: NOT AT ALL
GAD7 TOTAL SCORE: 3

## 2019-01-14 ASSESSMENT — PATIENT HEALTH QUESTIONNAIRE - PHQ9
SUM OF ALL RESPONSES TO PHQ QUESTIONS 1-9: 3
5. POOR APPETITE OR OVEREATING: SEVERAL DAYS
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
## 2019-01-15 ASSESSMENT — ANXIETY QUESTIONNAIRES: GAD7 TOTAL SCORE: 3

## 2019-01-24 ENCOUNTER — OFFICE VISIT (OUTPATIENT)
Dept: OBGYN | Facility: CLINIC | Age: 34
End: 2019-01-24
Payer: COMMERCIAL

## 2019-01-24 VITALS
SYSTOLIC BLOOD PRESSURE: 98 MMHG | DIASTOLIC BLOOD PRESSURE: 64 MMHG | BODY MASS INDEX: 28.32 KG/M2 | WEIGHT: 170.2 LBS | HEART RATE: 76 BPM | TEMPERATURE: 97.7 F

## 2019-01-24 DIAGNOSIS — L73.9 FOLLICULITIS: Primary | ICD-10-CM

## 2019-01-24 DIAGNOSIS — N64.4 BREAST PAIN, LEFT: ICD-10-CM

## 2019-01-24 PROCEDURE — 99214 OFFICE O/P EST MOD 30 MIN: CPT | Performed by: OBSTETRICS & GYNECOLOGY

## 2019-01-24 RX ORDER — MUPIROCIN 20 MG/G
OINTMENT TOPICAL 3 TIMES DAILY
Qty: 30 G | Refills: 0 | Status: SHIPPED | OUTPATIENT
Start: 2019-01-24 | End: 2019-03-26
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
## 2019-02-13 ENCOUNTER — ALLIED HEALTH/NURSE VISIT (OUTPATIENT)
Dept: LAB | Facility: CLINIC | Age: 34
End: 2019-02-13
Payer: COMMERCIAL

## 2019-02-13 DIAGNOSIS — Z23 VARICELLA VACCINATION: Primary | ICD-10-CM

## 2019-02-13 PROCEDURE — 90716 VAR VACCINE LIVE SUBQ: CPT

## 2019-02-13 PROCEDURE — 90471 IMMUNIZATION ADMIN: CPT

## 2019-03-04 ENCOUNTER — TELEPHONE (OUTPATIENT)
Dept: OBGYN | Facility: CLINIC | Age: 34
End: 2019-03-04

## 2019-03-04 NOTE — TELEPHONE ENCOUNTER
11/19/18 C/section. Pt got her cycle in January but did not get cycle in February. Pt is breast feeding. Pt informed that cycles can be irregular with breast feeding. Asked pt if she has done UPT. Pt states she has not had intercourse since delivery. Pt informed to monitor for now. No area of concern.

## 2019-03-25 NOTE — PROGRESS NOTES
SUBJECTIVE:                                                   Felipa Bermduez is a 33 year old female who presents to clinic today for the following health issue(s):  Patient presents with:  Discuss Birth Control: thinking about starting OCP    HPI:  Felipa presents to discuss contraception. She has never used any methods before. She is back with TJ and they are in counseling. The divorce proceedings are on hold for now. She is still breastfeeding Roderick.     Patient's last menstrual period was 2019..   Patient is sexually active, .  Using none for contraception.    reports that  has never smoked. she has never used smokeless tobacco.    STD testing offered?  Declined    Health maintenance updated:  yes    Today's PHQ-2 Score:   PHQ-2 (  [employer]) 10/22/2014   Q1: Little interest or pleasure in doing things 1   Q2: Feeling down, depressed or hopeless 1   PHQ-2 Score 2     Today's PHQ-9 Score:   PHQ-9 SCORE 2019   PHQ-9 Total Score 3     Today's ZORAIDA-7 Score:   ZORAIDA-7 SCORE 2019   Total Score 3       Problem list and histories reviewed & adjusted, as indicated.  Additional history: as documented.    Patient Active Problem List   Diagnosis     Tension headache 2ndary to contusion of post occiput 10-17-      Previous  delivery, antepartum     Desires  (vaginal birth after ) trial     Indication for care in labor or delivery     Bleeding     Non-reassuring electronic fetal monitoring tracing      delivery delivered     Past Surgical History:   Procedure Laterality Date     C  DELIVERY ONLY  03    , Low Cervical      SECTION N/A 2018    Procedure:  SECTION;  Surgeon: Jodi Joel MD;  Location: [facility]      Social History     Tobacco Use     Smoking status: Never Smoker     Smokeless tobacco: Never Used   Substance Use Topics     Alcohol use: No      Problem (# of Occurrences) Relation (Name,Age of  Onset)    Family History Negative (2) Mother, Father    Hypertension (1) Maternal Grandmother            Current Outpatient Medications   Medication Sig     norethindrone (MICRONOR) 0.35 MG tablet Take 1 tablet (0.35 mg) by mouth daily     No current facility-administered medications for this visit.      Allergies   Allergen Reactions     No Known Allergies        ROS:  12 point review of systems negative other than symptoms noted below.  Head: Sore Throat  Cardiovascular: Lightheadedness  Skin: Skin Dryness  Neurologic: Dizziness  Endocrine: Loss of Hair    OBJECTIVE:     /66   Wt 75.6 kg (166 lb 9.6 oz)   LMP 01/01/2019   Breastfeeding? Yes   BMI 27.72 kg/m    Body mass index is 27.72 kg/m .    Exam:  Constitutional:  Appearance: Well nourished, well developed alert, in no acute distress  Chest:  Respiratory Effort:  Breathing unlabored  Neurologic/Psychiatric:  Mental Status:  Oriented X3      In-Clinic Test Results:  No results found for this or any previous visit (from the past 24 hour(s)).    ASSESSMENT/PLAN:                                                        ICD-10-CM    1. OCP (oral contraceptive pills) initiation Z30.011 norethindrone (MICRONOR) 0.35 MG tablet       We discussed all forms of contraception. We discussed oral contraceptives as well as long acting reversible contraceptives. At this time her primary concern is not to affect breastfeeding therefore estrogen containing forms of contraception were excluded at this time. She is not interested in the progesterone implant or in the intrauterine devices. She was counseled on the side effects of micronor as well as on the need to take it at the same time everyday. She was asked to call me when she stops breastfeeding to transition her contraception.    Jodi Joel MD  [facility] [place]

## 2019-03-26 ENCOUNTER — OFFICE VISIT (OUTPATIENT)
Dept: OBGYN | Facility: CLINIC | Age: 34
End: 2019-03-26
Payer: COMMERCIAL

## 2019-03-26 VITALS — DIASTOLIC BLOOD PRESSURE: 66 MMHG | BODY MASS INDEX: 27.72 KG/M2 | SYSTOLIC BLOOD PRESSURE: 114 MMHG | WEIGHT: 166.6 LBS

## 2019-03-26 DIAGNOSIS — Z30.011 OCP (ORAL CONTRACEPTIVE PILLS) INITIATION: Primary | ICD-10-CM

## 2019-03-26 PROCEDURE — 99213 OFFICE O/P EST LOW 20 MIN: CPT | Performed by: OBSTETRICS & GYNECOLOGY

## 2019-03-26 RX ORDER — ACETAMINOPHEN AND CODEINE PHOSPHATE 120; 12 MG/5ML; MG/5ML
0.35 SOLUTION ORAL DAILY
Qty: 84 TABLET | Refills: 3 | Status: SHIPPED | OUTPATIENT
Start: 2019-03-26 | End: 2019-12-05

## 2019-07-02 ENCOUNTER — TELEPHONE (OUTPATIENT)
Dept: OBGYN | Facility: CLINIC | Age: 34
End: 2019-07-02

## 2019-07-02 RX ORDER — PRENATAL VIT/IRON FUM/FOLIC AC 27MG-0.8MG
1 TABLET ORAL DAILY
Qty: 90 TABLET | Refills: 1 | Status: SHIPPED | OUTPATIENT
Start: 2019-07-02 | End: 2019-07-15

## 2019-07-02 NOTE — TELEPHONE ENCOUNTER
Pt request PNV refill as she is still breast feeding her baby.  PNV Rx sent to preferred pharmacy per pt request    Pt verbalized understanding, in agreement with plan, and voiced no further questions.

## 2019-07-05 ENCOUNTER — TELEPHONE (OUTPATIENT)
Dept: OBGYN | Facility: CLINIC | Age: 34
End: 2019-07-05

## 2019-07-05 NOTE — TELEPHONE ENCOUNTER
"18 C/S: POSTOPERATIVE DIAGNOSES:   1.  Previous  section, declines further trial of labor after  section.   2.  Intrauterine pregnancy at 38+2 weeks gestational age.    3.  Non-reassuring fetal heart tracing with late decelerations in Obstetrics Triage in the setting of third trimester bleeding.    Pt calling wanting to speak with Dr Joel on the phone. Inquired on reason and pt preferred to not elaborate but said to tell her, \"she has questions about the baby\" and proceeded to say she will know what she is referring to.    Explained Dr Joel will return to the office Tuesday, 19 and will receive the message.  Please call patient - [phone]    No further questions or requests.     "

## 2019-07-09 NOTE — TELEPHONE ENCOUNTER
Patient called with no answer. She was asked to call me back at the office. Unclear as to the reason of her call.

## 2019-07-15 RX ORDER — PRENATAL VIT/IRON FUM/FOLIC AC 27MG-0.8MG
1 TABLET ORAL DAILY
Qty: 90 TABLET | Refills: 1 | Status: SHIPPED | OUTPATIENT
Start: 2019-07-15 | End: 2019-12-05

## 2019-07-15 NOTE — TELEPHONE ENCOUNTER
Patient calling back, states pharmacy did not receive prescription. Please send again and advise patient when sent.

## 2019-08-13 ENCOUNTER — TELEPHONE (OUTPATIENT)
Dept: OBGYN | Facility: CLINIC | Age: 34
End: 2019-08-13

## 2019-08-13 NOTE — TELEPHONE ENCOUNTER
Felipa was called and the letter she requested has been written and will be held at the  for her to  later today.

## 2019-08-13 NOTE — TELEPHONE ENCOUNTER
Pt requesting to speak to Dr. Joel. Will not explain other than to say it is in regards to her baby. Suggested pt talk to peds if has concerns. Pt states Dr. Joel will know what this about and pt needs to talk to her. She can be reached at [phone]  Routing to Dr. Joel      18 C/S: POSTOPERATIVE DIAGNOSES:   1.  Previous  section, declines further trial of labor after  section.   2.  Intrauterine pregnancy at 38+2 weeks gestational age.    3.  Non-reassuring fetal heart tracing with late decelerations in Obstetrics Triage in the setting of third trimester bleeding.

## 2019-09-03 ENCOUNTER — OFFICE VISIT (OUTPATIENT)
Dept: URGENT CARE | Facility: URGENT CARE | Age: 34
End: 2019-09-03
Payer: COMMERCIAL

## 2019-09-03 VITALS — WEIGHT: 162 LBS | BODY MASS INDEX: 26.99 KG/M2 | HEIGHT: 65 IN

## 2019-09-03 DIAGNOSIS — H92.03 ACUTE EAR PAIN, BILATERAL: ICD-10-CM

## 2019-09-03 DIAGNOSIS — H65.91 OME (OTITIS MEDIA WITH EFFUSION), RIGHT: Primary | ICD-10-CM

## 2019-09-03 DIAGNOSIS — J30.9 ALLERGIC RHINITIS, UNSPECIFIED SEASONALITY, UNSPECIFIED TRIGGER: ICD-10-CM

## 2019-09-03 PROCEDURE — 99203 OFFICE O/P NEW LOW 30 MIN: CPT | Performed by: PHYSICIAN ASSISTANT

## 2019-09-03 RX ORDER — IBUPROFEN 800 MG/1
800 TABLET, FILM COATED ORAL EVERY 8 HOURS PRN
Qty: 30 TABLET | Refills: 0 | Status: SHIPPED | OUTPATIENT
Start: 2019-09-03 | End: 2019-12-05

## 2019-09-03 RX ORDER — AMOXICILLIN 875 MG
875 TABLET ORAL 2 TIMES DAILY
Qty: 20 TABLET | Refills: 0 | Status: SHIPPED | OUTPATIENT
Start: 2019-09-03 | End: 2019-12-05

## 2019-09-03 RX ORDER — FLUTICASONE PROPIONATE 50 MCG
2 SPRAY, SUSPENSION (ML) NASAL DAILY
Qty: 16 G | Refills: 3 | Status: SHIPPED | OUTPATIENT
Start: 2019-09-03 | End: 2019-12-05

## 2019-09-03 ASSESSMENT — MIFFLIN-ST. JEOR: SCORE: 1430.71

## 2019-09-03 ASSESSMENT — PAIN SCALES - GENERAL: PAINLEVEL: MODERATE PAIN (5)

## 2019-09-03 NOTE — PROGRESS NOTES
Patient presents with:  Urgent Care  Ear Problem: Pain in right ear x 1d Pain in left ear x today  Cold sx x 4d (runny nose, congestion, cough)    Sneezing  Right ear pain    Child was ill recently   Here with baby    SUBJECTIVE:   Felipa Bermudez is a 34 year old female presenting with a chief complaint of:  1) right ear pain for 1 day  Left ear pain since this morning.    No ear trauma or drainage.    2) cold symptoms with runny nose and congestion for about a week.    No fevers  Onset of symptoms was as above.  Course of illness is worsening.    Severity moderate  Current and Associated symptoms: as above.  Predisposing factors include None.    FH:   Denies any significant FH    Past Medical History:   Diagnosis Date     Herpes      Patient Active Problem List   Diagnosis     Tension headache 2ndary to contusion of post occiput 10-17-      Previous  delivery, antepartum     Desires  (vaginal birth after ) trial     Indication for care in labor or delivery     Bleeding     Non-reassuring electronic fetal monitoring tracing      delivery delivered     Social History     Tobacco Use     Smoking status: Never Smoker     Smokeless tobacco: Never Used   Substance Use Topics     Alcohol use: No       ROS:  CONSTITUTIONAL:NEGATIVE for fever, chills, change in weight  INTEGUMENTARY/SKIN: NEGATIVE for worrisome rashes, moles or lesions  EYES: NEGATIVE for vision changes or irritation  ENT/MOUTH: as per HPI  RESP:NEGATIVE for significant cough or SOB  CV: NEGATIVE for chest pain, palpitations or peripheral edema  GI: NEGATIVE for nausea, abdominal pain, heartburn, or change in bowel habits  MUSCULOSKELETAL: NEGATIVE for significant arthralgias or myalgia  Review of systems negative except as stated above.    OBJECTIVE  :BP (P) 112/58 (BP Location: Right arm, Patient Position: Sitting, Cuff Size: Adult Regular)   Pulse (P) 98   Temp (P) 97.6  F (36.4  C) (Oral)   Resp (P) 14   Ht 1.651 m  "(5' 5\")   Wt 73.5 kg (162 lb)   LMP  (LMP Unknown)   SpO2 (P) 96%   Breastfeeding? Yes   BMI 26.96 kg/m       GENERAL APPEARANCE: healthy, alert and no distress  EYES: EOMI,  PERRL, conjunctiva clear  HENT: ear canals and TM's normal.  Nose and mouth without ulcers, erythema or lesions  HENT: TM erythematous right, TM congested/bulging right, nasal turbinates boggy with bluish hue and rhinorrhea clear  NECK: supple, nontender, no lymphadenopathy  RESP: lungs clear to auscultation - no rales, rhonchi or wheezes  CV: regular rates and rhythm, normal S1 S2, no murmur noted  ABDOMEN:  soft, nontender, no HSM or masses and bowel sounds normal  NEURO: Normal strength and tone, sensory exam grossly normal,  normal speech and mentation  SKIN: no suspicious lesions or rashes    (H65.91) OME (otitis media with effusion), right  (primary encounter diagnosis)  Comment:   Plan: amoxicillin (AMOXIL) 875 MG tablet            (J30.9) Allergic rhinitis, unspecified seasonality, unspecified trigger  Comment:   Plan: fluticasone (FLONASE) 50 MCG/ACT nasal spray        2 sprays each nostril at bedtime for minimum of 2 weeks    (H92.03) Acute ear pain, bilateral  Comment:   Plan: ibuprofen (ADVIL/MOTRIN) 800 MG tablet              "

## 2019-09-03 NOTE — PATIENT INSTRUCTIONS
(H65.91) OME (otitis media with effusion), right  (primary encounter diagnosis)  Comment:   Plan: amoxicillin (AMOXIL) 875 MG tablet            (J30.9) Allergic rhinitis, unspecified seasonality, unspecified trigger  Comment:   Plan: fluticasone (FLONASE) 50 MCG/ACT nasal spray        2 sprays each nostril at bedtime for minimum of 2 weeks    (H92.03) Acute ear pain, bilateral  Comment:   Plan: ibuprofen (ADVIL/MOTRIN) 800 MG tablet

## 2019-09-10 ENCOUNTER — NURSE TRIAGE (OUTPATIENT)
Dept: NURSING | Facility: CLINIC | Age: 34
End: 2019-09-10

## 2019-10-23 ENCOUNTER — OFFICE VISIT (OUTPATIENT)
Dept: URGENT CARE | Facility: URGENT CARE | Age: 34
End: 2019-10-23
Payer: COMMERCIAL

## 2019-10-23 VITALS
OXYGEN SATURATION: 100 % | HEART RATE: 66 BPM | DIASTOLIC BLOOD PRESSURE: 60 MMHG | SYSTOLIC BLOOD PRESSURE: 114 MMHG | RESPIRATION RATE: 16 BRPM | BODY MASS INDEX: 26.96 KG/M2 | WEIGHT: 162 LBS

## 2019-10-23 DIAGNOSIS — L03.031 PARONYCHIA OF TOE, RIGHT: Primary | ICD-10-CM

## 2019-10-23 PROCEDURE — 87070 CULTURE OTHR SPECIMN AEROBIC: CPT | Performed by: FAMILY MEDICINE

## 2019-10-23 PROCEDURE — 99213 OFFICE O/P EST LOW 20 MIN: CPT | Mod: 25 | Performed by: FAMILY MEDICINE

## 2019-10-23 PROCEDURE — 87186 SC STD MICRODIL/AGAR DIL: CPT | Performed by: FAMILY MEDICINE

## 2019-10-23 PROCEDURE — 10060 I&D ABSCESS SIMPLE/SINGLE: CPT | Performed by: FAMILY MEDICINE

## 2019-10-23 PROCEDURE — 87077 CULTURE AEROBIC IDENTIFY: CPT | Performed by: FAMILY MEDICINE

## 2019-10-23 RX ORDER — SULFAMETHOXAZOLE/TRIMETHOPRIM 800-160 MG
1 TABLET ORAL 2 TIMES DAILY
Qty: 10 TABLET | Refills: 0 | Status: SHIPPED | OUTPATIENT
Start: 2019-10-23 | End: 2019-12-05

## 2019-10-24 NOTE — PROGRESS NOTES
SUBJECTIVE:  Chief Complaint   Patient presents with     Toenail     L big toe is in pain since last night.      Felipa Bermudez is a 34 year old female who presents with a chief complaint of pain, redness and swelling along the outer edge of her right great toenail. She reports a book dropped on it 2 days ago.  Symptoms began yesterday.  Context: she sqeezed it today and pus came out    Pain exacerbated by pressure Relieved by nothing.  She treated it initially with nothing. This is the first time this type of injury has occurred to this patient.     Past Medical History:   Diagnosis Date     Herpes      Current Outpatient Medications   Medication Sig Dispense Refill     ibuprofen (ADVIL/MOTRIN) 800 MG tablet Take 1 tablet (800 mg) by mouth every 8 hours as needed 30 tablet 0     Prenatal Vit-Fe Fumarate-FA (PRENATAL MULTIVITAMIN W/IRON) 27-0.8 MG tablet Take 1 tablet by mouth daily 90 tablet 1     fluticasone (FLONASE) 50 MCG/ACT nasal spray Spray 2 sprays into both nostrils daily (Patient not taking: Reported on 10/23/2019) 16 g 3     norethindrone (MICRONOR) 0.35 MG tablet Take 1 tablet (0.35 mg) by mouth daily (Patient not taking: Reported on 9/3/2019) 84 tablet 3     Social History     Tobacco Use     Smoking status: Never Smoker     Smokeless tobacco: Never Used   Substance Use Topics     Alcohol use: No       ROS:  CONSTITUTIONAL:NEGATIVE for fever, chills, change in weight  INTEGUMENTARY/SKIN: as above  RESP:NEGATIVE for significant cough or SOB  CV: NEGATIVE for chest pain, palpitations or peripheral edema  HEME/ALLERGY/IMMUNE: NEGATIVE for swollen nodes    EXAM:   /60 (BP Location: Right arm, Patient Position: Sitting, Cuff Size: Adult Regular)   Pulse 66   Resp 16   Wt 73.5 kg (162 lb)   SpO2 100%   BMI 26.96 kg/m     General: the patient appears in no apparent distress  No lymphadenopathy  CV: normal and regular  Lungs clear  Right foot: there is erythema, edema along the proximal edge of  the great toe nail, with an area of discoloration. This was prepped with alcohol and the area incised with a #11 blade. Purulent material spontaneously drained from the wound. The patient report improvement in her symptoms. The purulent material was cultured.      ASSESSMENT:  Paronychia of right great toe.    PLAN:  Bactrim DS one twice daily for 5 days

## 2019-10-27 LAB
BACTERIA SPEC CULT: ABNORMAL
Lab: ABNORMAL
SPECIMEN SOURCE: ABNORMAL

## 2019-11-05 ENCOUNTER — HEALTH MAINTENANCE LETTER (OUTPATIENT)
Age: 34
End: 2019-11-05

## 2019-12-04 NOTE — PROGRESS NOTES
SUBJECTIVE:                                                   Felipa Bermudez is a 34 year old female who presents to clinic today for the following health issue(s):  Patient presents with:  Vaginal Problem: here for possible vaginal infection-has itching.       HPI:  Pt here today with c/o vaginal itching for 3 days. Has not self treated. Denies discharge and odor. No fever/chills.     Requesting full STD testing as she and her  had a period of time where they were .     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    Patient's last menstrual period was 2019 (approximate)..     Patient is sexually active, .  Using none for contraception.    reports that she has never smoked. She has never used smokeless tobacco.    STD testing offered?  Accepted    Health maintenance updated:  yes    Today's PHQ-2 Score:   PHQ-2 (  [employer]) 2019   Q1: Little interest or pleasure in doing things 0   Q2: Feeling down, depressed or hopeless 0   PHQ-2 Score 0     Today's PHQ-9 Score:   PHQ-9 SCORE 2019   PHQ-9 Total Score 3     Today's ZORAIDA-7 Score:   ZORAIDA-7 SCORE 2019   Total Score 3       Problem list and histories reviewed & adjusted, as indicated.  Additional history: as documented.    Patient Active Problem List   Diagnosis     Tension headache 2ndary to contusion of post occiput 10-17-14      HSV-2 infection     Past Surgical History:   Procedure Laterality Date     C  DELIVERY ONLY  03    , Low Cervical      SECTION N/A 2018    Procedure:  SECTION;  Surgeon: Jodi Joel MD;  Location: [facility]      Social History     Tobacco Use     Smoking status: Never Smoker     Smokeless tobacco: Never Used   Substance Use Topics     Alcohol use: No      Problem (# of Occurrences) Relation (Name,Age of Onset)    Family History Negative (2) Mother, Father    Hypertension (1) Maternal Grandmother            No current outpatient medications on file.  "    No current facility-administered medications for this visit.      Allergies   Allergen Reactions     No Known Allergies        ROS:  12 point review of systems negative other than symptoms noted below or in the HPI.  Genitourinary: Vaginal Itching  No urinary frequency or dysuria, bladder or kidney problems, Normal menstrual cycles, POSITIVE for:, vaginal itching or burning      OBJECTIVE:     /64   Pulse 68   Ht 1.651 m (5' 5\")   Wt 72.6 kg (160 lb)   LMP 11/21/2019 (Approximate)   BMI 26.63 kg/m    Body mass index is 26.63 kg/m .    Exam:  Constitutional:  Appearance: Well nourished, well developed alert, in no acute distress  Psychiatric:  Mentation appears normal and affect normal/bright.  Pelvic Exam:  External Genitalia:     Normal appearance for age, no discharge present, no tenderness present, no inflammatory lesions present, color normal  Vagina:     Normal vaginal vault without central or paravaginal defects, thick creamy discharge present, no inflammatory lesions present, no masses present  Bladder:     Nontender to palpation  Urethra:   Urethral Body:  Urethra palpation normal, urethra structural support normal   Urethral Meatus:  No erythema or lesions present  Cervix:     Appearance healthy, no lesions present, nontender to palpation, no bleeding present  Uterus:     Uterus: firm, normal sized and nontender, midplane in position.   Adnexa:     No adnexal tenderness present, no adnexal masses present  Perineum:     Perineum within normal limits, no evidence of trauma, no rashes or skin lesions present  Anus:     Anus within normal limits, no hemorrhoids present  Inguinal Lymph Nodes:     No lymphadenopathy present  Pubic Hair:     Normal pubic hair distribution for age  Genitalia and Groin:     No rashes present, no lesions present, no areas of discoloration, no masses present       In-Clinic Test Results:  No results found for this or any previous visit (from the past 24 " hour(s)).    ASSESSMENT/PLAN:                                                        ICD-10-CM    1. Itching of vagina N89.8 Wet prep   2. Screen for STD (sexually transmitted disease) Z11.3 Treponema Abs w Reflex to RPR and Titer     NEISSERIA GONORRHOEA PCR     Herpes Simplex Virus 1 and 2 IgG   3. Screening for chlamydial disease Z11.8 CHLAMYDIA TRACHOMATIS PCR   4. Screening for HIV (human immunodeficiency virus) Z11.4 HIV Antigen Antibody Combo       There are no Patient Instructions on file for this visit.    Wet prep: + yeast and clue cells. Will treat. No IC x1 week  Will notify of STD testing when available. Warm bath soaks.     OSWALDO Garvin [facility] FOR [facility]

## 2019-12-05 ENCOUNTER — OFFICE VISIT (OUTPATIENT)
Dept: OBGYN | Facility: CLINIC | Age: 34
End: 2019-12-05
Payer: COMMERCIAL

## 2019-12-05 VITALS
SYSTOLIC BLOOD PRESSURE: 110 MMHG | WEIGHT: 160 LBS | BODY MASS INDEX: 26.66 KG/M2 | HEART RATE: 68 BPM | HEIGHT: 65 IN | DIASTOLIC BLOOD PRESSURE: 64 MMHG

## 2019-12-05 DIAGNOSIS — Z11.4 SCREENING FOR HIV (HUMAN IMMUNODEFICIENCY VIRUS): ICD-10-CM

## 2019-12-05 DIAGNOSIS — N76.0 BV (BACTERIAL VAGINOSIS): ICD-10-CM

## 2019-12-05 DIAGNOSIS — Z11.8 SCREENING FOR CHLAMYDIAL DISEASE: ICD-10-CM

## 2019-12-05 DIAGNOSIS — B96.89 BV (BACTERIAL VAGINOSIS): ICD-10-CM

## 2019-12-05 DIAGNOSIS — N89.8 ITCHING OF VAGINA: Primary | ICD-10-CM

## 2019-12-05 DIAGNOSIS — Z11.3 SCREEN FOR STD (SEXUALLY TRANSMITTED DISEASE): ICD-10-CM

## 2019-12-05 PROBLEM — B00.9 HSV-2 INFECTION: Status: ACTIVE | Noted: 2019-12-05

## 2019-12-05 PROBLEM — R58 BLEEDING: Status: RESOLVED | Noted: 2018-11-19 | Resolved: 2019-12-05

## 2019-12-05 PROBLEM — O34.219 DESIRES VBAC (VAGINAL BIRTH AFTER CESAREAN) TRIAL: Status: RESOLVED | Noted: 2018-10-08 | Resolved: 2019-12-05

## 2019-12-05 PROBLEM — O36.8390 NON-REASSURING ELECTRONIC FETAL MONITORING TRACING: Status: RESOLVED | Noted: 2018-11-19 | Resolved: 2019-12-05

## 2019-12-05 PROBLEM — O34.219 PREVIOUS CESAREAN DELIVERY, ANTEPARTUM: Status: RESOLVED | Noted: 2018-07-17 | Resolved: 2019-12-05

## 2019-12-05 LAB
SPECIMEN SOURCE: ABNORMAL
WET PREP SPEC: ABNORMAL

## 2019-12-05 PROCEDURE — 87210 SMEAR WET MOUNT SALINE/INK: CPT | Performed by: NURSE PRACTITIONER

## 2019-12-05 PROCEDURE — 86780 TREPONEMA PALLIDUM: CPT | Performed by: NURSE PRACTITIONER

## 2019-12-05 PROCEDURE — 86696 HERPES SIMPLEX TYPE 2 TEST: CPT | Performed by: NURSE PRACTITIONER

## 2019-12-05 PROCEDURE — 87389 HIV-1 AG W/HIV-1&-2 AB AG IA: CPT | Performed by: NURSE PRACTITIONER

## 2019-12-05 PROCEDURE — 86695 HERPES SIMPLEX TYPE 1 TEST: CPT | Performed by: NURSE PRACTITIONER

## 2019-12-05 PROCEDURE — 36415 COLL VENOUS BLD VENIPUNCTURE: CPT | Performed by: NURSE PRACTITIONER

## 2019-12-05 PROCEDURE — 99213 OFFICE O/P EST LOW 20 MIN: CPT | Performed by: NURSE PRACTITIONER

## 2019-12-05 PROCEDURE — 87491 CHLMYD TRACH DNA AMP PROBE: CPT | Performed by: NURSE PRACTITIONER

## 2019-12-05 PROCEDURE — 87591 N.GONORRHOEAE DNA AMP PROB: CPT | Performed by: NURSE PRACTITIONER

## 2019-12-05 RX ORDER — METRONIDAZOLE 500 MG/1
500 TABLET ORAL 2 TIMES DAILY
Qty: 14 TABLET | Refills: 0 | Status: SHIPPED | OUTPATIENT
Start: 2019-12-05 | End: 2020-08-26

## 2019-12-05 RX ORDER — FLUCONAZOLE 150 MG/1
150 TABLET ORAL ONCE
Qty: 1 TABLET | Refills: 0 | Status: SHIPPED | OUTPATIENT
Start: 2019-12-05 | End: 2019-12-05

## 2019-12-05 ASSESSMENT — MIFFLIN-ST. JEOR: SCORE: 1421.64

## 2019-12-06 LAB
C TRACH DNA SPEC QL NAA+PROBE: NEGATIVE
HIV 1+2 AB+HIV1 P24 AG SERPL QL IA: NONREACTIVE
HSV1 IGG SERPL QL IA: 7.2 AI (ref 0–0.8)
HSV2 IGG SERPL QL IA: <0.2 AI (ref 0–0.8)
N GONORRHOEA DNA SPEC QL NAA+PROBE: NEGATIVE
SPECIMEN SOURCE: NORMAL
SPECIMEN SOURCE: NORMAL
T PALLIDUM AB SER QL: NONREACTIVE

## 2019-12-11 ENCOUNTER — TELEPHONE (OUTPATIENT)
Dept: OBGYN | Facility: CLINIC | Age: 34
End: 2019-12-11

## 2019-12-11 NOTE — TELEPHONE ENCOUNTER
Patient informed of results.  Pt verbalized understanding, in agreement with plan, and voiced no further questions.  Nava Oropeza RN on 12/11/2019 at 12:13 PM

## 2020-08-25 ENCOUNTER — NURSE TRIAGE (OUTPATIENT)
Dept: NURSING | Facility: CLINIC | Age: 35
End: 2020-08-25

## 2020-08-25 NOTE — TELEPHONE ENCOUNTER
Caller called regarding lower back pain that is radiating to bilateral legs.  States that she is having tingling sensation in bilateral legs.  Caller states she has taken ibuprofen with a little effect  Caller reported lifting an heavy object yesterday.  Chichi Kolb RN  COVID 19 Nurse Triage Plan/Patient Instructions    Please be aware that novel coronavirus (COVID-19) may be circulating in the community. If you develop symptoms such as fever, cough, or SOB or if you have concerns about the presence of another infection including coronavirus (COVID-19), please contact your health care provider or visit [email or web address].     Disposition/Instructions    In-Person Visit with provider recommended. Reference Visit Selection Guide.    Thank you for taking steps to prevent the spread of this virus.  o Limit your contact with others.  o Wear a simple mask to cover your cough.  o Wash your hands well and often.    Resources    [facility]: About COVID-19: [email or web address]    CDC: What to Do If You're Sick: www.cdc.gov/coronavirus/2019-ncov/about/steps-when-sick.html    CDC: Ending Home Isolation: www.cdc.gov/coronavirus/2019-ncov/hcp/disposition-in-home-patients.html     CDC: Caring for Someone: www.cdc.gov/coronavirus/2019-ncov/if-you-are-sick/care-for-someone.html     [facility]: Interim Guidance for Hospital Discharge to Home: www.health.[facility].[place]/diseases/coronavirus/hcp/hospdischarge.pdf    [facility] clinical trials (COVID-19 research studies): [email or web address]-clinical-trials     Below are the COVID-19 hotlines at the [place] Department of [facility] ([facility]). Interpreters are available.   o For health questions: Call 335-639-2408 or 1-132.278.2303 (7 a.m. to 7 p.m.)  o For questions about schools and childcare: Call 606-950-8969 or 1-353.364.1757 (7 a.m. to 7 p.m.)                   Additional Information    Negative: Passed out (i.e., fainted, collapsed and was not responding)     Negative: Shock suspected (e.g., cold/pale/clammy skin, too weak to stand, low BP, rapid pulse)    Negative: Sounds like a life-threatening emergency to the triager    Negative: Major injury to the back (e.g., MVA, fall > 10 feet or 3 meters, penetrating injury, etc.)    Negative: Pain in the upper back over the ribs (rib cage) that radiates (travels) into the chest    Negative: Pain in the upper back over the ribs (rib cage) and worsened by coughing (or clearly increases with breathing)    Negative: SEVERE back pain of sudden onset and age > 60    Negative: SEVERE abdominal pain (e.g., excruciating)    Negative: Abdominal pain and age > 60    Negative: Unable to urinate (or only a few drops) and bladder feels very full    Negative: Loss of bladder or bowel control (urine or bowel incontinence; wetting self, leaking stool) of new onset    Negative: Numbness (loss of sensation) in groin or rectal area    Negative: Pain radiates into groin, scrotum    Negative: Blood in urine (red, pink, or tea-colored)    Negative: Vomiting and pain over lower ribs of back (i.e., flank - kidney area)    Negative: Weakness of a leg or foot (e.g., unable to bear weight, dragging foot)    Negative: Patient sounds very sick or weak to the triager    Negative: Fever > 100.5 F (38.1 C) and flank pain    Negative: Pain or burning with passing urine (urination)    Negative: SEVERE back pain (e.g., excruciating, unable to do any normal activities) and not improved after pain medicine and CARE ADVICE    Negative: Numbness in an arm or hand (i.e., loss of sensation) and upper back pain    Negative: Numbness in a leg or foot (i.e., loss of sensation)    Negative: High-risk adult (e.g., history of cancer, history of HIV, or history of IV drug abuse)    Negative: Painful rash with multiple small blisters grouped together (i.e., dermatomal distribution or 'band' or 'stripe')    Pain radiates into the thigh or further down the leg, and in both  legs    Protocols used: BACK PAIN-A-OH

## 2020-08-26 ENCOUNTER — OFFICE VISIT (OUTPATIENT)
Dept: URGENT CARE | Facility: URGENT CARE | Age: 35
End: 2020-08-26
Payer: COMMERCIAL

## 2020-08-26 VITALS
OXYGEN SATURATION: 98 % | TEMPERATURE: 98.3 F | HEART RATE: 98 BPM | RESPIRATION RATE: 18 BRPM | SYSTOLIC BLOOD PRESSURE: 124 MMHG | DIASTOLIC BLOOD PRESSURE: 64 MMHG

## 2020-08-26 DIAGNOSIS — S39.012A STRAIN OF LUMBAR REGION, INITIAL ENCOUNTER: Primary | ICD-10-CM

## 2020-08-26 PROCEDURE — 99214 OFFICE O/P EST MOD 30 MIN: CPT | Performed by: PHYSICIAN ASSISTANT

## 2020-08-26 RX ORDER — IBUPROFEN 800 MG/1
800 TABLET, FILM COATED ORAL EVERY 8 HOURS PRN
Qty: 50 TABLET | Refills: 0 | Status: SHIPPED | OUTPATIENT
Start: 2020-08-26 | End: 2021-12-01

## 2020-08-26 RX ORDER — TIZANIDINE 2 MG/1
2 TABLET ORAL 3 TIMES DAILY
Qty: 25 TABLET | Refills: 0 | Status: SHIPPED | OUTPATIENT
Start: 2020-08-26 | End: 2021-12-01

## 2020-08-26 ASSESSMENT — ENCOUNTER SYMPTOMS: BACK PAIN: 1

## 2020-08-26 NOTE — PROGRESS NOTES
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 Last night lips went numb and had chills       She is an established patient of [place].Patient presents with back pain since yesterday.  Noted pain noted at 2 am.  Pain woke her up.  Patient states she was lifting and moving piles books onto a pallet -- patient estimates that they weighed 25 lbs.  ( 5 books in a pile, 4,000 books altogether).  No prior back problem.  Patient took 2 ibuprofen total and took a hot bath.  No dysuria, no BM problems, no fevers.      Patient reported to [employer].  Patient reported yesterday at 4 am.  Patient missed work yesterday and today.  Pain goes up her back and legs feel shaky.  Pain is constant and is a 7/10.  No prior history of back pain.      PMHx:  None; Medications:  None; Allergies:  None; Surgeries:  Csection x 2; Social:  none      Review of Systems   Musculoskeletal: Positive for back pain.       Past Medical History:   Diagnosis Date     Herpes      Family History   Problem Relation Age of Onset     Hypertension Maternal Grandmother      Family History Negative Mother      Family History Negative Father      Current Outpatient Medications   Medication Sig Dispense Refill     ibuprofen (ADVIL/MOTRIN) 800 MG tablet Take 1 tablet (800 mg) by mouth every 8 hours as needed for moderate pain 50 tablet 0     tiZANidine (ZANAFLEX) 2 MG tablet Take 1 tablet (2 mg) by mouth 3 times daily 25 tablet 0     Social History     Tobacco Use     Smoking status: Never Smoker     Smokeless tobacco: Never Used   Substance Use Topics     Alcohol use: No       OBJECTIVE  /64   Pulse 98   Temp 98.3  F (36.8  C) (Tympanic)   Resp 18   LMP 08/05/2020 (Approximate)   SpO2 98%     Physical Exam  Vitals signs and nursing note reviewed.   Constitutional:        Appearance: Normal appearance. She is obese.   Musculoskeletal:      Comments: Patient able to ambulate, slowly; she can heel walk, toe walk.  Skin over the back normal.  Generalized tenderness to palpation at the lumbar muscles bilaterally equal.  No PSIS tenderness.  Strength in LE normal.  Negative SLR.  Hip and knee exam unremarkable.  Distal extremities warm and dry.  No bony tenderness on the spine.   Skin:     General: Skin is warm and dry.   Neurological:      General: No focal deficit present.      Mental Status: She is alert.   Psychiatric:         Mood and Affect: Mood normal.         Labs:  No results found for this or any previous visit (from the past 24 hour(s)).    X-Ray was not done.    ASSESSMENT:      ICD-10-CM    1. Strain of lumbar region, initial encounter  S39.012A ibuprofen (ADVIL/MOTRIN) 800 MG tablet     tiZANidine (ZANAFLEX) 2 MG tablet     PHYSICAL THERAPY REFERRAL        Medical Decision Making:    Differential Diagnosis:  MS Injury Pain: sprain and muscle strain    Serious Comorbid Conditions:  Adult:  None    PLAN:    MS Injury/Pain  Rx for ibuprofen 800 TID with zanaflex, 2 mg q 8  Prn.  Rx for PT.  Note to return to work in one week.  Discussed that she will need to see PCP if pain continues for further management.      Followup:    If not improving or if condition worsens, follow up with your Primary Care Provider    Patient Instructions       Patient Education     Understanding Lumbosacral Strain    Lumbosacral strain is a medical term for an injury that causes low back pain. The lumbosacral area (low back) is between the bottom of the ribcage and the top of the buttocks. A strain is tearing of muscles and tendons. These tears can be very small but still cause pain.  How a lumbosacral strain happens  Muscles and tendons connected to the spine can be strained in a number of ways:    Sitting or standing in the same position for long periods of time. This can harm the low back  over time. Poor posture can make low back pain more likely.    Moving the muscles and tendons past their usual range of motion. This can cause a sudden injury. This can happen when you twist, bend over, or lift something heavy. Not using correct technique for sports or tasks like lifting can make back injury more likely.    Accidents or falls  Lumbosacral strain can be caused by other problems, but these are less common.  Symptoms of lumbosacral strain  Symptoms may include:    Pain in the back, often on one side    Pain that gets worse with movement and gets better with rest    Inability to move as freely as usual    Swelling, slight redness, and skin warmth in the painful area  Treatment for lumbosacral strain  Low back pain often goes away by itself within several weeks. But it often comes back. Treatment focuses on reducing pain and avoiding further injury. Bed rest is usually not recommended for low back pain. Treatments may include:    Avoiding or changing the action that caused the problem. This helps prevent injuring the tissues again.    Prescription or over-the-counter pain medicines. These help reduce inflammation, swelling, and pain.    Cold or heat packs. These help reduce pain and swelling.    Stretching and other exercises. These improve flexibility and strength.    Physical therapy. This usually includes exercises and other treatments.    Injections of medicine. This may relieve symptoms.  If these treatments do not relieve symptoms, your healthcare provider may order imaging tests to learn more about the problem. Sometimes you may need surgery.  Possible complications of lumbosacral strain  If the cause of the pain is not addressed, symptoms may return or get worse. Follow your healthcare provider s instructions on lifestyle changes and treating your back.     When to call your healthcare provider  Call your healthcare provider right away if you have any of these:    Fever of 100.4 F (38 C) or  higher, or as directed    Numbness, tingling, or weakness    Problems with bowel or bladder control, or problems having sex    Pain that does not go away, or gets worse    New symptoms   Date Last Reviewed: 3/10/2016    [id number] [employer]. [address] [place] [address], [place] [address]. All rights reserved. This information is not intended as a substitute for professional medical care. Always follow your healthcare professional's instructions.

## 2020-08-26 NOTE — PATIENT INSTRUCTIONS
Patient Education     Understanding Lumbosacral Strain    Lumbosacral strain is a medical term for an injury that causes low back pain. The lumbosacral area (low back) is between the bottom of the ribcage and the top of the buttocks. A strain is tearing of muscles and tendons. These tears can be very small but still cause pain.  How a lumbosacral strain happens  Muscles and tendons connected to the spine can be strained in a number of ways:    Sitting or standing in the same position for long periods of time. This can harm the low back over time. Poor posture can make low back pain more likely.    Moving the muscles and tendons past their usual range of motion. This can cause a sudden injury. This can happen when you twist, bend over, or lift something heavy. Not using correct technique for sports or tasks like lifting can make back injury more likely.    Accidents or falls  Lumbosacral strain can be caused by other problems, but these are less common.  Symptoms of lumbosacral strain  Symptoms may include:    Pain in the back, often on one side    Pain that gets worse with movement and gets better with rest    Inability to move as freely as usual    Swelling, slight redness, and skin warmth in the painful area  Treatment for lumbosacral strain  Low back pain often goes away by itself within several weeks. But it often comes back. Treatment focuses on reducing pain and avoiding further injury. Bed rest is usually not recommended for low back pain. Treatments may include:    Avoiding or changing the action that caused the problem. This helps prevent injuring the tissues again.    Prescription or over-the-counter pain medicines. These help reduce inflammation, swelling, and pain.    Cold or heat packs. These help reduce pain and swelling.    Stretching and other exercises. These improve flexibility and strength.    Physical therapy. This usually includes exercises and other treatments.    Injections of medicine. This  may relieve symptoms.  If these treatments do not relieve symptoms, your healthcare provider may order imaging tests to learn more about the problem. Sometimes you may need surgery.  Possible complications of lumbosacral strain  If the cause of the pain is not addressed, symptoms may return or get worse. Follow your healthcare provider s instructions on lifestyle changes and treating your back.     When to call your healthcare provider  Call your healthcare provider right away if you have any of these:    Fever of 100.4 F (38 C) or higher, or as directed    Numbness, tingling, or weakness    Problems with bowel or bladder control, or problems having sex    Pain that does not go away, or gets worse    New symptoms   Date Last Reviewed: 3/10/2016    2248-6027 [employer]. [address] [place] [address], [place] [address]. All rights reserved. This information is not intended as a substitute for professional medical care. Always follow your healthcare professional's instructions.

## 2020-08-26 NOTE — LETTER
August 26, 2020      Felipa Bermudez  [address] [facility] [address]        To Whom It May Concern:    Felipa Bermudez was seen in our clinic.  Patient may return to work on 9/2/20.      Sincerely,        Tootie Carter PA-C

## 2020-09-04 ENCOUNTER — OFFICE VISIT (OUTPATIENT)
Dept: FAMILY MEDICINE | Facility: CLINIC | Age: 35
End: 2020-09-04
Payer: COMMERCIAL

## 2020-09-04 VITALS
HEART RATE: 76 BPM | OXYGEN SATURATION: 100 % | SYSTOLIC BLOOD PRESSURE: 103 MMHG | HEIGHT: 65 IN | DIASTOLIC BLOOD PRESSURE: 68 MMHG | RESPIRATION RATE: 14 BRPM | WEIGHT: 171.3 LBS | BODY MASS INDEX: 28.54 KG/M2

## 2020-09-04 DIAGNOSIS — M54.42 ACUTE BILATERAL LOW BACK PAIN WITH LEFT-SIDED SCIATICA: Primary | ICD-10-CM

## 2020-09-04 PROCEDURE — 99214 OFFICE O/P EST MOD 30 MIN: CPT | Performed by: FAMILY MEDICINE

## 2020-09-04 RX ORDER — CYCLOBENZAPRINE HCL 5 MG
5 TABLET ORAL 3 TIMES DAILY PRN
Qty: 30 TABLET | Refills: 0 | Status: SHIPPED | OUTPATIENT
Start: 2020-09-04 | End: 2020-12-04

## 2020-09-04 ASSESSMENT — MIFFLIN-ST. JEOR: SCORE: 1467.89

## 2020-09-04 ASSESSMENT — PAIN SCALES - GENERAL: PAINLEVEL: SEVERE PAIN (6)

## 2020-09-04 NOTE — LETTER
[facility]  [address]  [address]  [place], [place] [address]  [phone]    September 4, 2020    RE:  Felipa Bermudez                                                                                                                                                       [address] [facility] [address]            To whom it may concern:    Felipa Bermudez is under my professional care and she needs to be off work because of low back pain .        Sincerely,        Tara Underwood MD      Clinic hours:  Monday 7:30 AM - 5:00 PM    Tuesday  7:00 AM - 7:00 PM    Wednesday  7:00 AM - 5:00 PM    Thursday  7:30 AM -  7:00 PM    Friday   7:30 AM -  5:00 PM

## 2020-09-04 NOTE — PROGRESS NOTES
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  Onset/Duration: 2 weeks  Description:   Location of pain: low back bilateral  Character of pain: sharp/pressure  Pain radiation: none and up the back  New numbness or weakness in legs, not attributed to pain: no   Intensity: Currently 6/10  Progression of Symptoms: worsening  History:   Specific cause: lifting, work-related  Pain interferes with job: YES-   History of back problems: no prior back problems  Any previous MRI or X-rays: None  Sees a specialist for back pain: No  Alleviating factors:   Improved by: medications    Precipitating factors:  Worsened by: Lifting and Bending  Therapies tried and outcome: used ibuprofen 800 mg q 8 hrs during the day and also a muscle relaxer TID for the low back pain , states that the pain is still in the low back , some muscle spasms , no radiation down her legs at times sharp pain up in the thoracic area , no problems with voiding or BM is able to walk and stand up , hard to get in and out of bed in the am ,     Accompanying Signs & Symptoms:  Risk of Fracture: None  Risk of Cauda Equina: None  Risk of Infection: None  Risk of Cancer: None  Risk of Ankylosing Spondylitis: Onset at age <35, male, AND morning back stiffness  no         As above     Review of Systems   Constitutional, HEENT, cardiovascular, pulmonary, GI, , musculoskeletal, neuro, skin, endocrine and psych systems are negative,  except as otherwise noted.      Objective    LMP 08/05/2020 (Approximate)   There is no height or weight on file to calculate BMI.  Physical Exam   GENERAL: healthy, alert and no distress  EYES: Eyes grossly normal to inspection, PERRL and conjunctivae and sclerae normal  NECK: no adenopathy, no asymmetry, masses, or scars and thyroid normal to palpation  RESP: lungs clear to auscultation - no rales, rhonchi or wheezes  CV: regular rate and rhythm, normal S1 S2, no S3 or S4, no murmur, click or rub, no peripheral edema and peripheral pulses strong  ABDOMEN: soft, nontender, no hepatosplenomegaly, no masses and bowel sounds normal  MS: no gross musculoskeletal defects noted, no edema EXCEPT there is lumbar paraspinal muscles spasms , straight legs negative dinora, reflexes are good and equal dinora     No results found for this or any previous visit (from the past 24 hour(s)).        Assessment & Plan     Acute bilateral low back pain with left-sided sciatica  We discussed using heat pads , hot baths showers , also Ibuprofen q 8 hrs as needed with food ,and I recommended she uses the muscle relaxer at bedtime only   - [facility], HAND, AND CHIROPRACTIC REFERRAL; Future  - cyclobenzaprine (FLEXERIL) 5 MG tablet; Take 1 tablet (5 mg) by mouth 3 times daily as needed for muscle spasms       Follow up in 2 weeks sooner if any concerns , I have written a note for work to be off for two weeks , discussed with her she can walk and do gentle stretches meanwhile .    No follow-ups on file.    Tara Underwood MD  [facility]

## 2020-09-04 NOTE — LETTER
[facility]  [address]  [address]  [place], [place] [address]  [phone]    September 4, 2020    RE:  Felipa Bermudez                                                                                                                                                       [address] [facility] [address]            To whom it may concern:    Felipa Bermudez is under my professional care and she is unable to work her current job because of low back pain . We will re evaluate in two weeks .        Sincerely,        Tara Underwood MD      Clinic hours:  Monday 7:30 AM - 5:00 PM    Tuesday  7:00 AM - 7:00 PM    Wednesday  7:00 AM - 5:00 PM    Thursday  7:30 AM -  7:00 PM    Friday   7:30 AM -  5:00 PM

## 2020-09-14 ENCOUNTER — THERAPY VISIT (OUTPATIENT)
Dept: PHYSICAL THERAPY | Facility: CLINIC | Age: 35
End: 2020-09-14
Attending: FAMILY MEDICINE
Payer: COMMERCIAL

## 2020-09-14 DIAGNOSIS — M54.42 ACUTE BILATERAL LOW BACK PAIN WITH LEFT-SIDED SCIATICA: ICD-10-CM

## 2020-09-14 DIAGNOSIS — M54.9 BACK PAIN: ICD-10-CM

## 2020-09-14 PROCEDURE — 97110 THERAPEUTIC EXERCISES: CPT | Mod: GP | Performed by: PHYSICAL THERAPIST

## 2020-09-14 PROCEDURE — 97014 ELECTRIC STIMULATION THERAPY: CPT | Mod: GP | Performed by: PHYSICAL THERAPIST

## 2020-09-14 PROCEDURE — 97161 PT EVAL LOW COMPLEX 20 MIN: CPT | Mod: GP | Performed by: PHYSICAL THERAPIST

## 2020-09-14 NOTE — PROGRESS NOTES
[facility] Initial Evaluation  Subjective:  The history is provided by the patient. No  was used.   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  Has to lift at waist level and turn to the right.  These books were heavier, #35.  Has two children 15yo and 2 year by .  Has not returned to work since the injury.  Medication has has helped.  Taking ibuprofen and muscle relaxers. Pain radiates from the low back to the upper back.  .         Type of problem:  Lumbar.    This is a new condition.  Condition occurred with:  Lifting.    Patient reports pain:  Mid lumbar spine and thoracic spine right.    Pain radiates to:  No radiation.     Associated symptoms:  Loss of motion/stiffness and loss of strength. Symptoms are exacerbated by twisting, bending and certain positions  and relieved by NSAID's, muscle relaxants and heat.                              Objective:    Gait:    Gait Type:  Antalgic                    Lumbar/SI Evaluation  ROM:    AROM Lumbar:   Flexion:        Fingers to ankles, no worse  Ext:                    Pain in right lumbar   Side Bend:        Left:     Right:   Rotation:           Left:     Right:   Side Glide:        Left:     Right:           Lumbar Myotomes:  normal            Lumbar DTR's:  not assessed        Lumbar Dermtomes:  normal                Neural Tension/Mobility:  Neural tension wnl lumbar: back pain.      Right side:   SLR positive.  Lumbar Palpation:      Tenderness present at Right: Erector Spinae  Functional Tests:  Core strength and proprioception lumbar: pain and decreased rotation with walking.        Lumbar Provocation:      Right positive with: PROM hip         Cervical/Thoracic Evaluation    AROM:  AROM Cervical:    Flexion:          Pulls  Extension:       No increase  Rotation:         Left: 50%     Right: 50%  Side Bend:      Left:     Right:                                                                    General     ROS    Assessment/Plan:    Patient is a 35 year old female with lumbar complaints.    Patient has the following significant findings with corresponding treatment plan.                Diagnosis 1:  Back pain  Pain -  hot/cold therapy, electric stimulation, manual therapy, self management, education and directional preference exercise  Decreased ROM/flexibility - manual therapy, therapeutic exercise and home program  Decreased strength - therapeutic exercise, therapeutic activities and home program  Decreased function - therapeutic activities and home program    Therapy Evaluation Codes:   1) History comprised of:   Personal factors that impact the plan of care:      None.    Comorbidity factors that impact the plan of care are:      None.     Medications impacting care: None.  2) Examination of Body Systems comprised of:   Body structures and functions that impact the plan of care:      Lumbar spine.   Activity limitations that impact the plan of care are:      Lifting and Standing.  3) Clinical presentation characteristics are:   Stable/Uncomplicated.  4) Decision-Making    Low complexity using standardized patient assessment instrument and/or measureable assessment of functional outcome.  Cumulative Therapy Evaluation is: Low complexity.    Previous and current functional limitations:  (See Goal Flow Sheet for this information)    Short term and Long term goals: (See Goal Flow Sheet for this information)     Communication ability:  Patient appears to be able to clearly communicate and understand verbal and written communication and follow directions correctly.  Treatment Explanation - The following has been discussed with the patient:   RX ordered/plan of care  Anticipated outcomes  Possible risks and side effects  This patient would benefit from PT intervention to resume normal activities.   Rehab potential is excellent.    Frequency:  2  X week, once daily  Duration:  for 3 weeks  Discharge Plan:  Independent in home treatment program.  Reach maximal therapeutic benefit.    Please refer to the daily flowsheet for treatment today, total treatment time and time spent performing 1:1 timed codes.

## 2020-09-16 ENCOUNTER — THERAPY VISIT (OUTPATIENT)
Dept: PHYSICAL THERAPY | Facility: CLINIC | Age: 35
End: 2020-09-16
Payer: COMMERCIAL

## 2020-09-16 DIAGNOSIS — M54.9 BACK PAIN: ICD-10-CM

## 2020-09-16 PROCEDURE — 97110 THERAPEUTIC EXERCISES: CPT | Mod: GP | Performed by: PHYSICAL THERAPIST

## 2020-09-16 PROCEDURE — 97014 ELECTRIC STIMULATION THERAPY: CPT | Mod: GP | Performed by: PHYSICAL THERAPIST

## 2020-09-16 PROCEDURE — 97112 NEUROMUSCULAR REEDUCATION: CPT | Mod: GP | Performed by: PHYSICAL THERAPIST

## 2020-09-16 NOTE — PROGRESS NOTES
[facility] Initial Evaluation  Subjective:    Patient Health History           General health as reported by patient is good.  Pertinent medical history includes: none.   Red flags:  None as reported by patient.  Medical allergies: none.   Surgeries include:  Other. Other surgery history details: c seection.    Current medications:  Pain medication.    Current occupation is .   Primary job tasks include:  Lifting/carrying, operating a machine/assembly, pushing/pulling, prolonged standing and repetitive tasks.                  Oswestry Score: 40 %                 Objective:  System    Physical Exam    General     ROS    Assessment/Plan:

## 2020-09-21 ENCOUNTER — THERAPY VISIT (OUTPATIENT)
Dept: PHYSICAL THERAPY | Facility: CLINIC | Age: 35
End: 2020-09-21
Attending: FAMILY MEDICINE
Payer: COMMERCIAL

## 2020-09-21 DIAGNOSIS — M54.9 BACK PAIN: ICD-10-CM

## 2020-09-21 PROCEDURE — 97530 THERAPEUTIC ACTIVITIES: CPT | Mod: GP | Performed by: PHYSICAL THERAPIST

## 2020-09-21 PROCEDURE — 97110 THERAPEUTIC EXERCISES: CPT | Mod: GP | Performed by: PHYSICAL THERAPIST

## 2020-09-21 PROCEDURE — 97014 ELECTRIC STIMULATION THERAPY: CPT | Mod: GP | Performed by: PHYSICAL THERAPIST

## 2020-09-23 ENCOUNTER — THERAPY VISIT (OUTPATIENT)
Dept: PHYSICAL THERAPY | Facility: CLINIC | Age: 35
End: 2020-09-23
Payer: COMMERCIAL

## 2020-09-23 DIAGNOSIS — M54.9 BACK PAIN: ICD-10-CM

## 2020-09-23 PROCEDURE — 97110 THERAPEUTIC EXERCISES: CPT | Mod: GP | Performed by: PHYSICAL THERAPIST

## 2020-09-23 PROCEDURE — 97014 ELECTRIC STIMULATION THERAPY: CPT | Mod: GP | Performed by: PHYSICAL THERAPIST

## 2020-09-25 ENCOUNTER — OFFICE VISIT (OUTPATIENT)
Dept: FAMILY MEDICINE | Facility: CLINIC | Age: 35
End: 2020-09-25
Payer: COMMERCIAL

## 2020-09-25 VITALS
HEART RATE: 66 BPM | WEIGHT: 173 LBS | SYSTOLIC BLOOD PRESSURE: 106 MMHG | OXYGEN SATURATION: 99 % | DIASTOLIC BLOOD PRESSURE: 65 MMHG | BODY MASS INDEX: 28.79 KG/M2

## 2020-09-25 DIAGNOSIS — M54.42 ACUTE BILATERAL LOW BACK PAIN WITH LEFT-SIDED SCIATICA: Primary | ICD-10-CM

## 2020-09-25 DIAGNOSIS — Z23 ENCOUNTER FOR IMMUNIZATION: ICD-10-CM

## 2020-09-25 PROCEDURE — 90686 IIV4 VACC NO PRSV 0.5 ML IM: CPT | Performed by: FAMILY MEDICINE

## 2020-09-25 PROCEDURE — 99213 OFFICE O/P EST LOW 20 MIN: CPT | Mod: 25 | Performed by: FAMILY MEDICINE

## 2020-09-25 PROCEDURE — 90471 IMMUNIZATION ADMIN: CPT | Performed by: FAMILY MEDICINE

## 2020-09-25 NOTE — PROGRESS NOTES
Subjective     Felipa Bermudez is a 35 year old female who presents to clinic today for the following health issues:    HPI       Follow up WC back pain   {additonal problems for provider to add (Optional):759067}    Review of Systems   {ROS COMP (Optional):524792}      Objective    There were no vitals taken for this visit.  There is no height or weight on file to calculate BMI.  Physical Exam   {Exam List (Optional):919080}    {Diagnostic Test Results (Optional):068998}        {PROVIDER CHARTING PREFERENCE:373844}

## 2020-09-25 NOTE — PROGRESS NOTES
Subjective     Felipa Bermudez is a 35 year old female who presents to clinic today for the following health issues:    HPI       Back Pain- see encounter form Sept 4th she has started the PT and seems that the back pain is getting better , she would liek to continue with the PT as she is doing some exercises and stretches that help her strengthen her muscles in the back , she is ready to go back to work but still worried about lifting weights as she works in a printing shop , is able to reach down to her feet from standing but pain when she tries to get up or lean backwards  Back injury/starin happened at work August 24th , 2020 .  Onset/Duration: October 24th   Description:   Location of pain: low back bilateral and middle of back bilateral  Character of pain: sharp  Pain radiation: none  New numbness or weakness in legs, not attributed to pain: no   Intensity: Currently 3/10  Progression of Symptoms: improving  History:   Specific cause: lifting  Pain interferes with job: YES  History of back problems: no prior back problems  Any previous MRI or X-rays: None  Sees a specialist for back pain: No  Alleviating factors:   Improved by: muscle relaxants, Physical Therapy and rest    Precipitating factors:  Worsened by: Lifting and Standing  Therapies tried and outcome: muscle relaxants and Physical Therapy    Accompanying Signs & Symptoms:  Risk of Fracture: None  Risk of Cauda Equina: None  Risk of Infection: None  Risk of Cancer: None  Risk of Ankylosing Spondylitis: Onset at age <35, male, AND morning back stiffness no            Review of Systems   Constitutional, HEENT, cardiovascular, pulmonary, GI, , musculoskeletal, neuro, skin, endocrine and psych systems are negative, except as otherwise noted.      Objective    There were no vitals taken for this visit.  There is no height or weight on file to calculate BMI.  Physical Exam   GENERAL: healthy, alert and no distress  EYES: Eyes grossly normal to  inspection, PERRL and conjunctivae and sclerae normal  NECK: no adenopathy, no asymmetry, masses, or scars and thyroid normal to palpation  RESP: lungs clear to auscultation - no rales, rhonchi or wheezes  CV: regular rate and rhythm, normal S1 S2, no S3 or S4, no murmur, click or rub, no peripheral edema and peripheral pulses strong  MS: no gross musculoskeletal defects noted, no edema EXCEPT there is spasms in the paraspinal muscles in the lumbar area straight leg is negative dinora     No results found for this or any previous visit (from the past 24 hour(s)).        Assessment & Plan     Acute bilateral low back pain with left-sided sciatica  She is doing Pt and that is going well , she will continue , I have written her a note/letter for retrun to work on Monday with some lifting restrictions , no lifting more than 10 lbs at work   F/u in one month sooner if any concerns . Pt is aware  and comfortable with the current plan.      Encounter for immunization  She got her flu shot today   - VACCINE ADMINISTRATION, INITIAL  - INFLUENZA VACCINE IM > 6 MONTHS VALENT IIV4 [73749]         Pt is aware  and comfortable with the current plan.      Tara Underwood MD  [facility]

## 2020-09-25 NOTE — LETTER
[facility]  [address]  [address]  [place], [place] [address]  [phone]    September 25, 2020    RE:  Felipa Bermudez                                                                                                                                                       [address] [facility] [address]            To whom it may concern:    Felipa Bermudez is under my professional care and she can go back to work on Monday September 28th, 2020 . She needs to have weight restrictions , no lifting anything more than 10 lbs at work .    Sincerely,        Tara Underwood MD      Clinic hours:  Monday 7:30 AM - 5:00 PM    Tuesday  7:00 AM - 7:00 PM    Wednesday  7:00 AM - 5:00 PM    Thursday  7:30 AM -  7:00 PM    Friday   7:30 AM -  5:00 PM

## 2020-09-28 ENCOUNTER — THERAPY VISIT (OUTPATIENT)
Dept: PHYSICAL THERAPY | Facility: CLINIC | Age: 35
End: 2020-09-28
Payer: COMMERCIAL

## 2020-09-28 DIAGNOSIS — M54.9 BACK PAIN: ICD-10-CM

## 2020-09-28 PROCEDURE — 97110 THERAPEUTIC EXERCISES: CPT | Mod: GP | Performed by: PHYSICAL THERAPIST

## 2020-09-28 PROCEDURE — 97014 ELECTRIC STIMULATION THERAPY: CPT | Mod: GP | Performed by: PHYSICAL THERAPIST

## 2020-10-13 ENCOUNTER — THERAPY VISIT (OUTPATIENT)
Dept: PHYSICAL THERAPY | Facility: CLINIC | Age: 35
End: 2020-10-13
Payer: COMMERCIAL

## 2020-10-13 DIAGNOSIS — M54.9 BACK PAIN: ICD-10-CM

## 2020-10-13 PROCEDURE — 97110 THERAPEUTIC EXERCISES: CPT | Mod: GP | Performed by: PHYSICAL THERAPIST

## 2020-10-20 ENCOUNTER — THERAPY VISIT (OUTPATIENT)
Dept: PHYSICAL THERAPY | Facility: CLINIC | Age: 35
End: 2020-10-20
Payer: COMMERCIAL

## 2020-10-20 DIAGNOSIS — M54.9 BACK PAIN: ICD-10-CM

## 2020-10-20 PROCEDURE — 97140 MANUAL THERAPY 1/> REGIONS: CPT | Mod: GP | Performed by: PHYSICAL THERAPIST

## 2020-10-20 PROCEDURE — 97110 THERAPEUTIC EXERCISES: CPT | Mod: GP | Performed by: PHYSICAL THERAPIST

## 2020-10-28 ENCOUNTER — TELEPHONE (OUTPATIENT)
Dept: OBGYN | Facility: CLINIC | Age: 35
End: 2020-10-28

## 2020-10-28 NOTE — TELEPHONE ENCOUNTER
Left message for patient. Let her know she can try tub soaks for a few days, twice daily. Can give time for cut to heal and can use over the counter monistat cream as well. If still persisting then should be seen. Ultimately let her know she should be seen if having large amounts of discomfort or discharge. Can cancel appt if wanting to try at home remedies first.     Angelica Hoffman RN

## 2020-10-28 NOTE — TELEPHONE ENCOUNTER
Patient has a cut in her vaginal area and this is why she is experiencing inching.  Patient is wondering if she should still be seen.  Please call her back.  Please leave a detailed message if she doesn't answer.     Thank you,  Susanne

## 2020-11-03 ENCOUNTER — THERAPY VISIT (OUTPATIENT)
Dept: PHYSICAL THERAPY | Facility: CLINIC | Age: 35
End: 2020-11-03
Payer: COMMERCIAL

## 2020-11-03 DIAGNOSIS — M54.9 BACK PAIN: ICD-10-CM

## 2020-11-03 PROCEDURE — 97110 THERAPEUTIC EXERCISES: CPT | Mod: GP | Performed by: PHYSICAL THERAPIST

## 2020-11-03 PROCEDURE — 97112 NEUROMUSCULAR REEDUCATION: CPT | Mod: GP | Performed by: PHYSICAL THERAPIST

## 2020-11-03 NOTE — PROGRESS NOTES
PROGRESS  REPORT    Progress reporting period is from 9-14-20 to 11-3-20.       SUBJECTIVE    Subjective: Reports she is having pain when she 1st  wakes up and at the end of the day.    Current Pain level: 2/10.     Previous pain level was  4/10  .   Changes in function:  Yes (See Goal flowsheet attached for changes in current functional level)  Adverse reaction to treatment or activity: None    OBJECTIVE  Changes noted in objective findings:  The objective findings below are from DOS 11-3-20.  Objective: Her pain is central lower back. Her standing job is going better not having pain at work. Did lift a box yesterday and felt a little pain. AROM:  flexion wnl no pain Ext painful RSB no pain L SB pain. Pain with PA's over L1-4  seems less painful than last visit. Added H&K exs. Tried to progress the abdominals but this hurts. Plan RTC 2 weeks     ASSESSMENT/PLAN  Updated problem list and treatment plan: Diagnosis 1:  LBP  Pain -  hot/cold therapy and manual therapy  Decreased ROM/flexibility - manual therapy and therapeutic exercise  Decreased strength - therapeutic exercise and therapeutic activities  Impaired muscle performance - neuro re-education  Decreased function - therapeutic activities  STG/LTGs have been met or progress has been made towards goals:  Yes (See Goal flow sheet completed today.)  Assessment of Progress: The patient's condition is improving.  Self Management Plans:  Patient has been instructed in a home treatment program.  I have re-evaluated this patient and find that the nature, scope, duration and intensity of the therapy is appropriate for the medical condition of the patient.  Felipa continues to require the following intervention to meet STG and LTG's:  PT    Recommendations:  This patient would benefit from continued therapy.     Frequency:  1 X per 2  weeks, once daily  Duration:  for 6 weeks        Please refer to the daily flowsheet for treatment today, total treatment time and  time spent performing 1:1 timed codes.

## 2020-12-04 ENCOUNTER — OFFICE VISIT (OUTPATIENT)
Dept: FAMILY MEDICINE | Facility: CLINIC | Age: 35
End: 2020-12-04
Payer: COMMERCIAL

## 2020-12-04 VITALS
RESPIRATION RATE: 20 BRPM | WEIGHT: 183.2 LBS | DIASTOLIC BLOOD PRESSURE: 77 MMHG | SYSTOLIC BLOOD PRESSURE: 114 MMHG | HEART RATE: 77 BPM | TEMPERATURE: 98.2 F | OXYGEN SATURATION: 99 % | BODY MASS INDEX: 30.52 KG/M2 | HEIGHT: 65 IN

## 2020-12-04 DIAGNOSIS — M54.42 ACUTE BILATERAL LOW BACK PAIN WITH LEFT-SIDED SCIATICA: Primary | ICD-10-CM

## 2020-12-04 PROCEDURE — 99214 OFFICE O/P EST MOD 30 MIN: CPT | Performed by: FAMILY MEDICINE

## 2020-12-04 RX ORDER — CYCLOBENZAPRINE HCL 5 MG
5 TABLET ORAL AT BEDTIME
Qty: 30 TABLET | Refills: 0 | Status: SHIPPED | OUTPATIENT
Start: 2020-12-04 | End: 2021-03-30

## 2020-12-04 ASSESSMENT — MIFFLIN-ST. JEOR: SCORE: 1521.87

## 2020-12-04 NOTE — PROGRESS NOTES
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 (Select all that apply) low back bilateral and middle of back bilateral    How would you describe your back pain?  sharp    Where does your back pain spread? the right and left thigh and the right and left knee    Since your last clinic visit for back pain, how has your pain changed? always present, but gets better and worse    Does your back pain interfere with your job? 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 Yes -  muscle relaxants and Physical Therapy       How many servings of fruits and vegetables do you eat daily?  0-1    On average, how many sweetened beverages do you drink each day (Examples: soda, juice, sweet tea, etc.  Do NOT count diet or artificially sweetened beverages)?   1    How many days per week do you exercise enough to make your heart beat faster? 3 or less    How many minutes a day do you exercise enough to make your heart beat faster? 9 or less    How many days per week do you miss taking your medication? 0        Review of Systems   Constitutional, HEENT, cardiovascular, pulmonary, GI, , musculoskeletal, neuro, skin, endocrine and psych systems are negative, except as otherwise noted.      Objective    /77 (Patient  "Position: Sitting, Cuff Size: Adult Regular)   Pulse 77   Temp 98.2  F (36.8  C) (Tympanic)   Resp 20   Ht 1.651 m (5' 5\")   Wt 83.1 kg (183 lb 3.2 oz)   LMP 12/02/2020 (Exact Date)   SpO2 99%   BMI 30.49 kg/m    Body mass index is 30.49 kg/m .  Physical Exam   GENERAL: healthy, alert and no distress  EYES: Eyes grossly normal to inspection, PERRL and conjunctivae and sclerae normal  NECK: no adenopathy, no asymmetry, masses, or scars and thyroid normal to palpation  MS: no gross musculoskeletal defects noted, no edema EXCEPT there is some spasms in the paraspinal muscles of the lumbar area , straight leg is negative dinora   NEURO: Normal strength and tone, mentation intact and speech normal    No results found for this or any previous visit (from the past 24 hour(s)).        Assessment & Plan     Acute bilateral low back pain with left-sided sciatica  We discussed doing a round of PT , also to do the exercises at home , stretches etc , ice vs heat pads application, NSAIDS with food during the day and muscle relaxer at bedtime to help with the pain.  She should follow up in one month , sooner if worsening pain or other symptoms develop  - [facility] PT, HAND, AND CHIROPRACTIC REFERRAL; Future  - cyclobenzaprine (FLEXERIL) 5 MG tablet; Take 1 tablet (5 mg) by mouth At Bedtime     BMI:   Estimated body mass index is 30.49 kg/m  as calculated from the following:    Height as of this encounter: 1.651 m (5' 5\").    Weight as of this encounter: 83.1 kg (183 lb 3.2 oz).       RTC if no improving or worsening.  Pt is aware  and comfortable with the current plan.      Tara Underwood MD  [facility] [place]    "

## 2020-12-07 ENCOUNTER — THERAPY VISIT (OUTPATIENT)
Dept: PHYSICAL THERAPY | Facility: CLINIC | Age: 35
End: 2020-12-07
Payer: COMMERCIAL

## 2020-12-07 DIAGNOSIS — M54.9 BACK PAIN: ICD-10-CM

## 2020-12-07 PROCEDURE — 97010 HOT OR COLD PACKS THERAPY: CPT | Mod: GP | Performed by: PHYSICAL THERAPIST

## 2020-12-07 PROCEDURE — 97140 MANUAL THERAPY 1/> REGIONS: CPT | Mod: GP | Performed by: PHYSICAL THERAPIST

## 2020-12-07 PROCEDURE — 97014 ELECTRIC STIMULATION THERAPY: CPT | Performed by: PHYSICAL THERAPIST

## 2020-12-07 PROCEDURE — 97110 THERAPEUTIC EXERCISES: CPT | Mod: GP | Performed by: PHYSICAL THERAPIST

## 2021-01-13 NOTE — PROGRESS NOTES
Subjective:  HPI  Physical Exam                    Objective:  System    Physical Exam    General     ROS    Assessment/Plan:    PROGRESS  REPORT    Progress reporting period is from 9/14/2020 to 12/7/2020.       SUBJECTIVE  Subjective changes noted by patient:  .  Subjective: Working in [employer] now.  Continued to have some pain after last working at the [employer].  Worked Black Friday and had back pain increase  with this.   Worked 8 hours that day. Normally only does 5 hours a day.  Taking muscles relaxants.      Current pain level is NA  .     Previous pain level was  NA  .   Changes in function:  Yes (See Goal flowsheet attached for changes in current functional level)  Adverse reaction to treatment or activity: None    OBJECTIVE  Changes noted in objective findings:  Yes,   Objective: Reviewed stretching exercises that she can do at work during the day.  Also discussed getting supportive shoes for walking and standing at work to help her back.  Central pain today with forward bend.  No radiating pain.   Strengthening for the back and abdominal muscles is going well.       ASSESSMENT/PLAN  Updated problem list and treatment plan: Diagnosis 1:  Back pain  Pain -  manual therapy, self management, education and home program  Decreased ROM/flexibility - manual therapy, therapeutic exercise and home program  Decreased strength - therapeutic exercise, therapeutic activities and home program  Decreased function - therapeutic activities and home program  Impaired posture - neuro re-education and home program  STG/LTGs have been met or progress has been made towards goals:  Yes (See Goal flow sheet completed today.)  Assessment of Progress: The patient's condition is improving.  The patient's condition has potential to improve.  The patient has not returned to therapy. Current status is unknown.  Self Management Plans:  Patient has been instructed in a home treatment program.    Felipa continues to require the  following intervention to meet STG and LTG's:  Patient needs to continue to work on the home exercise program.      Recommendations:  Will discharge since patient has not returned.      Please refer to the daily flowsheet for treatment today, total treatment time and time spent performing 1:1 timed codes.

## 2021-03-12 ENCOUNTER — OFFICE VISIT (OUTPATIENT)
Dept: FAMILY MEDICINE | Facility: CLINIC | Age: 36
End: 2021-03-12
Payer: OTHER MISCELLANEOUS

## 2021-03-12 VITALS
DIASTOLIC BLOOD PRESSURE: 63 MMHG | BODY MASS INDEX: 29.99 KG/M2 | RESPIRATION RATE: 14 BRPM | WEIGHT: 180 LBS | SYSTOLIC BLOOD PRESSURE: 109 MMHG | HEART RATE: 72 BPM | HEIGHT: 65 IN | OXYGEN SATURATION: 98 %

## 2021-03-12 DIAGNOSIS — M54.42 ACUTE BILATERAL LOW BACK PAIN WITH LEFT-SIDED SCIATICA: Primary | ICD-10-CM

## 2021-03-12 PROCEDURE — 99214 OFFICE O/P EST MOD 30 MIN: CPT | Performed by: FAMILY MEDICINE

## 2021-03-12 ASSESSMENT — MIFFLIN-ST. JEOR: SCORE: 1507.35

## 2021-03-12 ASSESSMENT — PAIN SCALES - GENERAL: PAINLEVEL: SEVERE PAIN (6)

## 2021-03-12 NOTE — PROGRESS NOTES
"    Assessment & Plan     Acute bilateral low back pain with left-sided sciatica  We discussed doing the MRI now and then follow up after that to make a follow up plan , will wait on another round of PT until the MRI results are back . Meanwhile she can do stretches , walks , no heavy lifting   - MR Lumbar Spine w/o & w Contrast; Future             BMI:   Estimated body mass index is 29.95 kg/m  as calculated from the following:    Height as of this encounter: 1.651 m (5' 5\").    Weight as of this encounter: 81.6 kg (180 lb).       Follow up after the MRI is done in day or two after that , sooner if worsening or new symptoms develop .  Pt is aware  and comfortable with the current plan.    Tara Underwood MD  [facility]   Felipa is a 35 year old who presents for the following health issues     HPI   She was doing well with doing Pt but in January got Covid and then her  got it and she was not able to go for PT for over a couple of months recovering and being in quarantine .  Pain meanwhile was better , doing well with the back pain and it was 2 out 10 at that time , off the PT for January and February   [place] tried  plaing soccer with her 2 yr old then the next day was in a lot of pain - low back , radiates down her left leg and now some down the right one too .  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  Pain comes with physical activity and she is wondering if she should continue with PT  , will she always have pain with even walking or playing with her son .    Chronic/Recurring Back Pain Follow Up      Where is your back pain located? (Select all that apply) low back bilateral    How would you describe your back pain?  sharp    Where does your back pain spread? the right and left legs    Since your last clinic " visit for back pain, how has your pain changed? unchanged and worse at times     Does your back pain interfere with your job? No    Since your last visit, have you tried any new treatment? No          Review of Systems   Constitutional, HEENT, cardiovascular, pulmonary, GI, , musculoskeletal, neuro, skin, endocrine and psych systems are negative, except as otherwise noted.      Objective    There were no vitals taken for this visit.  There is no height or weight on file to calculate BMI.  Physical Exam   GENERAL: healthy, alert and no distress  EYES: Eyes grossly normal to inspection, PERRL and conjunctivae and sclerae normal  NECK: no adenopathy, no asymmetry, masses, or scars and thyroid normal to palpation  RESP: lungs clear to auscultation - no rales, rhonchi or wheezes  CV: regular rate and rhythm, normal S1 S2, no S3 or S4, no murmur, click or rub, no peripheral edema and peripheral pulses strong  MS: no gross musculoskeletal defects noted, no edema EXCEPT there is exaggerated lumbar lordosis and some muscle spasms in the paraspinal muscles in the lumbar area , straight leg is negative dinora and reflexes are normal dinora   NEURO: Normal strength and tone, mentation intact and speech normal    No results found for this or any previous visit (from the past 24 hour(s)).

## 2021-03-23 ENCOUNTER — HOSPITAL ENCOUNTER (OUTPATIENT)
Dept: MRI IMAGING | Facility: CLINIC | Age: 36
Discharge: HOME | End: 2021-03-23
Attending: FAMILY MEDICINE | Admitting: FAMILY MEDICINE
Payer: COMMERCIAL

## 2021-03-23 DIAGNOSIS — M54.42 ACUTE BILATERAL LOW BACK PAIN WITH LEFT-SIDED SCIATICA: ICD-10-CM

## 2021-03-23 PROCEDURE — 72148 MRI LUMBAR SPINE W/O DYE: CPT

## 2021-03-30 ENCOUNTER — VIRTUAL VISIT (OUTPATIENT)
Dept: FAMILY MEDICINE | Facility: CLINIC | Age: 36
End: 2021-03-30
Payer: COMMERCIAL

## 2021-03-30 DIAGNOSIS — M54.42 ACUTE BILATERAL LOW BACK PAIN WITH LEFT-SIDED SCIATICA: Primary | ICD-10-CM

## 2021-03-30 PROCEDURE — 99213 OFFICE O/P EST LOW 20 MIN: CPT | Mod: 95 | Performed by: FAMILY MEDICINE

## 2021-03-30 RX ORDER — CYCLOBENZAPRINE HCL 5 MG
5 TABLET ORAL AT BEDTIME
Qty: 30 TABLET | Refills: 0 | Status: SHIPPED | OUTPATIENT
Start: 2021-03-30 | End: 2021-12-01

## 2021-03-30 NOTE — PROGRESS NOTES
"Felipa is a 35 year old who is being evaluated via a billable telephone visit.      What phone number would you like to be contacted at? [phone]  How would you like to obtain your AVS? Mail a copy    Assessment & Plan     Acute bilateral low back pain with left-sided sciatica  We discussed since she has done PT for three months and still has pain , will refer to the spine center , for possible steroid injections, she might benefit from more PT , referral given , also she can use NSAIDS during the day as needed q 6 hrs if using the Ibuprofen 600 mg , at bedtime she can use the Flexeril at 5 mg sparingly   - Orthopedic & Spine  Referral; Future  - cyclobenzaprine (FLEXERIL) 5 MG tablet; Take 1 tablet (5 mg) by mouth At Bedtime      BMI:   Estimated body mass index is 29.95 kg/m  as calculated from the following:    Height as of 3/12/21: 5' 5\" (1.651 m).    Weight as of 3/12/21: 180 lb (81.6 kg).     RTC if no improving or worsening.  Pt is aware  and comfortable with the current plan.      Tara Underwood MD  [facility]    Subjective   Felipa is a 35 year old who presents for the following health issues     HPI     Pt here to discuss 3/23/21 MRI results - no change in pain.   Low back pain which has been since August of 2020 , she has had days wthout pain but recently in the past couple of months it has been triggered by physical activities or she will get it after psychial activities even walking or playing with her son , kicking a ball. She denies any numbness or tingling she has been using only ibuprfoen during the day she ran out of the muscle relaxant which was Rx back in December 2020   Her MRI was normal , no disc herniations , no stenosis etc some mild facet arthropathy at L3-L4, L4-L5 and L5-S1 otherwise normal MRI     Review of Systems   Constitutional, HEENT, cardiovascular, pulmonary, GI, , musculoskeletal, neuro, skin, endocrine and psych systems are negative, " except as otherwise noted.      Objective           Vitals:  No vitals were obtained today due to virtual visit.    Physical Exam   healthy, alert and no distress  PSYCH: Alert and oriented times 3; coherent speech, normal   rate and volume, able to articulate logical thoughts, able   to abstract reason, no tangential thoughts, no hallucinations   or delusions  Her affect is normal  RESP: No cough, no audible wheezing, able to talk in full sentences  Remainder of exam unable to be completed due to telephone visits    No results found for this or any previous visit (from the past 24 hour(s)).            Phone call duration: 10 minutes

## 2021-04-09 ENCOUNTER — VIRTUAL VISIT (OUTPATIENT)
Dept: FAMILY MEDICINE | Facility: CLINIC | Age: 36
End: 2021-04-09
Payer: COMMERCIAL

## 2021-04-09 DIAGNOSIS — M54.42 ACUTE BILATERAL LOW BACK PAIN WITH LEFT-SIDED SCIATICA: Primary | ICD-10-CM

## 2021-04-09 PROCEDURE — 99213 OFFICE O/P EST LOW 20 MIN: CPT | Mod: 95 | Performed by: FAMILY MEDICINE

## 2021-07-23 ENCOUNTER — VIRTUAL VISIT (OUTPATIENT)
Dept: URGENT CARE | Facility: CLINIC | Age: 36
End: 2021-07-23
Payer: COMMERCIAL

## 2021-07-23 DIAGNOSIS — J06.9 VIRAL URI WITH COUGH: Primary | ICD-10-CM

## 2021-07-23 PROCEDURE — 99212 OFFICE O/P EST SF 10 MIN: CPT | Mod: 95 | Performed by: NURSE PRACTITIONER

## 2021-07-23 NOTE — PATIENT INSTRUCTIONS
"Call to schedule COVID 19 test at [phone].    After Your COVID-19 (Coronavirus) Test  You have been tested for COVID-19 (coronavirus).   If you'll have surgery in the next few days, we'll let you know ahead of time if you have the virus. Please call your surgeon's office with any questions.  For all other patients: Results are usually available in [facility] within 2 to 3 days.   If you do not have a [employer] account, you'll get a letter in the mail in about 7 to 10 days.   [facility] is often the fastest way to get test results. Please sign up if you do not already have a [employer] account. See the handout Getting COVID-19 Test Results in [employer] for help.  What if my test result is positive?  If your test is positive and you have not viewed your result in [facility], you'll get a phone call with your result. (A positive test means that you have the virus.)     Follow the tips under \"How do I self-isolate?\" below for 10 days (20 days if you have a weak immune system).    You don't need to be retested for COVID-19 before going back to school or work. As long as you're fever-free and feeling better, you can go back to school, work and other activities after waiting the 10 or 20 days.  What if I have questions after I get my results?  If you have questions about your results, please visit our testing website at [email or web address].   After 7 to 10 days, if you have not gotten your results:     Call [phone] ([phone]) and ask to speak with our COVID-19 results team.    If you're being treated at an infusion center: Call your infusion center directly.  What are the symptoms of COVID-19?  Cough, fever and trouble breathing are the most common signs of COVID-19.  Other symptoms can include new headaches, new muscle or body aches, new and unexplained fatigue (feeling very tired), chills, sore throat, congestion (stuffy or runny nose), diarrhea (loose poop), loss of taste or smell, belly " "pain, and nausea or vomiting (feeling sick to your stomach or throwing up).  You may already have symptoms of COVID-19, or they may show up later.  What should I do if I have symptoms?  If you're having surgery: Call your surgeon's office.  For all other patients: Stay home and away from others (self-isolate) until ...    You've had no fever--and no medicine that reduces fever--for 1 full day (24 hours), AND    Other symptoms have gotten better. For example, your cough or breathing has improved, AND    At least 10 days have passed since your symptoms first started.  How do I self-isolate?  1. Stay in your own room, even for meals. Use your own bathroom if you can.  2. Stay away from others in your home. No hugging, kissing or shaking hands. No visitors.  3. Don't go to work, school or anywhere else.  4. Clean \"high touch\" surfaces often (doorknobs, counters, handles). Use household cleaning spray or wipes. You'll find a full list of  on the EPA website: www.epa.gov/pesticide-registration/list-n-disinfectants-use-against-sars-cov-2.  5. Cover your mouth and nose with a mask or other face covering to avoid spreading germs.  6. Wash your hands and face often. Use soap and water.  7. Caregivers in these groups are at risk for severe illness due to COVID-19:  1. People 65 years and older  2. People who live in a nursing home or long-term care facility  3. People with chronic disease (lung, heart, cancer, diabetes, kidney, liver, immunologic)  4. People who have a weakened immune system, including those who:    Are in cancer treatment    Take medicine that weakens the immune system, such as corticosteroids    Had a bone marrow or organ transplant    Have an immune deficiency    Have poorly controlled HIV or AIDS    Are obese (body mass index of 40 or higher)    Smoke regularly  8. Caregivers should wear gloves while washing dishes, handling laundry and cleaning bedrooms and bathrooms.  9. Use caution when washing " and drying laundry: Don't shake dirty laundry and use the warmest water setting that you can.  10. For more tips on managing your health at home, go to www.cdc.gov/coronavirus/2019-ncov/downloads/10Things.pdf.  How can I take care of myself at home?  1. Get lots of rest. Drink extra fluids (unless a doctor has told you not to).  2. Take Tylenol (acetaminophen) for fever or pain. If you have liver or kidney problems, ask your family doctor if it's OK to take Tylenol.   Adults can take either:  ? 650 mg (two 325 mg pills) every 4 to 6 hours, or   ? 1,000 mg (two 500 mg pills) every 8 hours as needed.  ? Note: Don't take more than 3,000 mg in one day. Acetaminophen is found in many medicines (both prescribed and over-the-counter medicines). Read all labels to be sure you don't take too much.   For children, check the Tylenol bottle for the right dose. The dose is based on the child's age or weight.  3. If you have other health problems (like cancer, heart failure, an organ transplant or severe kidney disease): Call your specialty clinic if you don't feel better in the next 2 days.  4. Know when to call 911. Emergency warning signs include:  ? Trouble breathing or shortness of breath  ? Chest pain or pressure that doesn't go away  ? Feeling confused like you haven't felt before, or not being able to wake up  ? Bluish-colored lips or face  5. If your doctor prescribed a blood thinner medicine: Follow their instructions.  Where can I get more information?  1.  [facility] - About COVID-19:   [email or web address]  2. CDC - If You're Sick: cdc.gov/coronavirus/2019-ncov/about/steps-when-sick.html  3. CDC - Ending Home Isolation: www.cdc.gov/coronavirus/2019-ncov/hcp/disposition-in-home-patients.html  4. CDC - Caring for Someone: www.cdc.gov/coronavirus/2019-ncov/if-you-are-sick/care-for-someone.html  5. [facility] - Interim Guidance for Hospital Discharge to Home:  www.health.[facility].[place]/diseases/coronavirus/hcp/hospdischarge.pdf  6. [facility] clinical trials (COVID-19 research studies): [email or web address]  7. Below are the COVID-19 hotlines at the [place] Department of Health ([facility]). Interpreters are available.  ? For health questions: Call 061-519-8803 or 1-520.170.7129 (7 a.m. to 7 p.m.)  ? For questions about schools and childcare: Call 274-462-1016 or 1-519.678.7905 (7 a.m. to 7 p.m.)    For informational purposes only. Not to replace the advice of your health care provider. Clinically reviewed by Infection Prevention and the [facility] COVID-19 Clinical Team. Copyright   2020 [facility]. All rights reserved. [employer] 037017 - Rev 11/11/20.

## 2021-07-23 NOTE — PROGRESS NOTES
"  Felipa Bermudez is a 36 year old female who is being evaluated via a billable telephone visit.      The patient has been notified of following:     \"This telephone visit will be conducted via a call between you and your physician/provider. We have found that certain health care needs can be provided without the need for a physical exam.  This service lets us provide the care you need with a short phone conversation.  If a prescription is necessary we can send it directly to your pharmacy.  If lab work is needed we can place an order for that and you can then stop by our lab to have the test done at a later time.    If during the course of the call the physician/provider feels a telephone visit is not appropriate, you will not be charged for this service.\"     Patient has given verbal consent for Telephone visit?  YES     ASSESSMENT:     No diagnosis found.      Worrisome symptoms discussed with instructions to go to the ED.  Patient verbalized understanding and agreed with this plan.  Chief Complaint:    No chief complaint on file.      HPI: Felipa Bermudez is an 36 year old female who calls with concerns that include ST, runny nose and body aches for 2 days. She requests COVID 19 test.    She has been vaccinated for COVID and had COVID in February 2021.    ROS:    A 10 point ROS is negative except as expressed in HPI.    Past Medical History  Past Medical History:   Diagnosis Date     Herpes        Allergies:  Allergies   Allergen Reactions     No Known Allergies         Current Meds:    Current Outpatient Medications:      cyclobenzaprine (FLEXERIL) 5 MG tablet, Take 1 tablet (5 mg) by mouth At Bedtime, Disp: 30 tablet, Rfl: 0     ibuprofen (ADVIL/MOTRIN) 800 MG tablet, Take 1 tablet (800 mg) by mouth every 8 hours as needed for moderate pain, Disp: 50 tablet, Rfl: 0     tiZANidine (ZANAFLEX) 2 MG tablet, Take 1 tablet (2 mg) by mouth 3 times daily, Disp: 25 tablet, Rfl: 0     PHYSICAL EXAM:     Patient is " alert and oriented. Able to speak in full sentences. No wheezing or cough heard during telephone conversation. Mood is appropriate.     Yolette Hawley CNP  7/23/2021, 3:01 PM      Phone call duration:  11 minutes

## 2021-11-10 ENCOUNTER — TELEPHONE (OUTPATIENT)
Dept: OBGYN | Facility: CLINIC | Age: 36
End: 2021-11-10
Payer: COMMERCIAL
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
## 2021-11-19 ENCOUNTER — HOSPITAL ENCOUNTER (EMERGENCY)
Facility: CLINIC | Age: 36
Discharge: HOME | End: 2021-11-19
Attending: EMERGENCY MEDICINE | Admitting: EMERGENCY MEDICINE
Payer: COMMERCIAL

## 2021-11-19 ENCOUNTER — APPOINTMENT (OUTPATIENT)
Dept: ULTRASOUND IMAGING | Facility: CLINIC | Age: 36
End: 2021-11-19
Attending: EMERGENCY MEDICINE
Payer: COMMERCIAL

## 2021-11-19 VITALS
BODY MASS INDEX: 31.62 KG/M2 | HEART RATE: 61 BPM | SYSTOLIC BLOOD PRESSURE: 101 MMHG | OXYGEN SATURATION: 99 % | RESPIRATION RATE: 16 BRPM | TEMPERATURE: 98.6 F | DIASTOLIC BLOOD PRESSURE: 51 MMHG | WEIGHT: 190 LBS

## 2021-11-19 DIAGNOSIS — O41.8X10 SUBCHORIONIC HEMATOMA IN FIRST TRIMESTER, SINGLE OR UNSPECIFIED FETUS: ICD-10-CM

## 2021-11-19 DIAGNOSIS — O46.8X1 SUBCHORIONIC HEMATOMA IN FIRST TRIMESTER, SINGLE OR UNSPECIFIED FETUS: ICD-10-CM

## 2021-11-19 DIAGNOSIS — R10.32 LLQ ABDOMINAL PAIN: ICD-10-CM

## 2021-11-19 DIAGNOSIS — Z34.01 PREGNANCY, FIRST, FIRST TRIMESTER: ICD-10-CM

## 2021-11-19 LAB
ANION GAP SERPL CALCULATED.3IONS-SCNC: 7 MMOL/L (ref 3–14)
B-HCG SERPL-ACNC: ABNORMAL IU/L (ref 0–5)
BASOPHILS # BLD AUTO: 0 10E3/UL (ref 0–0.2)
BASOPHILS NFR BLD AUTO: 0 %
BUN SERPL-MCNC: 14 MG/DL (ref 7–30)
CALCIUM SERPL-MCNC: 9 MG/DL (ref 8.5–10.1)
CHLORIDE BLD-SCNC: 105 MMOL/L (ref 94–109)
CO2 SERPL-SCNC: 23 MMOL/L (ref 20–32)
CREAT SERPL-MCNC: 0.57 MG/DL (ref 0.52–1.04)
EOSINOPHIL # BLD AUTO: 0.4 10E3/UL (ref 0–0.7)
EOSINOPHIL NFR BLD AUTO: 4 %
ERYTHROCYTE [DISTWIDTH] IN BLOOD BY AUTOMATED COUNT: 12.4 % (ref 10–15)
GFR SERPL CREATININE-BSD FRML MDRD: >90 ML/MIN/1.73M2
GLUCOSE BLD-MCNC: 106 MG/DL (ref 70–99)
HCT VFR BLD AUTO: 40.3 % (ref 35–47)
HGB BLD-MCNC: 13.8 G/DL (ref 11.7–15.7)
HOLD SPECIMEN: NORMAL
IMM GRANULOCYTES # BLD: 0 10E3/UL
IMM GRANULOCYTES NFR BLD: 0 %
LYMPHOCYTES # BLD AUTO: 2.9 10E3/UL (ref 0.8–5.3)
LYMPHOCYTES NFR BLD AUTO: 29 %
MCH RBC QN AUTO: 30.1 PG (ref 26.5–33)
MCHC RBC AUTO-ENTMCNC: 34.2 G/DL (ref 31.5–36.5)
MCV RBC AUTO: 88 FL (ref 78–100)
MONOCYTES # BLD AUTO: 0.7 10E3/UL (ref 0–1.3)
MONOCYTES NFR BLD AUTO: 7 %
NEUTROPHILS # BLD AUTO: 6.2 10E3/UL (ref 1.6–8.3)
NEUTROPHILS NFR BLD AUTO: 60 %
NRBC # BLD AUTO: 0 10E3/UL
NRBC BLD AUTO-RTO: 0 /100
PLATELET # BLD AUTO: 291 10E3/UL (ref 150–450)
POTASSIUM BLD-SCNC: 3.4 MMOL/L (ref 3.4–5.3)
RBC # BLD AUTO: 4.58 10E6/UL (ref 3.8–5.2)
SODIUM SERPL-SCNC: 135 MMOL/L (ref 133–144)
WBC # BLD AUTO: 10.1 10E3/UL (ref 4–11)

## 2021-11-19 PROCEDURE — 99284 EMERGENCY DEPT VISIT MOD MDM: CPT | Mod: 25

## 2021-11-19 PROCEDURE — 76801 OB US < 14 WKS SINGLE FETUS: CPT

## 2021-11-19 PROCEDURE — 80048 BASIC METABOLIC PNL TOTAL CA: CPT | Performed by: EMERGENCY MEDICINE

## 2021-11-19 PROCEDURE — 84702 CHORIONIC GONADOTROPIN TEST: CPT | Performed by: EMERGENCY MEDICINE

## 2021-11-19 PROCEDURE — 36415 COLL VENOUS BLD VENIPUNCTURE: CPT | Performed by: EMERGENCY MEDICINE

## 2021-11-19 PROCEDURE — 85025 COMPLETE CBC W/AUTO DIFF WBC: CPT | Performed by: EMERGENCY MEDICINE

## 2021-11-19 ASSESSMENT — ENCOUNTER SYMPTOMS
FREQUENCY: 1
SHORTNESS OF BREATH: 0
NAUSEA: 1
SORE THROAT: 0
DYSURIA: 0
FEVER: 0
COUGH: 0
ABDOMINAL PAIN: 1
VOMITING: 0
DIARRHEA: 0
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
## 2021-12-01 ENCOUNTER — PRENATAL OFFICE VISIT (OUTPATIENT)
Dept: NURSING | Facility: CLINIC | Age: 36
End: 2021-12-01
Payer: COMMERCIAL

## 2021-12-01 VITALS — BODY MASS INDEX: 31.62 KG/M2 | WEIGHT: 190 LBS

## 2021-12-01 DIAGNOSIS — O09.91 SUPERVISION OF HIGH RISK PREGNANCY IN FIRST TRIMESTER: Primary | ICD-10-CM

## 2021-12-01 PROCEDURE — 99207 PR NO CHARGE NURSE ONLY: CPT

## 2021-12-01 RX ORDER — CHOLECALCIFEROL (VITAMIN D3) 25 MCG
1 TABLET,CHEWABLE ORAL DAILY
COMMUNITY
End: 2023-03-14

## 2021-12-01 NOTE — PROGRESS NOTES
SUBJECTIVE:     HPI:    This is a 36 year old female patient,  who presents for her first obstetrical visit.    GOYO: 2022, by Last Menstrual Period.  She is 8w5d weeks.  Her cycles are irregular.  Her last menstrual period was abnormal.   Since her LMP, she has experienced  nausea and fatigue).   She denies emesis, abdominal pain, headache, loss of appetite, vaginal discharge, dysuria, pelvic pain, urinary urgency, lightheadedness, urinary frequency, vaginal bleeding, hemorrhoids and constipation.    Additional History: AMA, HSV-2    Have you travelled during the pregnancy?No  Have your sexual partner(s) travelled during the pregnancy?No        HISTORY:   Planned Pregnancy: No  Marital Status:   Occupation: Customer Service at [employer]  Living in Household: Spouse and Children    Past History:  Her past medical history   Past Medical History:   Diagnosis Date     Herpes    .      She has a history of  post partum hemmorhage-no transfusion 18    Since her last LMP she denies use of alcohol, tobacco and street drugs.    Past medical, surgical, social and family history were reviewed and updated in [facility].        Current Outpatient Medications   Medication     Prenatal Vit-Fe Sulfate-FA-DHA (PRENATAL VITAMIN/MIN +DHA) 27-0.8-200 MG CAPS     No current facility-administered medications for this visit.       ROS:   12 point review of systems negative other than symptoms noted below or in the HPI.  Constitutional: Fatigue  Gastrointestinal: Nausea      OBJECTIVE:     EXAM:  LMP 10/01/2021  Body mass index is 31.62 kg/m .      Nurse phone visit completed. Prenatal book and folder (containing standard educational hand-outs and brochures) will be given at the next visit to patient. Information in folder reviewed over the phone. Questions answered. Brochure given on optional screening available to assess chromosomal anomalies. Pt desires  NIPS. Pt advised to call the clinic if she has any questions or  Relevant Problems No relevant active problems Anesthetic History No history of anesthetic complications Review of Systems / Medical History Patient summary reviewed, nursing notes reviewed and pertinent labs reviewed Pulmonary Within defined limits Neuro/Psych Within defined limits Cardiovascular Within defined limits GI/Hepatic/Renal 
Within defined limits Endo/Other Within defined limits Other Findings Physical Exam 
 
Airway Cardiovascular Regular rate and rhythm,  S1 and S2 normal,  no murmur, click, rub, or gallop Dental 
 
 
  
Pulmonary Breath sounds clear to auscultation Abdominal 
 
 
 
 Other Findings Anesthetic Plan ASA: 1 Anesthesia type: general 
 
 
 
 
Induction: Inhalational 
Anesthetic plan and risks discussed with: Patient and Parent / Janett Whyte concerns related to her pregnancy. Prenatal labs future ordered. New prenatal visit scheduled on 12/20/21 with .      Lab Results   Component Value Date    PAP NIL 04/23/2018     PHQ-9 score:    PHQ 1/14/2019   PHQ-9 Total Score 3   Q9: Thoughts of better off dead/self-harm past 2 weeks Not at all               ZORAIDA-7 SCORE 4/23/2018 1/14/2019   Total Score 8 3             Patient supplied answers from flow sheet for:  Prenatal OB Questionnaire.        Angelica Hoffman RN

## 2021-12-03 NOTE — PROGRESS NOTES
SUBJECTIVE:      HPI:     This is a 36 year old female patient,  who presents for her first obstetrical visit.     GOYO: 2022, by Last Menstrual Period.  She is 11w3d weeks.  Her cycles are irregular.  Her last menstrual period was abnormal.   Since her LMP, she has experienced  nausea and fatigue).   She denies emesis, abdominal pain, headache, loss of appetite, vaginal discharge, dysuria, pelvic pain, urinary urgency, lightheadedness, urinary frequency, vaginal bleeding, hemorrhoids and constipation.     Additional History: Felipa is here for a first OB visit. She is doing well but having nausea throughout the day. She does not want any medication for this however. She is here with Roderick who is 3 years old! Since the last time she was here she  and remarried TC. She denies any intimate partner violence. 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 She is interested in aneuploidy screening.     Have you travelled during the pregnancy?No  Have your sexual partner(s) travelled during the pregnancy?No           HISTORY:   Planned Pregnancy: No  Marital Status:   Occupation: Customer Service at [employer]  Living in Household: Spouse and Children     Past History:  Her past medical history   Past Medical History        Past Medical History:   Diagnosis Date     Herpes        .       She has a history of  post partum hemmorhage-no transfusion 17     Since her last LMP she denies use of alcohol, tobacco and street drugs.     Past medical, surgical, social and family history were reviewed and updated in [facility].               Current Outpatient Medications   Medication     Prenatal Vit-Fe Sulfate-FA-DHA (PRENATAL VITAMIN/MIN +DHA) 27-0.8-200 MG CAPS      No current facility-administered medications for this visit.         ROS:   12 point review of systems negative other than symptoms noted below or in the HPI.  Constitutional: Fatigue  Gastrointestinal: Nausea         OBJECTIVE:     EXAM:  BP  "100/60   Pulse 68   Ht 1.651 m (5' 5\")   Wt 82.9 kg (182 lb 12.8 oz)   LMP 10/01/2021   BMI 30.42 kg/m   Body mass index is 30.42 kg/m .    GENERAL: healthy, alert and no distress  EYES: Eyes grossly normal to inspection, PERRL and conjunctivae and sclerae normal  NECK: no adenopathy, no asymmetry, masses, or scars and thyroid normal to palpation  RESP: lungs clear to auscultation - no rales, rhonchi or wheezes  BREAST: normal without masses, tenderness or nipple discharge and no palpable axillary masses or adenopathy  CV: regular rate and rhythm, normal S1 S2, no S3 or S4, no murmur, click or rub, no peripheral edema and peripheral pulses strong  ABDOMEN: soft, nontender, no hepatosplenomegaly, no masses and bowel sounds normal   (female): normal female external genitalia, normal urethral meatus, vaginal mucosa pink, moist, well rugated, and normal cervix pap smear done.   MS: no gross musculoskeletal defects noted, no edema  SKIN: no suspicious lesions or rashes  NEURO: Normal strength and tone, mentation intact and speech normal  PSYCH: mentation appears normal, affect normal/bright    ASSESSMENT/PLAN:       ICD-10-CM    1. Supervision of high risk pregnancy in first trimester  O09.91 Pap screen with HPV - recommended age 30 - 65 years     Chlamydia trachomatis PCR     Neisseria gonorrhoeae PCR     Urine Culture Aerobic Bacterial     *UA reflex to Microscopic     ABO/Rh type and screen     Hepatitis B surface antigen     HIV Antigen Antibody Combo     Rubella Antibody IgG     Treponema Abs w Reflex to RPR and Titer     Hepatitis C antibody     Non Invasive Prenatal Test Cell Free DNA   2. Screening for cervical cancer  Z12.4 Pap screen with HPV - recommended age 30 - 65 years   3. Need for prophylactic vaccination and inoculation against influenza  Z23 INFLUENZA VACCINE IM > 6 MONTHS VALENT IIV4 (AFLURIA/FLUZONE)       36 year old , On  patient is 11 w 3d of pregnancy with GOYO of " 2022, by Last Menstrual Period consistent with sonogram today    Discussed as follows:SHALOM, can opt for short interval follow up in 2 weeks. Discussed aneuploidy screening and interested in cfDNA. Plan to get AFP in the second trimester. Next three visits will be with partners while I'm on leave. Planning for a repeat  section at 39 weeks with possible bilateral salpingectomy. Will continue to address.    Counseling given:   - Follow up in 2-4 weeks for return OB visit.  - Recommended weight gain for pregnancy: 25-35 lbs.      Jodi Joel MD

## 2021-12-20 ENCOUNTER — PRENATAL OFFICE VISIT (OUTPATIENT)
Dept: OBGYN | Facility: CLINIC | Age: 36
End: 2021-12-20
Payer: COMMERCIAL

## 2021-12-20 ENCOUNTER — ANCILLARY PROCEDURE (OUTPATIENT)
Dept: ULTRASOUND IMAGING | Facility: CLINIC | Age: 36
End: 2021-12-20
Payer: COMMERCIAL

## 2021-12-20 VITALS
WEIGHT: 182.8 LBS | HEIGHT: 65 IN | DIASTOLIC BLOOD PRESSURE: 60 MMHG | SYSTOLIC BLOOD PRESSURE: 100 MMHG | BODY MASS INDEX: 30.46 KG/M2 | HEART RATE: 68 BPM

## 2021-12-20 DIAGNOSIS — Z23 NEED FOR PROPHYLACTIC VACCINATION AND INOCULATION AGAINST INFLUENZA: ICD-10-CM

## 2021-12-20 DIAGNOSIS — O09.91 SUPERVISION OF HIGH RISK PREGNANCY IN FIRST TRIMESTER: Primary | ICD-10-CM

## 2021-12-20 DIAGNOSIS — O23.41 GROUP B STREPTOCOCCUS URINARY TRACT INFECTION AFFECTING PREGNANCY IN FIRST TRIMESTER: ICD-10-CM

## 2021-12-20 DIAGNOSIS — O09.91 SUPERVISION OF HIGH RISK PREGNANCY IN FIRST TRIMESTER: ICD-10-CM

## 2021-12-20 DIAGNOSIS — B95.1 GROUP B STREPTOCOCCUS URINARY TRACT INFECTION AFFECTING PREGNANCY IN FIRST TRIMESTER: ICD-10-CM

## 2021-12-20 DIAGNOSIS — Z12.4 SCREENING FOR CERVICAL CANCER: ICD-10-CM

## 2021-12-20 LAB
ABO/RH(D): NORMAL
ALBUMIN UR-MCNC: NEGATIVE MG/DL
ANTIBODY SCREEN: NEGATIVE
APPEARANCE UR: CLEAR
BILIRUB UR QL STRIP: NEGATIVE
COLOR UR AUTO: YELLOW
GLUCOSE UR STRIP-MCNC: NEGATIVE MG/DL
HGB UR QL STRIP: NEGATIVE
KETONES UR STRIP-MCNC: ABNORMAL MG/DL
LEUKOCYTE ESTERASE UR QL STRIP: NEGATIVE
NITRATE UR QL: NEGATIVE
PH UR STRIP: 6 [PH] (ref 5–7)
SP GR UR STRIP: 1.02 (ref 1–1.03)
SPECIMEN EXPIRATION DATE: NORMAL
UROBILINOGEN UR STRIP-ACNC: 1 E.U./DL

## 2021-12-20 PROCEDURE — 90471 IMMUNIZATION ADMIN: CPT | Performed by: OBSTETRICS & GYNECOLOGY

## 2021-12-20 PROCEDURE — 90686 IIV4 VACC NO PRSV 0.5 ML IM: CPT | Performed by: OBSTETRICS & GYNECOLOGY

## 2021-12-20 PROCEDURE — 76801 OB US < 14 WKS SINGLE FETUS: CPT | Performed by: OBSTETRICS & GYNECOLOGY

## 2021-12-20 PROCEDURE — 81003 URINALYSIS AUTO W/O SCOPE: CPT | Performed by: OBSTETRICS & GYNECOLOGY

## 2021-12-20 PROCEDURE — 86901 BLOOD TYPING SEROLOGIC RH(D): CPT | Performed by: OBSTETRICS & GYNECOLOGY

## 2021-12-20 PROCEDURE — 87624 HPV HI-RISK TYP POOLED RSLT: CPT | Performed by: OBSTETRICS & GYNECOLOGY

## 2021-12-20 PROCEDURE — 86900 BLOOD TYPING SEROLOGIC ABO: CPT | Performed by: OBSTETRICS & GYNECOLOGY

## 2021-12-20 PROCEDURE — 86780 TREPONEMA PALLIDUM: CPT | Performed by: OBSTETRICS & GYNECOLOGY

## 2021-12-20 PROCEDURE — 87086 URINE CULTURE/COLONY COUNT: CPT | Performed by: OBSTETRICS & GYNECOLOGY

## 2021-12-20 PROCEDURE — 86803 HEPATITIS C AB TEST: CPT | Performed by: OBSTETRICS & GYNECOLOGY

## 2021-12-20 PROCEDURE — 87088 URINE BACTERIA CULTURE: CPT | Performed by: OBSTETRICS & GYNECOLOGY

## 2021-12-20 PROCEDURE — 87591 N.GONORRHOEAE DNA AMP PROB: CPT | Performed by: OBSTETRICS & GYNECOLOGY

## 2021-12-20 PROCEDURE — 87491 CHLMYD TRACH DNA AMP PROBE: CPT | Performed by: OBSTETRICS & GYNECOLOGY

## 2021-12-20 PROCEDURE — 87340 HEPATITIS B SURFACE AG IA: CPT | Performed by: OBSTETRICS & GYNECOLOGY

## 2021-12-20 PROCEDURE — 86850 RBC ANTIBODY SCREEN: CPT | Performed by: OBSTETRICS & GYNECOLOGY

## 2021-12-20 PROCEDURE — 86762 RUBELLA ANTIBODY: CPT | Performed by: OBSTETRICS & GYNECOLOGY

## 2021-12-20 PROCEDURE — 36415 COLL VENOUS BLD VENIPUNCTURE: CPT | Performed by: OBSTETRICS & GYNECOLOGY

## 2021-12-20 PROCEDURE — 87389 HIV-1 AG W/HIV-1&-2 AB AG IA: CPT | Performed by: OBSTETRICS & GYNECOLOGY

## 2021-12-20 PROCEDURE — 99207 PR FIRST OB VISIT: CPT | Performed by: OBSTETRICS & GYNECOLOGY

## 2021-12-20 PROCEDURE — G0145 SCR C/V CYTO,THINLAYER,RESCR: HCPCS | Performed by: OBSTETRICS & GYNECOLOGY

## 2021-12-20 ASSESSMENT — MIFFLIN-ST. JEOR: SCORE: 1515.06

## 2021-12-21 LAB
HBV SURFACE AG SERPL QL IA: NONREACTIVE
HCV AB SERPL QL IA: NONREACTIVE
HIV 1+2 AB+HIV1 P24 AG SERPL QL IA: NONREACTIVE
RUBV IGG SERPL QL IA: 2.13 INDEX
RUBV IGG SERPL QL IA: POSITIVE
T PALLIDUM AB SER QL: NONREACTIVE

## 2021-12-22 LAB
BACTERIA UR CULT: ABNORMAL
BACTERIA UR CULT: ABNORMAL
C TRACH DNA SPEC QL NAA+PROBE: NEGATIVE
N GONORRHOEA DNA SPEC QL NAA+PROBE: NEGATIVE

## 2021-12-23 PROBLEM — O23.41 GROUP B STREPTOCOCCUS URINARY TRACT INFECTION AFFECTING PREGNANCY IN FIRST TRIMESTER: Status: ACTIVE | Noted: 2021-12-23

## 2021-12-23 PROBLEM — B95.1 GROUP B STREPTOCOCCUS URINARY TRACT INFECTION AFFECTING PREGNANCY IN FIRST TRIMESTER: Status: ACTIVE | Noted: 2021-12-23

## 2021-12-23 LAB
BKR LAB AP GYN ADEQUACY: NORMAL
BKR LAB AP GYN INTERPRETATION: NORMAL
BKR LAB AP HPV REFLEX: NORMAL
BKR LAB AP PREVIOUS ABNORMAL: NORMAL
PATH REPORT.COMMENTS IMP SPEC: NORMAL
PATH REPORT.COMMENTS IMP SPEC: NORMAL
PATH REPORT.RELEVANT HX SPEC: NORMAL

## 2021-12-23 RX ORDER — AMOXICILLIN 875 MG
875 TABLET ORAL 2 TIMES DAILY
Qty: 14 TABLET | Refills: 0 | Status: SHIPPED | OUTPATIENT
Start: 2021-12-23 | End: 2021-12-30

## 2021-12-27 LAB
HUMAN PAPILLOMA VIRUS 16 DNA: NEGATIVE
HUMAN PAPILLOMA VIRUS 18 DNA: NEGATIVE
HUMAN PAPILLOMA VIRUS FINAL DIAGNOSIS: NORMAL
HUMAN PAPILLOMA VIRUS OTHER HR: NEGATIVE
SCANNED LAB RESULT: NORMAL

## 2022-01-09 ENCOUNTER — HEALTH MAINTENANCE LETTER (OUTPATIENT)
Age: 37
End: 2022-01-09

## 2022-01-17 ENCOUNTER — PRENATAL OFFICE VISIT (OUTPATIENT)
Dept: OBGYN | Facility: CLINIC | Age: 37
End: 2022-01-17
Payer: COMMERCIAL

## 2022-01-17 VITALS — SYSTOLIC BLOOD PRESSURE: 100 MMHG | WEIGHT: 187.4 LBS | BODY MASS INDEX: 31.18 KG/M2 | DIASTOLIC BLOOD PRESSURE: 56 MMHG

## 2022-01-17 DIAGNOSIS — O34.219 PREVIOUS CESAREAN DELIVERY, ANTEPARTUM: ICD-10-CM

## 2022-01-17 DIAGNOSIS — O98.512 HERPES SIMPLEX VIRUS TYPE 2 (HSV-2) INFECTION AFFECTING PREGNANCY IN SECOND TRIMESTER, ANTEPARTUM: ICD-10-CM

## 2022-01-17 DIAGNOSIS — B00.9 HERPES SIMPLEX VIRUS TYPE 2 (HSV-2) INFECTION AFFECTING PREGNANCY IN SECOND TRIMESTER, ANTEPARTUM: ICD-10-CM

## 2022-01-17 DIAGNOSIS — O09.522 HIGH-RISK PREGNANCY, ELDERLY MULTIGRAVIDA IN SECOND TRIMESTER: Primary | ICD-10-CM

## 2022-01-17 DIAGNOSIS — Z36.1 NEED FOR MATERNAL SERUM ALPHA-PROTEIN (MSAFP) SCREENING: ICD-10-CM

## 2022-01-17 PROCEDURE — 36415 COLL VENOUS BLD VENIPUNCTURE: CPT | Performed by: OBSTETRICS & GYNECOLOGY

## 2022-01-17 PROCEDURE — 82105 ALPHA-FETOPROTEIN SERUM: CPT | Mod: 90 | Performed by: OBSTETRICS & GYNECOLOGY

## 2022-01-17 PROCEDURE — 99000 SPECIMEN HANDLING OFFICE-LAB: CPT | Performed by: OBSTETRICS & GYNECOLOGY

## 2022-01-17 PROCEDURE — 99207 PR PRENATAL VISIT: CPT | Performed by: OBSTETRICS & GYNECOLOGY

## 2022-01-17 NOTE — Clinical Note
Seeing this patient while you're out. Wasn't sure when you'd want to do her c/s but now on for 7/6 and EDC 7/8. If you want to do an AM before clinic a bit earlier for patient's benefit you can let asher and patient know once you're back from leave but surgery scheduling order sent just to get ball rolling

## 2022-01-18 ENCOUNTER — TELEPHONE (OUTPATIENT)
Dept: OBGYN | Facility: CLINIC | Age: 37
End: 2022-01-18
Payer: COMMERCIAL

## 2022-01-18 ENCOUNTER — PREP FOR PROCEDURE (OUTPATIENT)
Dept: OBGYN | Facility: CLINIC | Age: 37
End: 2022-01-18
Payer: COMMERCIAL

## 2022-01-18 DIAGNOSIS — O09.523 SUPERVISION OF HIGH RISK ELDERLY MULTIGRAVIDA IN THIRD TRIMESTER: Primary | ICD-10-CM

## 2022-01-18 DIAGNOSIS — O34.219 PREVIOUS CESAREAN DELIVERY, ANTEPARTUM: ICD-10-CM

## 2022-01-18 NOTE — PROGRESS NOTES
Patient normally sees Dr. Joel. Hasn't decided if just seeing one MD until she's back or just whoever is available at this point  Is feeling like fatigue is better. Nausea is much better too. Usually first thing in AM when really sensitive to smells and foods and nothing sounds good but then will eat a little something and usually higher carb and it settles her and is better the rest of the day  Discussed typical HAs from now until 19 weeks and safe otc meds  Is not feeling FM yet.   Is having some sharp pulling in her LLQ and reassured her that is normal for round lig pain, for abdominal wall stretch and tethering from c/s scar stretch and not concerning and not from the SHALOM that was seen at her NOB. Has had no bleeding at all  Fetal heart beat easily heard. Had NIPT and reviewed those results. Discussed AFP and she would like to have that  Patient likely misunderstood ME at last visit b/c thought we had covid booster shots here and was planning to do it today. Informed that do not have them yet, though may in time but strongly encouraged her to look online at retail pharmacies and get it asap. Reviewed the safety and data for both manufacturers and reassured that is the best thing to do and she is agreeable and will do it asap  Referral sent to [facility] for LII as wasn't yet done to do her U/S at 18-20 weeks and then should return here in 4 weeks for her routine next visit  Unsure when exactly repeat c/s would be best for her with MEs schedule but for now send scheduling req for 7/6 which is 39+5 and ME can adjust that if wants once back from leave. Will need to address salpingectomy in next visit

## 2022-01-18 NOTE — TELEPHONE ENCOUNTER
Type of surgery: RPT CS  Location of surgery: [facility]  Date and time of surgery: 7/6/2022 7:15a  Surgeon: SAMREEN Thomas  Pre-Op Appt Date: HOSPITAL  Post-Op Appt Date: 8/16/2022 8:30a   Packet sent out: MAILED 1/18/2022  Pre-cert/Authorization completed:  TBD  Date: 1/18/2022 Hans ray/Anu BARTON TEST TBD    Anu Buckley  Surgery Scheduler        ERAS BAG GIVEN No  ERAS INSTRUCTIONS EXPLAINED N/A    ASSIST: YES    H&P TO BE COMPLETED BY:   Surgeon  FOR H&P TO BE DONE BY SURGEON   UPDATE VISIT ON DATE AT HOSPITAL  SAME DAY/OBSERVATION/INPATIENT: ADMIT  EQUIPMENT: ROUTINE  VENDOR NEEDED AT CASE: NA  IF IUD WHAT BRAND NA  LABS/SPECIAL INSTRUCTIONS: ROUTINE  TIME OFF WORK: 8 WKS  ESTIMATED DOCTOR TIME NEEDED IN MINUTES 90  POST OP TO BE SCHEDULED AT 6 WEEKS AFTER SX APPOINTMENT LENGTH IN MINUTES 30      Doing this for ME since don't want to delay it for patient. If she wants to move it to something before 39+5 or add tubal she'll let you know when she's back

## 2022-01-19 ENCOUNTER — TRANSCRIBE ORDERS (OUTPATIENT)
Dept: MATERNAL FETAL MEDICINE | Facility: CLINIC | Age: 37
End: 2022-01-19
Payer: COMMERCIAL

## 2022-01-19 DIAGNOSIS — O26.90 PREGNANCY RELATED CONDITION, ANTEPARTUM: Primary | ICD-10-CM

## 2022-01-20 LAB
# FETUSES US: NORMAL
AFP MOM SERPL: 1.26
AFP SERPL-MCNC: 34 NG/ML
AGE - REPORTED: 38 YR
CURRENT SMOKER: NO
FAMILY MEMBER DISEASES HX: NO
GA METHOD: NORMAL
GA: NORMAL WK
IDDM PATIENT QL: NO
INTEGRATED SCN PATIENT-IMP: NORMAL
SPECIMEN DRAWN SERPL: NORMAL

## 2022-01-21 NOTE — RESULT ENCOUNTER NOTE
Felipa,    Your AFP test for spina bifida and conditions like it is coming back with a very low risk of 1 in  5,860, which is great news!    AJ

## 2022-02-07 ENCOUNTER — PRE VISIT (OUTPATIENT)
Dept: MATERNAL FETAL MEDICINE | Facility: CLINIC | Age: 37
End: 2022-02-07
Payer: COMMERCIAL

## 2022-02-14 ENCOUNTER — HOSPITAL ENCOUNTER (OUTPATIENT)
Dept: ULTRASOUND IMAGING | Facility: CLINIC | Age: 37
End: 2022-02-14
Attending: OBSTETRICS & GYNECOLOGY
Payer: COMMERCIAL

## 2022-02-14 ENCOUNTER — OFFICE VISIT (OUTPATIENT)
Dept: MATERNAL FETAL MEDICINE | Facility: CLINIC | Age: 37
End: 2022-02-14
Attending: OBSTETRICS & GYNECOLOGY
Payer: COMMERCIAL

## 2022-02-14 DIAGNOSIS — O09.522 MULTIGRAVIDA OF ADVANCED MATERNAL AGE IN SECOND TRIMESTER: Primary | ICD-10-CM

## 2022-02-14 DIAGNOSIS — O26.90 PREGNANCY RELATED CONDITION, ANTEPARTUM: ICD-10-CM

## 2022-02-14 PROCEDURE — 76811 OB US DETAILED SNGL FETUS: CPT

## 2022-02-14 PROCEDURE — 76811 OB US DETAILED SNGL FETUS: CPT | Mod: 26 | Performed by: OBSTETRICS & GYNECOLOGY

## 2022-02-14 NOTE — PROGRESS NOTES
Please see full imaging report from ViewPoint program under imaging tab.    Ebenezer Simpson MD  Maternal Fetal Medicine       Modified Advancement Flap Text: The defect edges were debeveled with a #15 scalpel blade.  Given the location of the defect, shape of the defect and the proximity to free margins a modified advancement flap was deemed most appropriate.  Using a sterile surgical marker, an appropriate advancement flap was drawn incorporating the defect and placing the expected incisions within the relaxed skin tension lines where possible.    The area thus outlined was incised deep to adipose tissue with a #15 scalpel blade.  The skin margins were undermined to an appropriate distance in all directions utilizing iris scissors.

## 2022-02-15 ENCOUNTER — ALLIED HEALTH/NURSE VISIT (OUTPATIENT)
Dept: NURSING | Facility: CLINIC | Age: 37
End: 2022-02-15
Payer: COMMERCIAL

## 2022-02-15 ENCOUNTER — TELEPHONE (OUTPATIENT)
Dept: OBGYN | Facility: CLINIC | Age: 37
End: 2022-02-15
Payer: COMMERCIAL

## 2022-02-15 DIAGNOSIS — O09.92 SUPERVISION OF HIGH RISK PREGNANCY IN SECOND TRIMESTER: Primary | ICD-10-CM

## 2022-02-15 PROCEDURE — 99207 PR NO CHARGE NURSE ONLY: CPT

## 2022-02-15 NOTE — NURSING NOTE
"Pt here after being kneed in upper abdomen at 19w4d  Slight tenderness of upper abdomen - discussed could be bruising and try Tylenol/ICe or heat and be seen in ER if severe pain or VB.  Denies contractions/cramping, VB or LOF.    FHT's found per doppler x1\" 152-162  Pt feeling reassured and discharged home.    Flory Stafford RN on 2/15/2022 at 3:24 PM    "

## 2022-02-15 NOTE — TELEPHONE ENCOUNTER
19w4d    When son was coming down to lay with her after jumping on bed, he kneed her in the upper abdomen between the umbilicus and the rib cage.  Painful at the time, this morning was less painful, but noted a sharp pain in the upper abdomen when takes a deep breath.  Asking about baby's wellbeing.    She denies son hitting lower abdomen or hitting uterus/baby. Asked 3 times if he hit her uterus and denied.  Denies any cramping/contractions, LOF or VB. Has felt FM prior to event but nothing since event.    Will not take Tylenol for discomfort of upper abdomen but can try Ice or heat.    She will come in for FHT's per doppler for reassurance.    Flory Stafford RN on 2/15/2022 at 2:18 PM

## 2022-02-15 NOTE — TELEPHONE ENCOUNTER
Patient is 19 weeks pregnant. Her son was jumping on the bed last night and accidentally fell on her stomach. It bothered her last night but today a little better.Today it hurts a little when she breathes. Please call her back today

## 2022-02-15 NOTE — LETTER
February 15, 2022      Felipa ZACARIAS [facility]  [id number]  [place] [address]        To Whom It May Concern,      Felipa is a patient of our clinic for the care of her current pregnancy. She was seen in the clinic today at 3:10pm for an appointment to check on her baby's wellbeing.  Thank you for your understanding and cooperation.    Sincerely,    Flory Stafford RN on behalf of Dr. Joel

## 2022-02-21 ENCOUNTER — PRENATAL OFFICE VISIT (OUTPATIENT)
Dept: OBGYN | Facility: CLINIC | Age: 37
End: 2022-02-21
Payer: COMMERCIAL

## 2022-02-21 VITALS — DIASTOLIC BLOOD PRESSURE: 62 MMHG | BODY MASS INDEX: 31.25 KG/M2 | SYSTOLIC BLOOD PRESSURE: 118 MMHG | WEIGHT: 187.8 LBS

## 2022-02-21 DIAGNOSIS — O09.522 HIGH-RISK PREGNANCY, ELDERLY MULTIGRAVIDA IN SECOND TRIMESTER: Primary | ICD-10-CM

## 2022-02-21 DIAGNOSIS — O98.512 HERPES SIMPLEX VIRUS TYPE 2 (HSV-2) INFECTION AFFECTING PREGNANCY IN SECOND TRIMESTER, ANTEPARTUM: ICD-10-CM

## 2022-02-21 DIAGNOSIS — O34.219 PREVIOUS CESAREAN DELIVERY, ANTEPARTUM: ICD-10-CM

## 2022-02-21 DIAGNOSIS — B00.9 HERPES SIMPLEX VIRUS TYPE 2 (HSV-2) INFECTION AFFECTING PREGNANCY IN SECOND TRIMESTER, ANTEPARTUM: ICD-10-CM

## 2022-02-21 PROCEDURE — 99207 PR PRENATAL VISIT: CPT | Performed by: OBSTETRICS & GYNECOLOGY

## 2022-02-22 NOTE — PROGRESS NOTES
Had her MFM scan one week ago and all anatomy was normal with anterior but not low lying placenta   No signs of accreta seen on U/S and reviewed placentation risks with ant placenta in setting of c/s x2 in past  Patient was scheduled for a RLTCS with Dr. irleand on 7/6 which is 39+5 by her LMP and EDC and by U/S in 1st tri was edc of 5/3  When Dr. RG rtns from leave she can look at her schedule and reassess if wants to offer 7/1 c/s or some other date before 7/6 but son was 40 weeks most recently on repeat c/s  Will need to address salpingectomy with patient in near future as well  Feeling lots of FM. Gets some tightening if stands for too long cooking, etc but nothing regular or painful  No other concerns. Weight is still about 3# below starting weight but stable for the last 5 weeks and eating fine and appetite is normal  Return 4 weeks.

## 2022-03-14 ENCOUNTER — HOSPITAL ENCOUNTER (OUTPATIENT)
Facility: CLINIC | Age: 37
LOS: 1 days | Discharge: HOME | End: 2022-03-14
Attending: OBSTETRICS & GYNECOLOGY | Admitting: OBSTETRICS & GYNECOLOGY
Payer: COMMERCIAL

## 2022-03-14 ENCOUNTER — TELEPHONE (OUTPATIENT)
Dept: OBGYN | Facility: CLINIC | Age: 37
End: 2022-03-14
Payer: COMMERCIAL

## 2022-03-14 ENCOUNTER — NURSE TRIAGE (OUTPATIENT)
Dept: NURSING | Facility: CLINIC | Age: 37
End: 2022-03-14
Payer: COMMERCIAL

## 2022-03-14 VITALS
DIASTOLIC BLOOD PRESSURE: 52 MMHG | TEMPERATURE: 98.1 F | BODY MASS INDEX: 30.82 KG/M2 | WEIGHT: 185 LBS | HEART RATE: 73 BPM | SYSTOLIC BLOOD PRESSURE: 98 MMHG | RESPIRATION RATE: 16 BRPM | HEIGHT: 65 IN

## 2022-03-14 PROBLEM — N93.9 VAGINAL BLEEDING: Status: ACTIVE | Noted: 2022-03-14

## 2022-03-14 LAB
ALBUMIN UR-MCNC: 10 MG/DL
APPEARANCE UR: CLEAR
BACTERIA #/AREA URNS HPF: ABNORMAL /HPF
BILIRUB UR QL STRIP: NEGATIVE
CLUE CELLS: NORMAL
COLOR UR AUTO: YELLOW
GLUCOSE UR STRIP-MCNC: NEGATIVE MG/DL
HGB UR QL STRIP: ABNORMAL
KETONES UR STRIP-MCNC: 20 MG/DL
LEUKOCYTE ESTERASE UR QL STRIP: NEGATIVE
MUCOUS THREADS #/AREA URNS LPF: PRESENT /LPF
NITRATE UR QL: NEGATIVE
PH UR STRIP: 7.5 [PH] (ref 5–7)
RBC URINE: 1 /HPF
SP GR UR STRIP: 1.02 (ref 1–1.03)
SQUAMOUS EPITHELIAL: 1 /HPF
TRICHOMONAS, WET PREP: NORMAL
UROBILINOGEN UR STRIP-MCNC: NORMAL MG/DL
WBC URINE: 1 /HPF
WBC'S/HIGH POWER FIELD, WET PREP: NORMAL
YEAST, WET PREP: NORMAL

## 2022-03-14 PROCEDURE — G0463 HOSPITAL OUTPT CLINIC VISIT: HCPCS

## 2022-03-14 PROCEDURE — 87210 SMEAR WET MOUNT SALINE/INK: CPT | Performed by: OBSTETRICS & GYNECOLOGY

## 2022-03-14 PROCEDURE — 81001 URINALYSIS AUTO W/SCOPE: CPT | Performed by: OBSTETRICS & GYNECOLOGY

## 2022-03-14 RX ORDER — PROCHLORPERAZINE MALEATE 5 MG
10 TABLET ORAL EVERY 6 HOURS PRN
Status: DISCONTINUED | OUTPATIENT
Start: 2022-03-14 | End: 2022-03-14 | Stop reason: HOSPADM

## 2022-03-14 RX ORDER — ONDANSETRON 4 MG/1
4 TABLET, ORALLY DISINTEGRATING ORAL EVERY 6 HOURS PRN
Status: DISCONTINUED | OUTPATIENT
Start: 2022-03-14 | End: 2022-03-14 | Stop reason: HOSPADM

## 2022-03-14 RX ORDER — ONDANSETRON 2 MG/ML
4 INJECTION INTRAMUSCULAR; INTRAVENOUS EVERY 6 HOURS PRN
Status: DISCONTINUED | OUTPATIENT
Start: 2022-03-14 | End: 2022-03-14 | Stop reason: HOSPADM

## 2022-03-14 RX ORDER — METOCLOPRAMIDE HYDROCHLORIDE 5 MG/ML
10 INJECTION INTRAMUSCULAR; INTRAVENOUS EVERY 6 HOURS PRN
Status: DISCONTINUED | OUTPATIENT
Start: 2022-03-14 | End: 2022-03-14 | Stop reason: HOSPADM

## 2022-03-14 RX ORDER — METOCLOPRAMIDE 10 MG/1
10 TABLET ORAL EVERY 6 HOURS PRN
Status: DISCONTINUED | OUTPATIENT
Start: 2022-03-14 | End: 2022-03-14 | Stop reason: HOSPADM

## 2022-03-14 RX ORDER — PROCHLORPERAZINE 25 MG
25 SUPPOSITORY, RECTAL RECTAL EVERY 12 HOURS PRN
Status: DISCONTINUED | OUTPATIENT
Start: 2022-03-14 | End: 2022-03-14 | Stop reason: HOSPADM

## 2022-03-14 NOTE — PLAN OF CARE
" 23+3 weeks pregnant pt presents to [facility] for eval after having some \"bright red\" vaginal bleeding this morning. Pt denies any further bleeding since this morning's episode. Pt reports having intercourse 2 days ago. Admisson assessment completed in [facility]. [facility]. Dr. Boyd paged @[phone]; called back @8380; updated; orders received for wet prep, urine, and spec exam to look at cervix.   "

## 2022-03-14 NOTE — DISCHARGE INSTRUCTIONS
Discharge Instruction for Undelivered Patients      You were seen for: Bleeding Assessment  We Consulted: Dr. Mini Boyd  You had (Test or Medicine):External fetal and uterine monitoring     Diet:   Drink 8 to 12 glasses of liquids (milk, juice, water) every day.  You may eat meals and snacks.     Activity:  Rest the pelvic area. No sex. Do not stimulate breasts or nipples.     Call your provider if you notice:  Swelling in your face or increased swelling in your hands or legs.  Headaches that are not relieved by Tylenol (acetaminophen).  Changes in your vision (blurring: seeing spots or stars.)  Nausea (sick to your stomach) and vomiting (throwing up).   Weight gain of 5 pounds or more per week.  Heartburn that doesn't go away.  Signs of bladder infection: pain when you urinate (use the toilet), need to go more often and more urgently.  The bag of garza (rupture of membranes) breaks, or you notice leaking in your underwear.  Bright red blood in your underwear.  Abdominal (lower belly) or stomach pain.  Second (plus) baby: Contractions (tightening) less than 10 minutes apart and getting stronger.  *If less than 34 weeks: Contractions (tightening) more than 6 times in one hour.  Increase or change in vaginal discharge (note the color and amount)  Other: No work on Tuesday, can return to work on Wednesday    Follow-up:  As scheduled in the clinic

## 2022-03-14 NOTE — PROVIDER NOTIFICATION
03/14/22 1245   Provider Notification   Provider Name/Title Dr. Mini Boyd   Method of Notification Phone   Request Evaluate - Remote   Notification Reason Status Update;Bleeding;Pain;Uterine Activity;Lab/Diagnostic Study   Will watch for 2 hours, can do just toco.

## 2022-03-14 NOTE — TELEPHONE ENCOUNTER
"23w3d    Arrived in clinic with 3 other people  She had spoke to Milady MALCOLM over the phone just 10\" ago and Milady informed her she would call back after speaking to Dr Dickson for direction.  Pt arrived at clinic unannounced. She stated she is now \"having a lot of pain\" in lower abdomen. Unsure if further bleeding.    Instructed pt to go to [facility] for proper immediate evaluation. Pt will go there directly.    Called [facility] and notified pt coming. Dr. Boyd on call.    Flory Stafford RN on 3/14/2022 at 8:57 AM        "

## 2022-03-14 NOTE — TELEPHONE ENCOUNTER
Pt is calling.    Please talk to MD and call patient back to advise. ASAP.    OB Triage Call      Is patient's OB/Midwife with the [facility] or [facility]? [facility]- Proceed with triage     Reason for call: 23 wks 3 days pregnant with cramping a few minutes ago. Now just noticed that she is having vaginal bleeding.    Assessment: Last fetal movement was at 4:00am today. Now with vaginal bleeding that is light to regular menses. Denies pain now or LOF.  Juliette 2 days ago. No recent vaginal exams.    Plan: Will route to clinic for RN to talk to MD and then call pt back to advise as next steps.    Patient plans to deliver at [facility]    Patient's primary OB Provider is Dr Joel, Dr Dickson.    Per protocol recommendations Consult needed for [facility] MD or Midwife.  Patient's primary OB is [facility].  On-Call provider Called not paged at 7:58am. Call returned at N/A message sent and advised on triage assessment.  Provider recommendations: Unknown. Clinic to follow up.    Is patient's delivering hospital on divert? Does not apply due to Provider will contact patient to develop plan of care      23w3d    Estimated Date of Delivery: 2022        OB History    Para Term  AB Living   3 2 2 0 0 2   SAB IAB Ectopic Multiple Live Births   0 0 0 0 2      # Outcome Date GA Lbr [place]/2nd Weight Sex Delivery Anes PTL Lv   3 Current            2 Term 18 38w2d  3.05 kg (6 lb 11.6 oz) M CS-LTranv EPI  CHERY      Complications: Other Excessive Bleeding, Cephalopelvic Disproportion      Name: Roderick      Apgar1: 8     1 Term 03 40w0d  3.26 kg (7 lb 3 oz) F CS-LTranv   CHERY      Birth Comments: epidural      Name: Lissett       Lab Results   Component Value Date    GBS Negative 2018          Amelie Figueroa 22 7:46 AM  [facility] Nurse Advisor    Reason for Disposition    MILD-MODERATE vaginal bleeding (e.g., small to medium clots; like mild menstrual period) (Exception: Single  episode after sexual intercourse or pelvic exam)    Additional Information    Negative: Passed out (i.e., fainted, collapsed and was not responding)    Negative: Shock suspected (e.g., cold/pale/clammy skin, too weak to stand, low BP, rapid pulse)    Negative: Difficult to awaken or acting confused (e.g., disoriented, slurred speech)    Negative: SEVERE vaginal bleeding (e.g., continuous red blood from vagina, or large blood clots)    Negative: SEVERE constant abdominal pain (e.g., excruciating) and present > 1 hour    Negative: Sounds like a life-threatening emergency to the triager    Negative: Vaginal bleeding and pregnant < 20 weeks    Protocols used: PREGNANCY - VAGINAL BLEEDING GREATER THAN 20 WEEKS EGA-A-OH    Amelie Figueroa RN  [facility] Nurse Advisor  3/14/2022 at 8:05 AM

## 2022-03-15 NOTE — PLAN OF CARE
Speculum used to look for bleeding in vaginal vault and on cervix. No bright red blood seen, some old brown blood seen. Wet prep obtained and sent to lab. Patient still c/o cramping but it has gotten less throughout the day.

## 2022-03-15 NOTE — PROVIDER NOTIFICATION
03/14/22 1515   Provider Notification   Provider Name/Title Dr. Mini Boyd   Method of Notification Phone   Request Evaluate - Remote   Notification Reason Status Update   update given, will discharge to home

## 2022-03-21 ENCOUNTER — PRENATAL OFFICE VISIT (OUTPATIENT)
Dept: OBGYN | Facility: CLINIC | Age: 37
End: 2022-03-21
Payer: COMMERCIAL

## 2022-03-21 VITALS — DIASTOLIC BLOOD PRESSURE: 56 MMHG | BODY MASS INDEX: 30.82 KG/M2 | WEIGHT: 185.2 LBS | SYSTOLIC BLOOD PRESSURE: 112 MMHG

## 2022-03-21 DIAGNOSIS — O09.522 HIGH-RISK PREGNANCY, ELDERLY MULTIGRAVIDA IN SECOND TRIMESTER: Primary | ICD-10-CM

## 2022-03-21 DIAGNOSIS — O98.512 HERPES SIMPLEX VIRUS TYPE 2 (HSV-2) INFECTION AFFECTING PREGNANCY IN SECOND TRIMESTER, ANTEPARTUM: ICD-10-CM

## 2022-03-21 DIAGNOSIS — B00.9 HERPES SIMPLEX VIRUS TYPE 2 (HSV-2) INFECTION AFFECTING PREGNANCY IN SECOND TRIMESTER, ANTEPARTUM: ICD-10-CM

## 2022-03-21 DIAGNOSIS — O46.92 VAGINAL BLEEDING IN PREGNANCY, SECOND TRIMESTER: ICD-10-CM

## 2022-03-21 DIAGNOSIS — O34.219 PREVIOUS CESAREAN DELIVERY, ANTEPARTUM: ICD-10-CM

## 2022-03-21 PROCEDURE — 99207 PR PRENATAL VISIT: CPT | Performed by: OBSTETRICS & GYNECOLOGY

## 2022-04-11 DIAGNOSIS — Z23 NEED FOR TDAP VACCINATION: ICD-10-CM

## 2022-04-11 DIAGNOSIS — Z36.9 ENCOUNTER FOR ANTENATAL SCREENING OF MOTHER: Primary | ICD-10-CM

## 2022-04-18 ENCOUNTER — LAB (OUTPATIENT)
Dept: LAB | Facility: CLINIC | Age: 37
End: 2022-04-18
Payer: COMMERCIAL

## 2022-04-18 ENCOUNTER — PRENATAL OFFICE VISIT (OUTPATIENT)
Dept: OBGYN | Facility: CLINIC | Age: 37
End: 2022-04-18
Payer: COMMERCIAL

## 2022-04-18 VITALS — SYSTOLIC BLOOD PRESSURE: 116 MMHG | WEIGHT: 188 LBS | DIASTOLIC BLOOD PRESSURE: 66 MMHG | BODY MASS INDEX: 31.28 KG/M2

## 2022-04-18 DIAGNOSIS — O09.91 SUPERVISION OF HIGH RISK PREGNANCY IN FIRST TRIMESTER: ICD-10-CM

## 2022-04-18 DIAGNOSIS — Z23 NEED FOR TDAP VACCINATION: ICD-10-CM

## 2022-04-18 DIAGNOSIS — R73.02 IMPAIRED GLUCOSE TOLERANCE: Primary | ICD-10-CM

## 2022-04-18 DIAGNOSIS — Z36.9 ENCOUNTER FOR ANTENATAL SCREENING OF MOTHER: ICD-10-CM

## 2022-04-18 LAB
ERYTHROCYTE [DISTWIDTH] IN BLOOD BY AUTOMATED COUNT: 12.7 % (ref 10–15)
GLUCOSE 1H P 50 G GLC PO SERPL-MCNC: 155 MG/DL (ref 70–129)
HCT VFR BLD AUTO: 35.8 % (ref 35–47)
HGB BLD-MCNC: 11.7 G/DL (ref 11.7–15.7)
MCH RBC QN AUTO: 29.8 PG (ref 26.5–33)
MCHC RBC AUTO-ENTMCNC: 32.7 G/DL (ref 31.5–36.5)
MCV RBC AUTO: 91 FL (ref 78–100)
PLATELET # BLD AUTO: 204 10E3/UL (ref 150–450)
RBC # BLD AUTO: 3.92 10E6/UL (ref 3.8–5.2)
WBC # BLD AUTO: 9.7 10E3/UL (ref 4–11)

## 2022-04-18 PROCEDURE — 82950 GLUCOSE TEST: CPT

## 2022-04-18 PROCEDURE — 85027 COMPLETE CBC AUTOMATED: CPT

## 2022-04-18 PROCEDURE — 90715 TDAP VACCINE 7 YRS/> IM: CPT | Performed by: OBSTETRICS & GYNECOLOGY

## 2022-04-18 PROCEDURE — 36415 COLL VENOUS BLD VENIPUNCTURE: CPT

## 2022-04-18 PROCEDURE — 90471 IMMUNIZATION ADMIN: CPT | Performed by: OBSTETRICS & GYNECOLOGY

## 2022-04-18 PROCEDURE — 99207 PR PRENATAL VISIT: CPT | Performed by: OBSTETRICS & GYNECOLOGY

## 2022-04-18 NOTE — PROGRESS NOTES
Doing well. Tired at the end of the day. No more vaginal bleeding noted. Does not want any more children after this. She has an 18 year old a 3+ year old and this baby on the way. We signed the federal tubal consent but she is not 100% about it yet so it won't be added to the procedure. Will leave it scheduled on 7/6 for now knowing she will likely be delivered prior to that. Accepts TDAP today. Glucola and CBC today.  Jodi Joel MD

## 2022-04-18 NOTE — NURSING NOTE
Prior to immunization administration, verified patients identity using patient s name and date of birth. Please see Immunization Activity for additional information.     Screening Questionnaire for Adult Immunization    Are you sick today?   No   Do you have allergies to medications, food, a vaccine component or latex?   No   Have you ever had a serious reaction after receiving a vaccination?   No   Do you have a long-term health problem with heart, lung, kidney, or metabolic disease (e.g., diabetes), asthma, a blood disorder, no spleen, complement component deficiency, a cochlear implant, or a spinal fluid leak?  Are you on long-term aspirin therapy?   No   Do you have cancer, leukemia, HIV/AIDS, or any other immune system problem?   No   Do you have a parent, brother, or sister with an immune system problem?   No   In the past 3 months, have you taken medications that affect  your immune system, such as prednisone, other steroids, or anticancer drugs; drugs for the treatment of rheumatoid arthritis, Crohn s disease, or psoriasis; or have you had radiation treatments?   No   Have you had a seizure, or a brain or other nervous system problem?   No   During the past year, have you received a transfusion of blood or blood    products, or been given immune (gamma) globulin or antiviral drug?   No   For women: Are you pregnant or is there a chance you could become       pregnant during the next month?   Yes   Have you received any vaccinations in the past 4 weeks?   No     Immunization questionnaire answers were all negative.        Per orders of   injection of TDAP given by Daksha Fuentes CMA. Patient instructed to remain in clinic for 15 minutes afterwards, and to report any adverse reaction to me immediately.       Screening performed by Daksha Fuentes CMA on 4/18/2022 at 12:25 PM.

## 2022-04-25 ENCOUNTER — LAB (OUTPATIENT)
Dept: LAB | Facility: CLINIC | Age: 37
End: 2022-04-25
Payer: COMMERCIAL

## 2022-04-25 ENCOUNTER — TELEPHONE (OUTPATIENT)
Dept: OBGYN | Facility: CLINIC | Age: 37
End: 2022-04-25
Payer: COMMERCIAL

## 2022-04-25 DIAGNOSIS — O09.523 SUPERVISION OF HIGH RISK ELDERLY MULTIGRAVIDA IN THIRD TRIMESTER: Primary | ICD-10-CM

## 2022-04-25 DIAGNOSIS — R73.02 IMPAIRED GLUCOSE TOLERANCE: ICD-10-CM

## 2022-04-25 LAB
GESTATIONAL GTT 1 HR POST DOSE: 183 MG/DL (ref 60–179)
GLUCOSE P FAST SERPL-MCNC: 94 MG/DL (ref 60–94)

## 2022-04-25 PROCEDURE — 99207 PR NO CHARGE NURSE ONLY: CPT | Performed by: OBSTETRICS & GYNECOLOGY

## 2022-04-25 PROCEDURE — 82952 GTT-ADDED SAMPLES: CPT

## 2022-04-25 PROCEDURE — 82951 GLUCOSE TOLERANCE TEST (GTT): CPT

## 2022-04-25 PROCEDURE — 36415 COLL VENOUS BLD VENIPUNCTURE: CPT

## 2022-04-25 NOTE — TELEPHONE ENCOUNTER
Pt here for 3hr, requested to speak with a nurse in regards to some symptoms she had after the tdap.    29w3d,   22: [place]  Tdap given.     Pt states the next day she developed a low grade fever, headache, and was overall feeling unwell.  Arm was noted to be very tender, bruised, and was swollen. Discussed how those can be normal side effects after vaccinations.    Pt feeling better today, bruising is almost completely resolved at this time, area is still redden around the site. RN marked it with a pen. Small hematoma noted which is tender to the touch.    Area of injection was noted to be given further down the arm (bicep area) vs in the deltoid muscle.    Encouraged pt to place cool compresses to the are for 15-20 minutes 3-4x/day. She can use tylenol for any discomfort. Benedryl in the evenings as that may make her drowsy.  Pt to be seen with any worsening symptoms, the reddened area spreads outside of the marked area, and/or if she were to develop a fever pt to proceed to urgent care at that time.    Routing pt [employer] message to provider as CARLINE Knox RN on 2022 at 10:55 AM

## 2022-04-26 ENCOUNTER — TELEPHONE (OUTPATIENT)
Dept: OBGYN | Facility: CLINIC | Age: 37
End: 2022-04-26
Payer: COMMERCIAL

## 2022-04-26 LAB
GESTATIONAL GTT 2 HR POST DOSE: 153 MG/DL (ref 60–154)
GESTATIONAL GTT 3 HR POST DOSE: 105 MG/DL (ref 60–139)

## 2022-04-26 NOTE — TELEPHONE ENCOUNTER
Referred pt to Dr Saldana response to her 3hr glucose results - pt did not see it and now sees it and will read it at length.  A brief synopsis was given and made pt aware that Dr Saldana recommended she watch her diet as indicated in her message but overall she has passed her 3hr GTT.    Pt verbalized understanding, in agreement with plan, and voiced no further questions.  Flory Stafford RN on 4/26/2022 at 1:43 PM

## 2022-04-26 NOTE — TELEPHONE ENCOUNTER
Spoke to Taniya to change assist back to Deb    Spoke to Anu and changed assist to s    Aun Buckley  Surgery Scheduler

## 2022-04-26 NOTE — RESULT ENCOUNTER NOTE
Felipa,    So you officially did pass your diabetes test. You have to fail 2 of the 4 values to be diabetic and you only had one that was abnormal on the one hour draw. However your fasting was 94 and that is the cut off of normal, and 2 hours after you were at 153 and the cut off is 154 so you were definitely close.    Though you don't have to live like you do have diabetes the rest of the pregnancy, you do show signs of insulin resistance meaning your blood sugars may run a bit high intermittenly though not consistently. So as to not have issues for the baby of getting too big, or too much amniotic fluid or things that typically affect a true diabetic, Id still advise you to be really careful about your carbs/sweets/bread/rice/tortillas/etc. Also even 20 minutes of walking 5x/week has really been shown to help with these things. So just try to be aware of those things even though officially you did pass your test!    Coby Dickson MD

## 2022-05-09 ENCOUNTER — PRENATAL OFFICE VISIT (OUTPATIENT)
Dept: OBGYN | Facility: CLINIC | Age: 37
End: 2022-05-09
Payer: COMMERCIAL

## 2022-05-09 VITALS — SYSTOLIC BLOOD PRESSURE: 108 MMHG | DIASTOLIC BLOOD PRESSURE: 56 MMHG | BODY MASS INDEX: 31.62 KG/M2 | WEIGHT: 190 LBS

## 2022-05-09 DIAGNOSIS — O09.523 SUPERVISION OF HIGH RISK ELDERLY MULTIGRAVIDA IN THIRD TRIMESTER: Primary | ICD-10-CM

## 2022-05-09 PROCEDURE — 99207 PR PRENATAL VISIT: CPT | Performed by: OBSTETRICS & GYNECOLOGY

## 2022-05-09 NOTE — PROGRESS NOTES
Prenatal Visit: doing well. Discussed her glucose tolerance results. She had a coffee and a muffin with the 1 hr but was truly fasting with the 3 hour. We discussed that she has decreased glucose tolerance and was borderline on 2 of the normal results. She was offered a nutritional consult and declined. We discussed ways of modifying her diet.  RTC in 2 weeks  Jodi Joel MD

## 2022-05-23 ENCOUNTER — PRENATAL OFFICE VISIT (OUTPATIENT)
Dept: OBGYN | Facility: CLINIC | Age: 37
End: 2022-05-23
Payer: COMMERCIAL

## 2022-05-23 VITALS — SYSTOLIC BLOOD PRESSURE: 102 MMHG | WEIGHT: 191 LBS | DIASTOLIC BLOOD PRESSURE: 56 MMHG | BODY MASS INDEX: 31.78 KG/M2

## 2022-05-23 DIAGNOSIS — O09.523 SUPERVISION OF HIGH RISK ELDERLY MULTIGRAVIDA IN THIRD TRIMESTER: Primary | ICD-10-CM

## 2022-05-23 PROCEDURE — 99207 PR PRENATAL VISIT: CPT | Performed by: OBSTETRICS & GYNECOLOGY

## 2022-05-23 NOTE — PROGRESS NOTES
Prenatal Visit: doing well. Good fetal movement. Intermittent contractions but not consistent.  labor precautions given. Tubal papers signed but Felipa is not 100% set on getting a salpingectomy. Reassured that we will clarify prior to her  section.  RTC in 2 weeks  Jodi Joel MD

## 2022-06-06 ENCOUNTER — PRENATAL OFFICE VISIT (OUTPATIENT)
Dept: OBGYN | Facility: CLINIC | Age: 37
End: 2022-06-06
Payer: COMMERCIAL

## 2022-06-06 VITALS — WEIGHT: 193 LBS | BODY MASS INDEX: 32.12 KG/M2 | DIASTOLIC BLOOD PRESSURE: 60 MMHG | SYSTOLIC BLOOD PRESSURE: 108 MMHG

## 2022-06-06 DIAGNOSIS — R73.09 IMPAIRED GLUCOSE TOLERANCE TEST: ICD-10-CM

## 2022-06-06 DIAGNOSIS — O09.523 SUPERVISION OF HIGH RISK ELDERLY MULTIGRAVIDA IN THIRD TRIMESTER: Primary | ICD-10-CM

## 2022-06-06 PROCEDURE — 99207 PR PRENATAL VISIT: CPT | Performed by: OBSTETRICS & GYNECOLOGY

## 2022-06-06 NOTE — PROGRESS NOTES
Prenatal Visit: discussed glucose tolerance results being borderline and need for continued vigilance. S>D will get a growth sonogram. Plan on GBS at the next visit  RTC in 1 week.  Jodi Joel MD

## 2022-06-14 ENCOUNTER — PRENATAL OFFICE VISIT (OUTPATIENT)
Dept: OBGYN | Facility: CLINIC | Age: 37
End: 2022-06-14
Payer: COMMERCIAL

## 2022-06-14 VITALS — BODY MASS INDEX: 32.45 KG/M2 | SYSTOLIC BLOOD PRESSURE: 108 MMHG | DIASTOLIC BLOOD PRESSURE: 68 MMHG | WEIGHT: 195 LBS

## 2022-06-14 DIAGNOSIS — O09.523 SUPERVISION OF HIGH RISK ELDERLY MULTIGRAVIDA IN THIRD TRIMESTER: Primary | ICD-10-CM

## 2022-06-14 PROCEDURE — 99207 PR PRENATAL VISIT: CPT | Performed by: OBSTETRICS & GYNECOLOGY

## 2022-06-14 NOTE — PROGRESS NOTES
Prenatal Visit: Doing well. Overall fetal movement is reassuring to her but is decreased from what she was used to before. She cancelled the sonogram today due to her partner's unavailability. She is no longer interested in a tubal sterilization. It was never added to the procedure but a consent was signed. Will not plan to perform this at the time of her repeat  section. She has GBS bacteruria therefore a swab was not done. She has increased lower extremity edema but her blood pressures have been normal.   RTC in one week  Jodi Joel MD

## 2022-06-20 ENCOUNTER — HOSPITAL ENCOUNTER (INPATIENT)
Facility: CLINIC | Age: 37
LOS: 2 days | Discharge: HOME HEALTH | End: 2022-06-22
Attending: OBSTETRICS & GYNECOLOGY | Admitting: OBSTETRICS & GYNECOLOGY
Payer: COMMERCIAL

## 2022-06-20 ENCOUNTER — NURSE TRIAGE (OUTPATIENT)
Dept: NURSING | Facility: CLINIC | Age: 37
End: 2022-06-20

## 2022-06-20 ENCOUNTER — TELEPHONE (OUTPATIENT)
Dept: OBGYN | Facility: CLINIC | Age: 37
End: 2022-06-20

## 2022-06-20 ENCOUNTER — ANESTHESIA (OUTPATIENT)
Dept: OBGYN | Facility: CLINIC | Age: 37
End: 2022-06-20
Payer: COMMERCIAL

## 2022-06-20 ENCOUNTER — ANESTHESIA EVENT (OUTPATIENT)
Dept: OBGYN | Facility: CLINIC | Age: 37
End: 2022-06-20
Payer: COMMERCIAL

## 2022-06-20 LAB
ABO/RH(D): NORMAL
ANTIBODY SCREEN: NEGATIVE
ERYTHROCYTE [DISTWIDTH] IN BLOOD BY AUTOMATED COUNT: 13.1 % (ref 10–15)
HCT VFR BLD AUTO: 34.8 % (ref 35–47)
HGB BLD-MCNC: 11.3 G/DL (ref 11.7–15.7)
MCH RBC QN AUTO: 29 PG (ref 26.5–33)
MCHC RBC AUTO-ENTMCNC: 32.5 G/DL (ref 31.5–36.5)
MCV RBC AUTO: 89 FL (ref 78–100)
PLATELET # BLD AUTO: 205 10E3/UL (ref 150–450)
RBC # BLD AUTO: 3.9 10E6/UL (ref 3.8–5.2)
SARS-COV-2 RNA RESP QL NAA+PROBE: NEGATIVE
SPECIMEN EXPIRATION DATE: NORMAL
T PALLIDUM AB SER QL: NONREACTIVE
WBC # BLD AUTO: 9.2 10E3/UL (ref 4–11)

## 2022-06-20 PROCEDURE — 258N000003 HC RX IP 258 OP 636: Performed by: OBSTETRICS & GYNECOLOGY

## 2022-06-20 PROCEDURE — 86780 TREPONEMA PALLIDUM: CPT | Performed by: OBSTETRICS & GYNECOLOGY

## 2022-06-20 PROCEDURE — 710N000009 HC RECOVERY PHASE 1, LEVEL 1, PER MIN: Performed by: OBSTETRICS & GYNECOLOGY

## 2022-06-20 PROCEDURE — 250N000013 HC RX MED GY IP 250 OP 250 PS 637: Performed by: OBSTETRICS & GYNECOLOGY

## 2022-06-20 PROCEDURE — 250N000011 HC RX IP 250 OP 636: Performed by: NURSE ANESTHETIST, CERTIFIED REGISTERED

## 2022-06-20 PROCEDURE — 250N000011 HC RX IP 250 OP 636: Performed by: OBSTETRICS & GYNECOLOGY

## 2022-06-20 PROCEDURE — 258N000003 HC RX IP 258 OP 636: Performed by: NURSE ANESTHETIST, CERTIFIED REGISTERED

## 2022-06-20 PROCEDURE — C9803 HOPD COVID-19 SPEC COLLECT: HCPCS

## 2022-06-20 PROCEDURE — 86850 RBC ANTIBODY SCREEN: CPT | Performed by: OBSTETRICS & GYNECOLOGY

## 2022-06-20 PROCEDURE — 250N000013 HC RX MED GY IP 250 OP 250 PS 637

## 2022-06-20 PROCEDURE — 59510 CESAREAN DELIVERY: CPT | Performed by: OBSTETRICS & GYNECOLOGY

## 2022-06-20 PROCEDURE — 250N000009 HC RX 250: Performed by: NURSE ANESTHETIST, CERTIFIED REGISTERED

## 2022-06-20 PROCEDURE — 360N000076 HC SURGERY LEVEL 3, PER MIN: Performed by: OBSTETRICS & GYNECOLOGY

## 2022-06-20 PROCEDURE — 59025 FETAL NON-STRESS TEST: CPT

## 2022-06-20 PROCEDURE — 370N000017 HC ANESTHESIA TECHNICAL FEE, PER MIN: Performed by: OBSTETRICS & GYNECOLOGY

## 2022-06-20 PROCEDURE — 272N000001 HC OR GENERAL SUPPLY STERILE: Performed by: OBSTETRICS & GYNECOLOGY

## 2022-06-20 PROCEDURE — G0463 HOSPITAL OUTPT CLINIC VISIT: HCPCS | Mod: 25

## 2022-06-20 PROCEDURE — 120N000012 HC R&B POSTPARTUM

## 2022-06-20 PROCEDURE — 36415 COLL VENOUS BLD VENIPUNCTURE: CPT | Performed by: OBSTETRICS & GYNECOLOGY

## 2022-06-20 PROCEDURE — U0005 INFEC AGEN DETEC AMPLI PROBE: HCPCS | Performed by: OBSTETRICS & GYNECOLOGY

## 2022-06-20 PROCEDURE — 85027 COMPLETE CBC AUTOMATED: CPT | Performed by: OBSTETRICS & GYNECOLOGY

## 2022-06-20 RX ORDER — HYDROCORTISONE 25 MG/G
CREAM TOPICAL 3 TIMES DAILY PRN
Status: DISCONTINUED | OUTPATIENT
Start: 2022-06-20 | End: 2022-06-22 | Stop reason: HOSPADM

## 2022-06-20 RX ORDER — KETOROLAC TROMETHAMINE 30 MG/ML
INJECTION, SOLUTION INTRAMUSCULAR; INTRAVENOUS PRN
Status: DISCONTINUED | OUTPATIENT
Start: 2022-06-20 | End: 2022-06-20

## 2022-06-20 RX ORDER — TRANEXAMIC ACID 10 MG/ML
1 INJECTION, SOLUTION INTRAVENOUS EVERY 30 MIN PRN
Status: DISCONTINUED | OUTPATIENT
Start: 2022-06-20 | End: 2022-06-20 | Stop reason: HOSPADM

## 2022-06-20 RX ORDER — MORPHINE SULFATE 1 MG/ML
INJECTION, SOLUTION EPIDURAL; INTRATHECAL; INTRAVENOUS PRN
Status: DISCONTINUED | OUTPATIENT
Start: 2022-06-20 | End: 2022-06-20

## 2022-06-20 RX ORDER — ONDANSETRON 4 MG/1
4 TABLET, ORALLY DISINTEGRATING ORAL EVERY 6 HOURS PRN
Status: DISCONTINUED | OUTPATIENT
Start: 2022-06-20 | End: 2022-06-20 | Stop reason: HOSPADM

## 2022-06-20 RX ORDER — AMOXICILLIN 250 MG
1 CAPSULE ORAL 2 TIMES DAILY
Status: DISCONTINUED | OUTPATIENT
Start: 2022-06-20 | End: 2022-06-22 | Stop reason: HOSPADM

## 2022-06-20 RX ORDER — AMOXICILLIN 250 MG
2 CAPSULE ORAL 2 TIMES DAILY
Status: DISCONTINUED | OUTPATIENT
Start: 2022-06-20 | End: 2022-06-22 | Stop reason: HOSPADM

## 2022-06-20 RX ORDER — LIDOCAINE 40 MG/G
CREAM TOPICAL
Status: DISCONTINUED | OUTPATIENT
Start: 2022-06-20 | End: 2022-06-20 | Stop reason: HOSPADM

## 2022-06-20 RX ORDER — MISOPROSTOL 200 UG/1
400 TABLET ORAL
Status: DISCONTINUED | OUTPATIENT
Start: 2022-06-20 | End: 2022-06-20 | Stop reason: HOSPADM

## 2022-06-20 RX ORDER — MISOPROSTOL 200 UG/1
800 TABLET ORAL
Status: DISCONTINUED | OUTPATIENT
Start: 2022-06-20 | End: 2022-06-20 | Stop reason: HOSPADM

## 2022-06-20 RX ORDER — SODIUM CHLORIDE, SODIUM LACTATE, POTASSIUM CHLORIDE, CALCIUM CHLORIDE 600; 310; 30; 20 MG/100ML; MG/100ML; MG/100ML; MG/100ML
INJECTION, SOLUTION INTRAVENOUS CONTINUOUS
Status: DISCONTINUED | OUTPATIENT
Start: 2022-06-20 | End: 2022-06-20 | Stop reason: HOSPADM

## 2022-06-20 RX ORDER — NALOXONE HYDROCHLORIDE 0.4 MG/ML
0.4 INJECTION, SOLUTION INTRAMUSCULAR; INTRAVENOUS; SUBCUTANEOUS
Status: DISCONTINUED | OUTPATIENT
Start: 2022-06-20 | End: 2022-06-22 | Stop reason: HOSPADM

## 2022-06-20 RX ORDER — METOCLOPRAMIDE 10 MG/1
10 TABLET ORAL EVERY 6 HOURS PRN
Status: DISCONTINUED | OUTPATIENT
Start: 2022-06-20 | End: 2022-06-22 | Stop reason: HOSPADM

## 2022-06-20 RX ORDER — CITRIC ACID/SODIUM CITRATE 334-500MG
SOLUTION, ORAL ORAL
Status: COMPLETED
Start: 2022-06-20 | End: 2022-06-20

## 2022-06-20 RX ORDER — CEFAZOLIN SODIUM/WATER 2 G/20 ML
SYRINGE (ML) INTRAVENOUS
Status: DISCONTINUED
Start: 2022-06-20 | End: 2022-06-20 | Stop reason: HOSPADM

## 2022-06-20 RX ORDER — ONDANSETRON 4 MG/1
4 TABLET, ORALLY DISINTEGRATING ORAL EVERY 6 HOURS PRN
Status: DISCONTINUED | OUTPATIENT
Start: 2022-06-20 | End: 2022-06-20

## 2022-06-20 RX ORDER — AZITHROMYCIN 500 MG/1
INJECTION, POWDER, LYOPHILIZED, FOR SOLUTION INTRAVENOUS
Status: DISCONTINUED
Start: 2022-06-20 | End: 2022-06-20 | Stop reason: HOSPADM

## 2022-06-20 RX ORDER — PROCHLORPERAZINE MALEATE 10 MG
10 TABLET ORAL EVERY 6 HOURS PRN
Status: DISCONTINUED | OUTPATIENT
Start: 2022-06-20 | End: 2022-06-22 | Stop reason: HOSPADM

## 2022-06-20 RX ORDER — METHYLERGONOVINE MALEATE 0.2 MG/ML
200 INJECTION INTRAVENOUS
Status: DISCONTINUED | OUTPATIENT
Start: 2022-06-20 | End: 2022-06-20 | Stop reason: HOSPADM

## 2022-06-20 RX ORDER — OXYTOCIN/0.9 % SODIUM CHLORIDE 30/500 ML
340 PLASTIC BAG, INJECTION (ML) INTRAVENOUS CONTINUOUS PRN
Status: DISCONTINUED | OUTPATIENT
Start: 2022-06-20 | End: 2022-06-20 | Stop reason: HOSPADM

## 2022-06-20 RX ORDER — ACETAMINOPHEN 325 MG/1
TABLET ORAL
Status: COMPLETED
Start: 2022-06-20 | End: 2022-06-20

## 2022-06-20 RX ORDER — CEFAZOLIN SODIUM/WATER 2 G/20 ML
2 SYRINGE (ML) INTRAVENOUS SEE ADMIN INSTRUCTIONS
Status: DISCONTINUED | OUTPATIENT
Start: 2022-06-20 | End: 2022-06-20 | Stop reason: HOSPADM

## 2022-06-20 RX ORDER — NALOXONE HYDROCHLORIDE 0.4 MG/ML
0.2 INJECTION, SOLUTION INTRAMUSCULAR; INTRAVENOUS; SUBCUTANEOUS
Status: DISCONTINUED | OUTPATIENT
Start: 2022-06-20 | End: 2022-06-22 | Stop reason: HOSPADM

## 2022-06-20 RX ORDER — OXYTOCIN/0.9 % SODIUM CHLORIDE 30/500 ML
PLASTIC BAG, INJECTION (ML) INTRAVENOUS CONTINUOUS PRN
Status: DISCONTINUED | OUTPATIENT
Start: 2022-06-20 | End: 2022-06-20

## 2022-06-20 RX ORDER — CITRIC ACID/SODIUM CITRATE 334-500MG
30 SOLUTION, ORAL ORAL
Status: COMPLETED | OUTPATIENT
Start: 2022-06-20 | End: 2022-06-20

## 2022-06-20 RX ORDER — OXYTOCIN 10 [USP'U]/ML
10 INJECTION, SOLUTION INTRAMUSCULAR; INTRAVENOUS
Status: DISCONTINUED | OUTPATIENT
Start: 2022-06-20 | End: 2022-06-22 | Stop reason: HOSPADM

## 2022-06-20 RX ORDER — PROCHLORPERAZINE 25 MG
25 SUPPOSITORY, RECTAL RECTAL EVERY 12 HOURS PRN
Status: DISCONTINUED | OUTPATIENT
Start: 2022-06-20 | End: 2022-06-20 | Stop reason: HOSPADM

## 2022-06-20 RX ORDER — SIMETHICONE 80 MG
80 TABLET,CHEWABLE ORAL 4 TIMES DAILY PRN
Status: DISCONTINUED | OUTPATIENT
Start: 2022-06-20 | End: 2022-06-22 | Stop reason: HOSPADM

## 2022-06-20 RX ORDER — ONDANSETRON 2 MG/ML
4 INJECTION INTRAMUSCULAR; INTRAVENOUS EVERY 6 HOURS PRN
Status: DISCONTINUED | OUTPATIENT
Start: 2022-06-20 | End: 2022-06-22 | Stop reason: HOSPADM

## 2022-06-20 RX ORDER — NALBUPHINE HYDROCHLORIDE 10 MG/ML
2.5-5 INJECTION, SOLUTION INTRAMUSCULAR; INTRAVENOUS; SUBCUTANEOUS EVERY 6 HOURS PRN
Status: DISCONTINUED | OUTPATIENT
Start: 2022-06-20 | End: 2022-06-22 | Stop reason: HOSPADM

## 2022-06-20 RX ORDER — METOCLOPRAMIDE HYDROCHLORIDE 5 MG/ML
10 INJECTION INTRAMUSCULAR; INTRAVENOUS EVERY 6 HOURS PRN
Status: DISCONTINUED | OUTPATIENT
Start: 2022-06-20 | End: 2022-06-22 | Stop reason: HOSPADM

## 2022-06-20 RX ORDER — MISOPROSTOL 200 UG/1
400 TABLET ORAL
Status: DISCONTINUED | OUTPATIENT
Start: 2022-06-20 | End: 2022-06-22 | Stop reason: HOSPADM

## 2022-06-20 RX ORDER — BUPIVACAINE HYDROCHLORIDE 7.5 MG/ML
INJECTION, SOLUTION INTRASPINAL PRN
Status: DISCONTINUED | OUTPATIENT
Start: 2022-06-20 | End: 2022-06-20

## 2022-06-20 RX ORDER — MISOPROSTOL 200 UG/1
800 TABLET ORAL
Status: DISCONTINUED | OUTPATIENT
Start: 2022-06-20 | End: 2022-06-22 | Stop reason: HOSPADM

## 2022-06-20 RX ORDER — AZITHROMYCIN 500 MG/1
500 INJECTION, POWDER, LYOPHILIZED, FOR SOLUTION INTRAVENOUS
Status: COMPLETED | OUTPATIENT
Start: 2022-06-20 | End: 2022-06-20

## 2022-06-20 RX ORDER — CARBOPROST TROMETHAMINE 250 UG/ML
250 INJECTION, SOLUTION INTRAMUSCULAR
Status: DISCONTINUED | OUTPATIENT
Start: 2022-06-20 | End: 2022-06-20 | Stop reason: HOSPADM

## 2022-06-20 RX ORDER — KETOROLAC TROMETHAMINE 30 MG/ML
30 INJECTION, SOLUTION INTRAMUSCULAR; INTRAVENOUS EVERY 6 HOURS
Status: COMPLETED | OUTPATIENT
Start: 2022-06-20 | End: 2022-06-21

## 2022-06-20 RX ORDER — DIPHENHYDRAMINE HYDROCHLORIDE 50 MG/ML
25 INJECTION INTRAMUSCULAR; INTRAVENOUS EVERY 6 HOURS PRN
Status: DISCONTINUED | OUTPATIENT
Start: 2022-06-20 | End: 2022-06-22 | Stop reason: HOSPADM

## 2022-06-20 RX ORDER — OXYTOCIN 10 [USP'U]/ML
10 INJECTION, SOLUTION INTRAMUSCULAR; INTRAVENOUS
Status: DISCONTINUED | OUTPATIENT
Start: 2022-06-20 | End: 2022-06-20 | Stop reason: HOSPADM

## 2022-06-20 RX ORDER — ACETAMINOPHEN 325 MG/1
650 TABLET ORAL EVERY 4 HOURS PRN
Status: DISCONTINUED | OUTPATIENT
Start: 2022-06-23 | End: 2022-06-22 | Stop reason: HOSPADM

## 2022-06-20 RX ORDER — PROCHLORPERAZINE MALEATE 5 MG
10 TABLET ORAL EVERY 6 HOURS PRN
Status: DISCONTINUED | OUTPATIENT
Start: 2022-06-20 | End: 2022-06-20 | Stop reason: HOSPADM

## 2022-06-20 RX ORDER — ACETAMINOPHEN 325 MG/1
975 TABLET ORAL EVERY 6 HOURS
Status: DISCONTINUED | OUTPATIENT
Start: 2022-06-20 | End: 2022-06-22 | Stop reason: HOSPADM

## 2022-06-20 RX ORDER — MODIFIED LANOLIN
OINTMENT (GRAM) TOPICAL
Status: DISCONTINUED | OUTPATIENT
Start: 2022-06-20 | End: 2022-06-22 | Stop reason: HOSPADM

## 2022-06-20 RX ORDER — DEXTROSE, SODIUM CHLORIDE, SODIUM LACTATE, POTASSIUM CHLORIDE, AND CALCIUM CHLORIDE 5; .6; .31; .03; .02 G/100ML; G/100ML; G/100ML; G/100ML; G/100ML
INJECTION, SOLUTION INTRAVENOUS CONTINUOUS
Status: DISCONTINUED | OUTPATIENT
Start: 2022-06-20 | End: 2022-06-22 | Stop reason: HOSPADM

## 2022-06-20 RX ORDER — ACETAMINOPHEN 325 MG/1
975 TABLET ORAL ONCE
Status: COMPLETED | OUTPATIENT
Start: 2022-06-20 | End: 2022-06-20

## 2022-06-20 RX ORDER — METOCLOPRAMIDE 10 MG/1
10 TABLET ORAL EVERY 6 HOURS PRN
Status: DISCONTINUED | OUTPATIENT
Start: 2022-06-20 | End: 2022-06-20 | Stop reason: HOSPADM

## 2022-06-20 RX ORDER — ONDANSETRON 4 MG/1
4 TABLET, ORALLY DISINTEGRATING ORAL EVERY 6 HOURS PRN
Status: DISCONTINUED | OUTPATIENT
Start: 2022-06-20 | End: 2022-06-22 | Stop reason: HOSPADM

## 2022-06-20 RX ORDER — PROCHLORPERAZINE 25 MG
25 SUPPOSITORY, RECTAL RECTAL EVERY 12 HOURS PRN
Status: DISCONTINUED | OUTPATIENT
Start: 2022-06-20 | End: 2022-06-22 | Stop reason: HOSPADM

## 2022-06-20 RX ORDER — CARBOPROST TROMETHAMINE 250 UG/ML
250 INJECTION, SOLUTION INTRAMUSCULAR
Status: DISCONTINUED | OUTPATIENT
Start: 2022-06-20 | End: 2022-06-22 | Stop reason: HOSPADM

## 2022-06-20 RX ORDER — DIPHENHYDRAMINE HCL 25 MG
25 CAPSULE ORAL EVERY 6 HOURS PRN
Status: DISCONTINUED | OUTPATIENT
Start: 2022-06-20 | End: 2022-06-22 | Stop reason: HOSPADM

## 2022-06-20 RX ORDER — BISACODYL 10 MG
10 SUPPOSITORY, RECTAL RECTAL DAILY PRN
Status: DISCONTINUED | OUTPATIENT
Start: 2022-06-22 | End: 2022-06-22 | Stop reason: HOSPADM

## 2022-06-20 RX ORDER — ONDANSETRON 2 MG/ML
4 INJECTION INTRAMUSCULAR; INTRAVENOUS EVERY 6 HOURS PRN
Status: DISCONTINUED | OUTPATIENT
Start: 2022-06-20 | End: 2022-06-20

## 2022-06-20 RX ORDER — OXYTOCIN/0.9 % SODIUM CHLORIDE 30/500 ML
340 PLASTIC BAG, INJECTION (ML) INTRAVENOUS CONTINUOUS PRN
Status: DISCONTINUED | OUTPATIENT
Start: 2022-06-20 | End: 2022-06-22 | Stop reason: HOSPADM

## 2022-06-20 RX ORDER — IBUPROFEN 400 MG/1
800 TABLET, FILM COATED ORAL EVERY 6 HOURS PRN
Status: DISCONTINUED | OUTPATIENT
Start: 2022-06-20 | End: 2022-06-22 | Stop reason: HOSPADM

## 2022-06-20 RX ORDER — TRANEXAMIC ACID 10 MG/ML
1 INJECTION, SOLUTION INTRAVENOUS EVERY 30 MIN PRN
Status: DISCONTINUED | OUTPATIENT
Start: 2022-06-20 | End: 2022-06-22 | Stop reason: HOSPADM

## 2022-06-20 RX ORDER — METHYLERGONOVINE MALEATE 0.2 MG/ML
200 INJECTION INTRAVENOUS
Status: DISCONTINUED | OUTPATIENT
Start: 2022-06-20 | End: 2022-06-22 | Stop reason: HOSPADM

## 2022-06-20 RX ORDER — LIDOCAINE 40 MG/G
CREAM TOPICAL
Status: DISCONTINUED | OUTPATIENT
Start: 2022-06-20 | End: 2022-06-22 | Stop reason: HOSPADM

## 2022-06-20 RX ORDER — HYDROMORPHONE HCL IN WATER/PF 6 MG/30 ML
.3-.5 PATIENT CONTROLLED ANALGESIA SYRINGE INTRAVENOUS EVERY 30 MIN PRN
Status: DISCONTINUED | OUTPATIENT
Start: 2022-06-20 | End: 2022-06-22 | Stop reason: HOSPADM

## 2022-06-20 RX ORDER — ONDANSETRON 2 MG/ML
INJECTION INTRAMUSCULAR; INTRAVENOUS PRN
Status: DISCONTINUED | OUTPATIENT
Start: 2022-06-20 | End: 2022-06-20

## 2022-06-20 RX ORDER — OXYCODONE HYDROCHLORIDE 5 MG/1
5 TABLET ORAL EVERY 4 HOURS PRN
Status: DISCONTINUED | OUTPATIENT
Start: 2022-06-20 | End: 2022-06-22 | Stop reason: HOSPADM

## 2022-06-20 RX ORDER — ONDANSETRON 2 MG/ML
4 INJECTION INTRAMUSCULAR; INTRAVENOUS EVERY 6 HOURS PRN
Status: DISCONTINUED | OUTPATIENT
Start: 2022-06-20 | End: 2022-06-20 | Stop reason: HOSPADM

## 2022-06-20 RX ORDER — OXYTOCIN/0.9 % SODIUM CHLORIDE 30/500 ML
100-340 PLASTIC BAG, INJECTION (ML) INTRAVENOUS CONTINUOUS PRN
Status: DISCONTINUED | OUTPATIENT
Start: 2022-06-20 | End: 2022-06-22 | Stop reason: HOSPADM

## 2022-06-20 RX ORDER — METOCLOPRAMIDE HYDROCHLORIDE 5 MG/ML
10 INJECTION INTRAMUSCULAR; INTRAVENOUS EVERY 6 HOURS PRN
Status: DISCONTINUED | OUTPATIENT
Start: 2022-06-20 | End: 2022-06-20 | Stop reason: HOSPADM

## 2022-06-20 RX ORDER — CEFAZOLIN SODIUM/WATER 2 G/20 ML
2 SYRINGE (ML) INTRAVENOUS
Status: COMPLETED | OUTPATIENT
Start: 2022-06-20 | End: 2022-06-20

## 2022-06-20 RX ADMIN — ACETAMINOPHEN 975 MG: 325 TABLET ORAL at 23:01

## 2022-06-20 RX ADMIN — AZITHROMYCIN MONOHYDRATE 500 MG: 500 INJECTION, POWDER, LYOPHILIZED, FOR SOLUTION INTRAVENOUS at 14:05

## 2022-06-20 RX ADMIN — METOCLOPRAMIDE 10 MG: 5 INJECTION, SOLUTION INTRAMUSCULAR; INTRAVENOUS at 18:24

## 2022-06-20 RX ADMIN — ACETAMINOPHEN 975 MG: 325 TABLET ORAL at 14:03

## 2022-06-20 RX ADMIN — MORPHINE SULFATE 0.2 MG: 1 INJECTION, SOLUTION EPIDURAL; INTRATHECAL; INTRAVENOUS at 14:29

## 2022-06-20 RX ADMIN — ONDANSETRON 4 MG: 2 INJECTION INTRAMUSCULAR; INTRAVENOUS at 14:27

## 2022-06-20 RX ADMIN — SODIUM CHLORIDE, SODIUM LACTATE, POTASSIUM CHLORIDE, CALCIUM CHLORIDE AND DEXTROSE MONOHYDRATE: 5; 600; 310; 30; 20 INJECTION, SOLUTION INTRAVENOUS at 18:37

## 2022-06-20 RX ADMIN — PHENYLEPHRINE HYDROCHLORIDE 0.5 MCG/KG/MIN: 10 INJECTION INTRAVENOUS at 14:27

## 2022-06-20 RX ADMIN — BUPIVACAINE HYDROCHLORIDE IN DEXTROSE 12 MG: 7.5 INJECTION, SOLUTION SUBARACHNOID at 14:29

## 2022-06-20 RX ADMIN — SODIUM CHLORIDE, POTASSIUM CHLORIDE, SODIUM LACTATE AND CALCIUM CHLORIDE: 600; 310; 30; 20 INJECTION, SOLUTION INTRAVENOUS at 13:40

## 2022-06-20 RX ADMIN — SODIUM CITRATE AND CITRIC ACID MONOHYDRATE 30 ML: 500; 334 SOLUTION ORAL at 14:04

## 2022-06-20 RX ADMIN — Medication 30 ML: at 14:04

## 2022-06-20 RX ADMIN — HYDROMORPHONE HYDROCHLORIDE 0.5 MG: 1 INJECTION, SOLUTION INTRAMUSCULAR; INTRAVENOUS; SUBCUTANEOUS at 15:24

## 2022-06-20 RX ADMIN — KETOROLAC TROMETHAMINE 30 MG: 30 INJECTION, SOLUTION INTRAMUSCULAR at 15:19

## 2022-06-20 RX ADMIN — Medication 2 G: at 14:20

## 2022-06-20 RX ADMIN — Medication 340 ML/HR: at 14:45

## 2022-06-20 RX ADMIN — KETOROLAC TROMETHAMINE 30 MG: 30 INJECTION, SOLUTION INTRAMUSCULAR at 21:15

## 2022-06-20 ASSESSMENT — ACTIVITIES OF DAILY LIVING (ADL)
ADLS_ACUITY_SCORE: 35
ADLS_ACUITY_SCORE: 31

## 2022-06-20 ASSESSMENT — ENCOUNTER SYMPTOMS: SEIZURES: 0

## 2022-06-20 ASSESSMENT — LIFESTYLE VARIABLES: TOBACCO_USE: 0

## 2022-06-20 NOTE — PROVIDER NOTIFICATION
06/20/22 1255   Provider Notification   Provider Name/Title Dr. Joel   Method of Notification At Bedside;In Department   Request Evaluate in Person   Dr. Joel evaluating in person per patient and spouse request.

## 2022-06-20 NOTE — TELEPHONE ENCOUNTER
Fabienne every 15 mins-sounds uncomfortable. Bleeding episode from this AM has resolved. No leaking of fluid.   Hx of c/s x 2.    Will go to the [facility] for evaluation  [facility] notified.  Nati Eden RN on 6/20/2022 at 8:57 AM

## 2022-06-20 NOTE — PLAN OF CARE
1345: Assumed care of Pt. Finished prepping Pt for .    1403: Handoff of antibiotics to CRNA.    1408: Dr. Joel at bedside to discuss POC.    1412: Informed Consent obtained.     1416: Pt walked to OR #1.

## 2022-06-20 NOTE — H&P
[facility]    History and Physical  Obstetrics and Gynecology     Date of Admission:  2022    Assessment & Plan   Felipa Bermudez is a 36 year old female who presents for Repeat  section.     ASSESSMENT:   IUP @ 37w3d  Early labor, previous  X 2      PLAN:   Scheduled for  section.     Jodi Joel MD    History of Present Illness   Felipa Bermudez is a 36 year old female  37w3d  Estimated Date of Delivery: 2022 is calculated from Patient's last menstrual period was 10/01/2021. [facility]  [facility].  She had intercourse last night and woke up with severe contractions that were every 15 minutes. She also had vaginal bleeding that has since resolved. On my evaluation in the [facility] she states that although the contractions are not as intense as they were this AM she still has to breathe through them. She last had a muffin at 9 am. Per our prior discussion she no longer desires a bilateral tubal ligation at the time of delivery.     PRENATAL COURSE  Prenatal course was complicated by two previous  sections. Elevated 1 hr GTT with borderline normal 3 hour GTT      Recent Labs   Lab Test 22  1101 21  1533 18  1421   ABO  --   --  A   RH  --   --  Pos   AS Negative   < > Neg    < > = values in this interval not displayed.     Rhogam not indicated   Recent Labs   Lab Test 21  1533 19  1400 18  1615   HEPBANG Nonreactive  --   --    HIAGAB Nonreactive   < >  --    GBS  --   --  Negative   RUQIGG Positive*  --   --     < > = values in this interval not displayed.         Prior to Admission Medications   Prior to Admission Medications   Prescriptions Last Dose Informant Patient Reported? Taking?   Prenatal Vit-Fe Sulfate-FA-DHA (PRENATAL VITAMIN/MIN +DHA) 27-0.8-200 MG CAPS Past Week at Unknown time  Yes Yes   Sig: Take 1 tablet by mouth daily     "  Facility-Administered Medications: None     Allergies   Allergies   Allergen Reactions     No Known Allergies          Immunization History   Immunization History   Administered Date(s) Administered     COVID-19,PF,Pfizer (12+ Yrs) 2021, 2021, 2022     DTaP, Unspecified 10/17/2006     HepB, Unspecified 1997, 06/10/1997, 1997     HepB-Adult 1997, 1997     Influenza (IIV3) PF 10/17/2006     Influenza Vaccine IM > 6 months Valent IIV4 (Alfuria,Fluzone) 2018, 2020, 2021     MMR 1996, 1996, 1996, 1996     OPV, trivalent, live 1996, 1996     Poliovirus, inactivated (IPV) 1996, 1996, 1996, 1996, 1997, 1997     TD (ADULT, 7+) 1996, 1996, 1996, 06/10/1997     TDAP Vaccine (Adacel) 2018     Td (Adult), Adsorbed 1996, 1996, 1996, 06/10/1997     Td,adult,historic,unspecified 1996     Tdap (Adacel,Boostrix) 10/17/2006, 10/17/2006, 2022     Varicella 1997, 1997, 2019       Past Medical History:   Diagnosis Date     Herpes      LGSIL on Pap smear of cervix 2004 NIL 04 LSIL 04 Colpo bx MANUEL I, ECC acellular 05 LSIL 05 Colpo ECC neg 14 NIL (Care Everywhere) 18 NIL pap, neg HPV 21 NIL pap, neg HPV. Plan: await provider       Past Surgical History:   Procedure Laterality Date      SECTION N/A 2018    Procedure:  SECTION;  Surgeon: Jodi Joel MD;  Location: [facility]     ZZC  DELIVERY ONLY  03    , Low Cervical       Vitals:    22 1000 22 1004   BP: 124/57    BP Location: Left arm    Pulse: 80    Resp: 16    TempSrc: Temporal    Weight:  86.2 kg (190 lb)   Height:  1.651 m (5' 5\")       Abdomen: gravid, single vertex fetus, non-tender, EFW 7 lbs 3oz  SVE: 1.5/70/-2  FHT: 130bpm/+ accels/ no decels  [place]: unable to " trace. Patient reporting every 15 minutes and breathing through them.  Constitutional: healthy, alert, active and no distress   Extremities: NT, no edema  Neurologic: Awake, alert, oriented x3  Neuropsychiatric: General: normal, calm and normal eye contact  Heart: Regular rate and rhythm  Lungs: clear to ausculation bilaterally    Jodi Joel MD

## 2022-06-20 NOTE — TELEPHONE ENCOUNTER
Patient is 37 weeks pregnant and asked if she could get a call from a nurse regarding vaginal bleeding, mucus, and contractions. Please call back.

## 2022-06-20 NOTE — ANESTHESIA CARE TRANSFER NOTE
Patient: Felipa Bermudez    Procedure: Procedure(s):  REPEAT  SECTION       Diagnosis: Supervision of high risk elderly multigravida in third trimester [O09.523]  Previous  delivery, antepartum [O34.219]  Diagnosis Additional Information: No value filed.    Anesthesia Type:   Spinal     Note:    Oropharynx: oropharynx clear of all foreign objects and spontaneously breathing  Level of Consciousness: drowsy and awake  Oxygen Supplementation: room air    Independent Airway: airway patency satisfactory and stable  Dentition: dentition unchanged  Vital Signs Stable: post-procedure vital signs reviewed and stable  Report to RN Given: handoff report given  Patient transferred to: Labor and Delivery    Handoff Report: Identifed the Patient, Identified the Reponsible Provider, Reviewed the pertinent medical history, Discussed the surgical course, Reviewed Intra-OP anesthesia mangement and issues during anesthesia, Set expectations for post-procedure period and Allowed opportunity for questions and acknowledgement of understanding      Vitals:  Vitals Value Taken Time   BP 95/58 22 1531   Temp     Pulse 58 22 1533   Resp 16 22 1533   SpO2 97 % 22 1533   Vitals shown include unvalidated device data.    Electronically Signed By: OSWALDO Casas CRNA  2022  3:35 PM

## 2022-06-20 NOTE — TELEPHONE ENCOUNTER
RN Triage:    Follow up call.   Patient states she spoke with a nurse from the clinic and was instructed to go into the hospital.  Pt is waiting for her  presently.  Pt states she has not had further bleeding but contractions are lasting longer.    Pt intends to go to labor and delivery as instructed.    Amelie Brice RN  [place] [facility]

## 2022-06-20 NOTE — PLAN OF CARE
Pt transferred to [facility] on cart with baby in her arms.    ID Bands checked with [facility] nurse.    Report given to Patria FARRELL RN, who will assume cares.

## 2022-06-20 NOTE — PLAN OF CARE
" presents at 37 3/7 weeks gestation c/o cramping and pressure since 0630 this morning. Patient feeling \"pressure every 15 minutes with cramping in between.\" External monitors applied after verbal consent by patient. Vital signs obtained and WDL. Admission database obtained, prenatal records reviewed. Patient oriented to room, call light and plan of care.     SVE 1.5/70/-2, posterior and soft with small amount of bloody show. Patient last had intercourse \"yesterday.\" Patient last had a muffin at 0900.  "

## 2022-06-20 NOTE — PROVIDER NOTIFICATION
06/20/22 1255   Provider Notification   Provider Name/Title Dr. Joel   Method of Notification At Bedside;In Department   Request Evaluate in Person   Will plan to proceed with repeat C/Section

## 2022-06-20 NOTE — PROVIDER NOTIFICATION
06/20/22 1045   Provider Notification   Provider Name/Title Dr. Joel   Method of Notification Phone   Request Evaluate - Remote   Notification Reason Patient Arrived;Labor Status;Uterine Activity;Pain;Bleeding;SVE;Status Update   Dr. Joel will placed orders from clinic. Will continue to watch/evaluate/r/o labor.

## 2022-06-20 NOTE — BRIEF OP NOTE
[facility]    Brief Operative Note    Pre-operative diagnosis: Supervision of high risk elderly multigravida in third trimester [O09.523]  Previous  delivery, antepartum [O34.219]. Labor  Post-operative diagnosis Same as pre-operative diagnosis    Procedure: Procedure(s):  REPEAT  SECTION  Surgeon: Surgeon(s) and Role:     * Jodi Joel MD - Primary  Anesthesia: Spinal   Estimated Blood Loss: 500 ml    Drains: None  Specimens:   ID Type Source Tests Collected by Time Destination   A :  Tissue Umbilical Cord OR DOCUMENTATION ONLY Jodi Joel MD 2022  2:44 PM    B :  Cord blood Umbilical Cord OR DOCUMENTATION ONLY Jodi Joel MD 2022  2:44 PM    C :  Placenta Placenta SPECIMEN DISCARDED, OR DOCUMENTATION ONLY Jodi Joel MD 2022  2:46 PM      Findings:   Female infant in left occiput transverse presentation. Apgars 9,9.  Weight 6 lbs 9 ounces. Normal uterus. Normal bilateral fallopian tubes. Normal right ovary. Left ovary adhered to the posterior uterus..  Complications: None.  Implants: * No implants in log *    [id number]

## 2022-06-20 NOTE — INTERVAL H&P NOTE
"I have reviewed the surgical (or preoperative) H&P that is linked to this encounter, and examined the patient. There are no significant changes    Clinical Conditions Present on Arrival:  Clinically Significant Risk Factors Present on Admission                   # Obesity: Estimated body mass index is 31.62 kg/m  as calculated from the following:    Height as of this encounter: 1.651 m (5' 5\").    Weight as of this encounter: 86.2 kg (190 lb).       "

## 2022-06-20 NOTE — ANESTHESIA PREPROCEDURE EVALUATION
Anesthesia Pre-Procedure Evaluation    Patient: Felipa Bermudez   MRN: [id number] : 1985        Procedure : Procedure(s):  REPEAT  SECTION          Past Medical History:   Diagnosis Date     Herpes      LGSIL on Pap smear of cervix 2004 NIL 04 LSIL 04 Colpo bx MANUEL I, ECC acellular 05 LSIL 05 Colpo ECC neg 14 NIL (Care Everywhere) 18 NIL pap, neg HPV 21 NIL pap, neg HPV. Plan: await provider      Past Surgical History:   Procedure Laterality Date      SECTION N/A 2018    Procedure:  SECTION;  Surgeon: Jodi Joel MD;  Location: [facility]  DELIVERY ONLY  03    , Low Cervical      Allergies   Allergen Reactions     No Known Allergies       Social History     Tobacco Use     Smoking status: Never Smoker     Smokeless tobacco: Never Used   Substance Use Topics     Alcohol use: No      Wt Readings from Last 1 Encounters:   22 86.2 kg (190 lb)        Anesthesia Evaluation            ROS/MED HX  ENT/Pulmonary:    (-) tobacco use and sleep apnea   Neurologic:    (-) no seizures and no CVA   Cardiovascular:    (-) hypertension   METS/Exercise Tolerance:     Hematologic:       Musculoskeletal:       GI/Hepatic:    (-) GERD and liver disease   Renal/Genitourinary:    (-) renal disease   Endo:    (-) Type II DM and thyroid disease   Psychiatric/Substance Use:       Infectious Disease:       Malignancy:       Other:            Physical Exam    Airway        Mallampati: II   TM distance: > 3 FB   Neck ROM: full   Mouth opening: > 3 cm    Respiratory Devices and Support         Dental  no notable dental history         Cardiovascular   cardiovascular exam normal          Pulmonary   pulmonary exam normal                OUTSIDE LABS:  CBC:   Lab Results   Component Value Date    WBC 9.2 2022    WBC 9.7 2022    HGB 11.3 (L) 2022    HGB 11.7 2022    HCT 34.8 (L) 2022     HCT 35.8 04/18/2022     06/20/2022     04/18/2022     BMP:   Lab Results   Component Value Date     11/19/2021    POTASSIUM 3.4 11/19/2021    CHLORIDE 105 11/19/2021    CO2 23 11/19/2021    BUN 14 11/19/2021    CR 0.57 11/19/2021     (H) 11/19/2021    GLC 84 05/19/2004     COAGS: No results found for: PTT, INR, FIBR  POC: No results found for: BGM, HCG, HCGS  HEPATIC: No results found for: ALBUMIN, PROTTOTAL, ALT, AST, GGT, ALKPHOS, BILITOTAL, BILIDIRECT, QASIM  OTHER:   Lab Results   Component Value Date    ANEUDY 9.0 11/19/2021    TSH 0.65 05/19/2004       Anesthesia Plan    ASA Status:  2, emergent       Anesthesia Type: Spinal.              Consents    Anesthesia Plan(s) and associated risks, benefits, and realistic alternatives discussed. Questions answered and patient/representative(s) expressed understanding.    - Discussed:     - Discussed with:  Patient         Postoperative Care    Pain management: Multi-modal analgesia.   PONV prophylaxis: Ondansetron (or other 5HT-3)     Comments:                Matt Laguerre MD

## 2022-06-20 NOTE — ANESTHESIA POSTPROCEDURE EVALUATION
Patient: Felipa Bermudez    Procedure: Procedure(s):  REPEAT  SECTION       Anesthesia Type:  Spinal    Note:  Disposition: Inpatient   Postop Pain Control: Uneventful            Sign Out: Well controlled pain   PONV: No   Neuro/Psych: Uneventful            Sign Out: Acceptable/Baseline neuro status   Airway/Respiratory: Uneventful            Sign Out: Acceptable/Baseline resp. status   CV/Hemodynamics: Uneventful            Sign Out: Acceptable CV status; No obvious hypovolemia; No obvious fluid overload   Other NRE: NONE   DID A NON-ROUTINE EVENT OCCUR?            Last vitals:  Vitals Value Taken Time   /53 22 1546   Temp     Pulse 59 22 1555   Resp 12 22 1555   SpO2 96 % 22 1555   Vitals shown include unvalidated device data.    Electronically Signed By: Yudy Cm  2022  3:57 PM

## 2022-06-20 NOTE — ANESTHESIA PROCEDURE NOTES
Intrathecal Procedure Note    Pre-Procedure   Staff -        Anesthesiologist:  Matt Laguerre MD       Performed By: anesthesiologist       Location: OR       Pre-Anesthestic Checklist: patient identified, IV checked, site marked, risks and benefits discussed, informed consent, monitors and equipment checked, pre-op evaluation, at physician/surgeon's request and post-op pain management  Timeout:       Correct Patient: Yes        Correct Procedure: Yes        Correct Site: Yes        Correct Position: Yes   Procedure Documentation  Procedure: intrathecal       Patient Position: sitting       Patient Prep/Sterile Barriers: sterile gloves, mask, patient draped       Skin prep: Betadine       Insertion Site: L3-4. (midline approach).       Needle Gauge: 24.        Needle Length (Inches): 4        Spinal Needle Type: Pencan       Introducer used       # of attempts: 1 and  # of redirects:     Assessment/Narrative         Paresthesias: No.       CSF fluid: clear.     Comments:  No complications

## 2022-06-20 NOTE — TELEPHONE ENCOUNTER
RN Triage:    Spoke with 36 yr old Felipa who c/o:    OB Triage Call    Is patient's OB/Midwife with the [facility]? [facility]- Proceed with triage     Reason for call:     37 weeks today. 3rd pregnancy.    Using the bathroom this morning and has a few small drops of vaginal bleeding.  Mucus with small red blood clots.  Sitting on the toilet and a long thin bright red blood clot came out.  Contractions this morning started at 6:30 a.m.   Every 15 minutes and last for about 45 seconds at a time  More pressure.  Denies pregnancy.  Yesterday dizzy.  Paged OB on call at 8:09 am.  Incorrect OB MD returned page.  Will try again.    Unable to find who is on call for this group this morning.  Spoke with supervisor and will contact clinic directly.    Spoke with Jennyfer in scheduling who will send high priority message to triage nurses at clinic.      Assessment:   Unable to find OB on call in [facility].  Will send high priority message to clinic.    Plan:   Protocol advises labor and delivery.  Will send high priority message to the clinic.    Patient plans to deliver at [facility]    Patient's primary OB Provider is Dr Joel    Per protocol recommendations Consult needed for [facility] MD or Midwife.  Patient's primary OB is [facility].  On-Call provider [facility] paged at 8:09 am. Call returned at 8:11 am and advised on triage assessment.  Provider recommendations: Incorrect paging group.  High priority message to clinic as unable to locate [facility]    Is patient's delivering hospital on divert? No      37w3d    Estimated Date of Delivery: 2022        OB History    Para Term  AB Living   3 2 2 0 0 2   SAB IAB Ectopic Multiple Live Births   0 0 0 0 2      # Outcome Date GA Lbr [place]/2nd Weight Sex Delivery Anes PTL Lv   3 Current            2 Term 18 38w2d  3.05 kg (6 lb 11.6 oz) M CS-LTranv EPI  CHERY      Complications: Other Excessive Bleeding,  Cephalopelvic Disproportion      Name: Roderick      Apgar1: 8     1 Term 11/07/03 40w0d  3.26 kg (7 lb 3 oz) F CS-LTranv   CHERY      Birth Comments: epidural      Name: Lissett       Lab Results   Component Value Date    GBS Negative 11/06/2018          Amelie Brice RN 06/20/22 7:46 AM  [facility] Nurse Advisor    Reason for Disposition    Abdominal pain or having contractions    Additional Information    Negative: Passed out (i.e., fainted, collapsed and was not responding)    Negative: Shock suspected (e.g., cold/pale/clammy skin, too weak to stand, low BP, rapid pulse)    Negative: Difficult to awaken or acting confused (e.g., disoriented, slurred speech)    Negative: SEVERE vaginal bleeding (e.g., continuous red blood from vagina, or large blood clots)    Negative: SEVERE constant abdominal pain (e.g., excruciating) and present > 1 hour    Negative: Sounds like a life-threatening emergency to the triager    Negative: Vaginal bleeding and pregnant < 20 weeks    Negative: MILD-MODERATE vaginal bleeding (e.g., small to medium clots; like mild menstrual period) (Exception: Single episode after sexual intercourse or pelvic exam)    Protocols used: PREGNANCY - VAGINAL BLEEDING GREATER THAN 20 WEEKS EGA-A-[place]

## 2022-06-20 NOTE — PROVIDER NOTIFICATION
MDA pager paged. Dr. Dwyer called back. Recent vitals read back to MDA. Pt not feeling any dizziness, so MD stated not to treat with any medications at this time.

## 2022-06-21 LAB — HGB BLD-MCNC: 9.2 G/DL (ref 11.7–15.7)

## 2022-06-21 PROCEDURE — 250N000011 HC RX IP 250 OP 636: Performed by: OBSTETRICS & GYNECOLOGY

## 2022-06-21 PROCEDURE — 258N000003 HC RX IP 258 OP 636: Performed by: OBSTETRICS & GYNECOLOGY

## 2022-06-21 PROCEDURE — 85018 HEMOGLOBIN: CPT | Performed by: OBSTETRICS & GYNECOLOGY

## 2022-06-21 PROCEDURE — 120N000012 HC R&B POSTPARTUM

## 2022-06-21 PROCEDURE — 36415 COLL VENOUS BLD VENIPUNCTURE: CPT | Performed by: OBSTETRICS & GYNECOLOGY

## 2022-06-21 PROCEDURE — 250N000013 HC RX MED GY IP 250 OP 250 PS 637: Performed by: OBSTETRICS & GYNECOLOGY

## 2022-06-21 RX ADMIN — KETOROLAC TROMETHAMINE 30 MG: 30 INJECTION, SOLUTION INTRAMUSCULAR at 15:18

## 2022-06-21 RX ADMIN — KETOROLAC TROMETHAMINE 30 MG: 30 INJECTION, SOLUTION INTRAMUSCULAR at 10:12

## 2022-06-21 RX ADMIN — SENNOSIDES AND DOCUSATE SODIUM 1 TABLET: 50; 8.6 TABLET ORAL at 08:43

## 2022-06-21 RX ADMIN — ACETAMINOPHEN 975 MG: 325 TABLET ORAL at 12:01

## 2022-06-21 RX ADMIN — SODIUM CHLORIDE, POTASSIUM CHLORIDE, SODIUM LACTATE AND CALCIUM CHLORIDE 1000 ML: 600; 310; 30; 20 INJECTION, SOLUTION INTRAVENOUS at 13:27

## 2022-06-21 RX ADMIN — SENNOSIDES AND DOCUSATE SODIUM 1 TABLET: 50; 8.6 TABLET ORAL at 21:08

## 2022-06-21 RX ADMIN — ACETAMINOPHEN 975 MG: 325 TABLET ORAL at 05:19

## 2022-06-21 RX ADMIN — ACETAMINOPHEN 975 MG: 325 TABLET ORAL at 18:09

## 2022-06-21 RX ADMIN — KETOROLAC TROMETHAMINE 30 MG: 30 INJECTION, SOLUTION INTRAMUSCULAR at 03:00

## 2022-06-21 RX ADMIN — IBUPROFEN 800 MG: 400 TABLET, FILM COATED ORAL at 21:08

## 2022-06-21 ASSESSMENT — ACTIVITIES OF DAILY LIVING (ADL)
ADLS_ACUITY_SCORE: 35

## 2022-06-21 NOTE — PROGRESS NOTES
[facility] Obstetrics Post-Op / Progress Note         Assessment and Plan:    Assessment:   Post-operative day #1  Low transverse repeat  section  L&D complications: None      Doing well.  Pain well-controlled.  Inadequate urine output      Plan:   Keep gonzalez in place until urine output is adequate  Follow up AM hemoglobin  Continue Post op cares.  Will give IV fluid bolus as urine appears concentrated at the bedside           Interval History:   Doing better this AM. Breastfeeding Tameka. No other concerns. Emesis last night with light headedness          Significant Problems:    None          Review of Systems:    The Review of Systems is negative other than noted in the HPI          Medications:     Current Facility-Administered Medications   Medication     [START ON 2022] acetaminophen (TYLENOL) tablet 650 mg     acetaminophen (TYLENOL) tablet 975 mg     [START ON 2022] bisacodyl (DULCOLAX) Suppository 10 mg     carboprost (HEMABATE) injection 250 mcg     dextrose 5% in lactated ringers infusion     diphenhydrAMINE (BENADRYL) capsule 25 mg    Or     diphenhydrAMINE (BENADRYL) injection 25 mg     hydrocortisone (Perianal) (ANUSOL-HC) 2.5 % cream     HYDROmorphone (DILAUDID) injection 0.3-0.5 mg     ibuprofen (ADVIL/MOTRIN) tablet 800 mg     ketorolac (TORADOL) injection 30 mg     lanolin cream     lidocaine (LMX4) cream     lidocaine 1 % 0.1-1 mL     magnesium hydroxide (MILK OF MAGNESIA) suspension 30 mL     methylergonovine (METHERGINE) injection 200 mcg     metoclopramide (REGLAN) injection 10 mg    Or     metoclopramide (REGLAN) tablet 10 mg     misoprostol (CYTOTEC) tablet 400 mcg    Or     misoprostol (CYTOTEC) tablet 800 mcg     nalbuphine (NUBAIN) injection 2.5-5 mg     naloxone (NARCAN) injection 0.2 mg    Or     naloxone (NARCAN) injection 0.4 mg    Or     naloxone (NARCAN) injection 0.2 mg    Or     naloxone (NARCAN) injection 0.4 mg     No MMR Needed -  Assessment:  Patient does not need MMR vaccine     No Tdap Needed - Assessment: Patient does not need Tdap vaccine     ondansetron (ZOFRAN ODT) ODT tab 4 mg    Or     ondansetron (ZOFRAN) injection 4 mg     oxyCODONE (ROXICODONE) tablet 5 mg     oxytocin (PITOCIN) 30 units in 500 mL 0.9% NaCl infusion     oxytocin (PITOCIN) 30 units in 500 mL 0.9% NaCl infusion     oxytocin (PITOCIN) injection 10 Units     oxytocin (PITOCIN) injection 10 Units     prochlorperazine (COMPAZINE) injection 10 mg    Or     prochlorperazine (COMPAZINE) tablet 10 mg    Or     prochlorperazine (COMPAZINE) suppository 25 mg     senna-docusate (SENOKOT-S/PERICOLACE) 8.6-50 MG per tablet 1 tablet    Or     senna-docusate (SENOKOT-S/PERICOLACE) 8.6-50 MG per tablet 2 tablet     simethicone (MYLICON) chewable tablet 80 mg     sodium chloride (PF) 0.9% PF flush 3 mL     sodium chloride (PF) 0.9% PF flush 3 mL     [START ON 6/22/2022] sodium phosphate (FLEET ENEMA) 1 enema     tranexamic acid 1 g in 100 mL NS IV bag (premix)             Physical Exam:     Patient Vitals for the past 24 hrs:   BP Temp Temp src Pulse Resp SpO2 Height Weight   06/21/22 0355 111/64 98.2  F (36.8  C) Oral 70 16 98 % -- --   06/21/22 0000 115/70 97.7  F (36.5  C) Oral 62 16 97 % -- --   06/20/22 2301 113/66 97.2  F (36.2  C) Oral 63 16 97 % -- --   06/20/22 2200 113/67 97.7  F (36.5  C) Oral 59 16 97 % -- --   06/20/22 2100 118/76 97.3  F (36.3  C) Axillary 55 16 98 % -- --   06/20/22 2000 110/65 97.4  F (36.3  C) Oral 54 16 99 % -- --   06/20/22 1900 118/65 -- -- -- 16 98 % -- --   06/20/22 1830 116/67 98  F (36.7  C) Oral -- 16 98 % -- --   06/20/22 1752 112/64 98  F (36.7  C) Oral -- 16 98 % -- --   06/20/22 1715 99/58 -- -- 53 16 100 % -- --   06/20/22 1705 102/40 -- -- 56 16 96 % -- --   06/20/22 1700 (!) 88/59 -- -- 55 16 96 % -- --   06/20/22 1655 (!) 89/55 -- -- 54 18 96 % -- --   06/20/22 1652 93/40 -- -- 58 14 -- -- --   06/20/22 1650 (!) 86/51 -- -- 59 14 97 % -- --  "  06/20/22 1649 (!) 86/53 -- -- 67 16 -- -- --   06/20/22 1647 (!) 88/46 -- -- 54 16 -- -- --   06/20/22 1645 (!) 89/51 -- -- 55 15 96 % -- --   06/20/22 1630 97/77 -- -- 55 18 94 % -- --   06/20/22 1615 95/45 -- -- 57 18 96 % -- --   06/20/22 1600 97/53 -- -- 55 14 95 % -- --   06/20/22 1545 100/53 97.7  F (36.5  C) Oral 56 18 97 % -- --   06/20/22 1530 95/58 -- -- 58 16 96 % -- --   06/20/22 1529 97/54 -- -- 56 18 97 % -- --   06/20/22 1004 -- -- -- -- -- -- 1.651 m (5' 5\") 86.2 kg (190 lb)   06/20/22 1000 124/57 -- Temporal 80 16 -- -- --     GEN: NAD  GI: soft and non-distended  Ext: non-tender          Data:     Hemoglobin   Date Value Ref Range Status   06/20/2022 11.3 (L) 11.7 - 15.7 g/dL Final   04/18/2022 11.7 11.7 - 15.7 g/dL Final   11/19/2021 13.8 11.7 - 15.7 g/dL Final   11/21/2018 9.9 (L) 11.7 - 15.7 g/dL Final   11/20/2018 10.3 (L) 11.7 - 15.7 g/dL Final   11/19/2018 12.2 11.7 - 15.7 g/dL Final   09/11/2018 11.6 (L) 11.7 - 15.7 g/dL Final   04/23/2018 13.3 11.7 - 15.7 g/dL Final     -    Jodi Joel MD       "

## 2022-06-21 NOTE — PLAN OF CARE
Vital signs stable. Fundus firm midline at umbilicus with scant flow.  dressing  removed and steri strips in place.  Josue  discontinued and patient  is able to void.  Lung sounds clear and equal. Ibuprofen and tylenol for pain management.  Free of dizziness. Bolus of 1000 cc LR for low BP and dizziness given this am.Working on breastfeeding every 2-3 hours. Encouraged to call with questions/concerns. Will continue to monitor.

## 2022-06-21 NOTE — PLAN OF CARE
Vital signs stable. Fundus firm midline at umbilicus with scant flow. Incision dressing intact. Josue patent. Lung sounds clear and equal. Using Toradol and tylenol for pain management. Stood up at bedside c/o  dizziness. Working on breastfeeding every 2-3 hours. Encouraged to call with questions/concerns. Will continue to monitor.

## 2022-06-21 NOTE — LACTATION NOTE
Routine Lactation visit with Felipa, significant other TC & baby girl Tameka. At time of visit, Felipa was feeding baby girl in cradle hold at right breast, lips flanged widely, nutritive suck pattern observed. Felipa reports feeding is going really well in general so far. She shared breastfeeding didn't go well with her last child but she's happy it is going well so far this time around.     Reviewed milk supply and engorgement. General questions answered regarding when to expect mature milk, and what feeding patterns look like until that time.  General questions answered regarding pumping, when it's helpful and necessary. Reviewed general recommendation to wait to start pumping until breastfeeding is well established unless there are feeding difficulties or engorgement not relieved by feeding baby or hand expression. Reviewed Milk storage guidelines page in postpartum booklet. Discussed introducing a bottle and recommendation to wait for bottle introduction for 3-4 weeks unless baby needs to supplement for medical reasons. Encouraged to review Breastfeeding section in Your Guide to Postpartum & [place] [facility]. Discussed typical  feeding patterns, cluster feeding, and ways to wake a sleepy baby for feedings.    Feeding plan: Recommend unlimited, frequent breast feedings: At least 8 - 12 times every 24 hours. Avoid pacifiers and supplementation with formula unless medically indicated. Encouraged use of feeding log and to record feedings, and void/stool patterns. Felipa has a pump for home use.  Encouraged to call with needs, will revisit as needed. Felipa very appreciative of visit.    Camilla Hutton, RN-C, IBCLC, MNN, PHN, BSN

## 2022-06-21 NOTE — OP NOTE
Procedure Date: 2022    PREOPERATIVE DIAGNOSES:  1.  Term intrauterine pregnancy at 37+3 weeks gestational age.  2.  Previous  section.  3.  Labor.      POSTOPERATIVE DIAGNOSES:    1.  Term intrauterine pregnancy at 37+3 weeks gestational age.  2.  Previous  section.  3.  Labor.    PROCEDURE PERFORMED:  Repeat low transverse  section via Pfannenstiel skin incision.    SURGEON:  Jodi Joel MD    ANESTHESIA:  Spinal.    ESTIMATED BLOOD LOSS:  500 mL.    DRAINS:  None.    SPECIMENS:  Umbilical cord blood and tissue.    OPERATIVE FINDINGS:  Female infant in left occiput transverse presentation, Apgars 9 and 9, weight 6 pounds and 9 ounces.  Normal uterus, normal bilateral fallopian tubes, normal right ovary.  Left ovary was adhered to the posterior uterus.    COMPLICATIONS:  None apparent at time of procedure.    INDICATIONS FOR PROCEDURE:  The patient is a 36-year-old  3, now para 3-0-0-3 who presented at 37 and 3 weeks with painful contractions.  The contractions decreased slightly in intensity but were still very painful.  The patient has a history of 2 prior  sections.  She did have heavier bleeding earlier in the morning prior to presenting.  The patient was counseled about repeat low transverse  section due to history of 2 prior  sections and painful uterine contractions.    DESCRIPTION OF PROCEDURE:  The patient was taken to the operating room, IV fluids running.  She received Ancef and azithromycin for infection prophylaxis.  She received a spinal anesthetic.  She was placed in dorsal lithotomy position with a left lateral tilt.  Fetal heart tones were auscultated.  A Josue catheter was placed to gravity.  She was then prepped and draped in the usual sterile fashion.  A final timeout was obtained.  The anesthetic was found to be adequate.  A Pfannenstiel skin incision was made in the lower abdomen, carried down to the level of the fascia.  The  fascia was incised in the midline and extended laterally.  The peritoneum was entered into sharply.  No intraabdominal adhesions were palpated.  The John retractor was placed for retraction.  A bladder blade was placed against the lower uterine segment.  A low transverse incision was made on the uterus.  Clear amniotic fluid was noted.  The infant was delivered via usual maneuvers in vertex presentation.  She was immediately vigorous; therefore, delayed cord clamping was done, and she was handed off to the awaiting  nurse.  The placenta was delivered via external massage of the uterus.  The uterus was cleaned of all clots and debris and was reapproximated in 2 layers using 0 Vicryl suture.  Additional figure-of-eight sutures were placed on the right and the left corner due to bleeding with good hemostasis noted.  Surgicel powder was then applied to the level of the hysterotomy and the peritoneum.  Bilateral fallopian tubes were noted to be normal.  The right ovary was clearly visualized and noted to be normal.  The left ovary was noted to be adhered to the posterior surface of the uterus.  Next, the John retractor was removed, with good hemostasis of the hysterotomy.  The peritoneum was reapproximated using 2-0 chromic in a running fashion.  The fascia was reapproximated using 0 Vicryl suture in a running fashion.  The subcutaneous tissue was irrigated and reapproximated using 2-0 plain gut in interrupted fashion.  The skin was then reapproximated in a subcuticular fashion using 4-0 Monocryl.  At the end of the procedure, all instrument counts were noted to be correct.  The patient tolerated the procedure well and was taken to recovery in stable condition.    Jodi Joel MD        D: 2022   T: 2022   MT: ANTHONY    Name:     BIJAL HOWARD  MRN:      [id number]        Account:        [id number]   :      1985           Procedure Date: 2022     Document:  [id number]

## 2022-06-21 NOTE — PLAN OF CARE
Patient arrived to floor Via cart. Baby was at bedside. Patient had nausea and Reglan was given with relief. Emesis x2. Taking ice chips. Josue patent and cath cares done. VSS and  O2 sats. Bowels sound positive. No flatus. Scheduled Toradol was given in OR.  Dressing Marked and still the same. Turned side to side to help promote flatus.

## 2022-06-22 VITALS
SYSTOLIC BLOOD PRESSURE: 114 MMHG | RESPIRATION RATE: 16 BRPM | WEIGHT: 190 LBS | OXYGEN SATURATION: 97 % | HEIGHT: 65 IN | HEART RATE: 83 BPM | TEMPERATURE: 98.5 F | BODY MASS INDEX: 31.65 KG/M2 | DIASTOLIC BLOOD PRESSURE: 69 MMHG

## 2022-06-22 PROCEDURE — 250N000013 HC RX MED GY IP 250 OP 250 PS 637: Performed by: OBSTETRICS & GYNECOLOGY

## 2022-06-22 RX ORDER — AMOXICILLIN 250 MG
2 CAPSULE ORAL 2 TIMES DAILY
Qty: 60 TABLET | Refills: 0 | Status: SHIPPED | OUTPATIENT
Start: 2022-06-22 | End: 2023-03-14

## 2022-06-22 RX ORDER — OXYCODONE HYDROCHLORIDE 5 MG/1
5 TABLET ORAL EVERY 4 HOURS PRN
Qty: 20 TABLET | Refills: 0 | Status: SHIPPED | OUTPATIENT
Start: 2022-06-22 | End: 2023-03-14

## 2022-06-22 RX ORDER — ACETAMINOPHEN 325 MG/1
650 TABLET ORAL EVERY 4 HOURS PRN
Qty: 30 TABLET | Refills: 0 | Status: SHIPPED | OUTPATIENT
Start: 2022-06-23 | End: 2023-03-14

## 2022-06-22 RX ORDER — IBUPROFEN 800 MG/1
800 TABLET, FILM COATED ORAL EVERY 6 HOURS PRN
Qty: 30 TABLET | Refills: 0 | Status: SHIPPED | OUTPATIENT
Start: 2022-06-22 | End: 2023-03-14

## 2022-06-22 RX ADMIN — ACETAMINOPHEN 975 MG: 325 TABLET ORAL at 11:33

## 2022-06-22 RX ADMIN — IBUPROFEN 800 MG: 400 TABLET, FILM COATED ORAL at 11:33

## 2022-06-22 RX ADMIN — ACETAMINOPHEN 975 MG: 325 TABLET ORAL at 00:24

## 2022-06-22 RX ADMIN — IBUPROFEN 800 MG: 400 TABLET, FILM COATED ORAL at 05:54

## 2022-06-22 RX ADMIN — OXYCODONE HYDROCHLORIDE 5 MG: 5 TABLET ORAL at 13:46

## 2022-06-22 RX ADMIN — ACETAMINOPHEN 975 MG: 325 TABLET ORAL at 05:53

## 2022-06-22 ASSESSMENT — ACTIVITIES OF DAILY LIVING (ADL)
ADLS_ACUITY_SCORE: 31

## 2022-06-22 NOTE — LACTATION NOTE
"Routine visit. Breastfeeding well. History of poor breastfeeding and milk production.  Only  her now 2 year old for 2 weeks and desires \"to breastfeed as long as I can\".  Instructed to pump after sleepy feedings and or hand express to increase stimulation.  Getting ready for discharge.  Plan: Watch for feeding cues and feed every 2-3 hours and/or on demand. Continue to use feeding log to track intake and appropriate voids and stools. Take feeding log to first follow up appointment or weight check. Encourage skin to skin to promote frequent feedings, thermoregulation and bonding. Follow-up with healthcare provider or lactation consultant for questions or concerns.     Instructed on signs/symptoms of engorgement/ plugged ducts and mastitis.  Instructed on comfort measures and when to call MD.  Questions answered regarding pumping and physiology of milk supply and production,  has a breast pump for home. Outpatient resources reviewed and planning to follow up at [facility].    "

## 2022-06-22 NOTE — CONSULTS
"SWS Progress Note    Data: SW consult for postpartum assessment due to score of 11 on the EPDS. Pt is Felipa, a 37 y/o who delivered her baby on 6/20/22 at term.  Intervention: SW has reviewed pt records. SW met with pt this morning to introduce self/role, perform assessment, and discuss resources. SW inquired about pt mental health history, pt shared she has not needed professional support in the past, but has had small episodes of mild depression previously. Pt has not had experience with postpartum depression/anxiety. She said she sometimes feels a bit overwhelmed or sad, but it passes without issue quickly. Pt has not been on medications in the past. SW normalized \"rollercoaster\" of emotions that may occur following delivery as well as discussed s/s that may be a concern for potential PPD/PPA. Pt has insight on the s/s to look for, SW discussed techniques for coping and the importance of family awareness and support. SW also encouraged pt to alert her MD of any concerns at her follow-up appointment. SW discussed PPD/PPA resource folder and provided brief overview of information included. Pt appreciative of the resources. Pt feels prepared for discharge and has no additional questions/concerns.  Assessment: Pt pleasant and welcoming of SW involvement. Pt observed to have  affect during conversation. SW allowed space for pt to share thoughts/concerns re: PPD/PPA. SW provided reflective listening and supportive counseling. Parents are supportive of one another, bonding well with baby, no concerns.  Plan: No further SW follow-up indicated. Pt and baby to discharge today with above mentioned resources. SW provided this writer's contact information if any specific questions arise about the resources provided.    "

## 2022-06-22 NOTE — PLAN OF CARE
Vital signs stable. Incision well approximated steri strips intact.Voiding without difficulty. Lung sounds clear and equal. Using tylenol and ibuprofen for pain management. Up ambulating without dizziness. Breastfeeding on demand. Encouraged to call with questions/concerns. Will continue to monitor. Patient will discharge today. SW saw the patient for Depression screen with score of 11 and patient has the resources.

## 2022-06-22 NOTE — PLAN OF CARE
Vital signs stable. Incision well approximated.Voiding without difficulty. Lung sounds clear and equal. Using tylenol and ibuprofen for pain management. Up ambulating without dizziness. Breastfeeding on demand. Encouraged to call with questions/concerns. Will continue to monitor.

## 2022-06-22 NOTE — PROGRESS NOTES
[facility] Obstetrics Post-Op / Progress Note         Assessment and Plan:    Assessment:   Post-operative day #2  Low transverse repeat  section  L&D complications: None      Doing well.  Acute blood loss anemia from surgery. Asymptomatic with no need for additional intervention beside prenatal vitamins.  Clean wound without signs of infection.  Pain well-controlled.      Plan:    consult for high depression score  Ok to discharge home today if infant is cleared by Pediatrician after choking episode yesterday  Follow up in 2 weeks             Interval History:   Pain is present but well controlled with oral pain meds. No light headedness or dizziness. BM X 2 already. Feels well enough to discharge home today          Significant Problems:    None          Review of Systems:    The Review of Systems is negative other than noted in the HPI          Medications:     Current Facility-Administered Medications   Medication     [START ON 2022] acetaminophen (TYLENOL) tablet 650 mg     acetaminophen (TYLENOL) tablet 975 mg     bisacodyl (DULCOLAX) Suppository 10 mg     carboprost (HEMABATE) injection 250 mcg     dextrose 5% in lactated ringers infusion     diphenhydrAMINE (BENADRYL) capsule 25 mg    Or     diphenhydrAMINE (BENADRYL) injection 25 mg     hydrocortisone (Perianal) (ANUSOL-HC) 2.5 % cream     HYDROmorphone (DILAUDID) injection 0.3-0.5 mg     ibuprofen (ADVIL/MOTRIN) tablet 800 mg     lanolin cream     lidocaine (LMX4) cream     lidocaine 1 % 0.1-1 mL     magnesium hydroxide (MILK OF MAGNESIA) suspension 30 mL     methylergonovine (METHERGINE) injection 200 mcg     metoclopramide (REGLAN) injection 10 mg    Or     metoclopramide (REGLAN) tablet 10 mg     misoprostol (CYTOTEC) tablet 400 mcg    Or     misoprostol (CYTOTEC) tablet 800 mcg     nalbuphine (NUBAIN) injection 2.5-5 mg     naloxone (NARCAN) injection 0.2 mg    Or     naloxone (NARCAN) injection 0.4 mg    Or     naloxone  (NARCAN) injection 0.2 mg    Or     naloxone (NARCAN) injection 0.4 mg     No MMR Needed -  Assessment: Patient does not need MMR vaccine     No Tdap Needed - Assessment: Patient does not need Tdap vaccine     ondansetron (ZOFRAN ODT) ODT tab 4 mg    Or     ondansetron (ZOFRAN) injection 4 mg     oxyCODONE (ROXICODONE) tablet 5 mg     oxytocin (PITOCIN) 30 units in 500 mL 0.9% NaCl infusion     oxytocin (PITOCIN) 30 units in 500 mL 0.9% NaCl infusion     oxytocin (PITOCIN) injection 10 Units     oxytocin (PITOCIN) injection 10 Units     prochlorperazine (COMPAZINE) injection 10 mg    Or     prochlorperazine (COMPAZINE) tablet 10 mg    Or     prochlorperazine (COMPAZINE) suppository 25 mg     senna-docusate (SENOKOT-S/PERICOLACE) 8.6-50 MG per tablet 1 tablet    Or     senna-docusate (SENOKOT-S/PERICOLACE) 8.6-50 MG per tablet 2 tablet     simethicone (MYLICON) chewable tablet 80 mg     sodium chloride (PF) 0.9% PF flush 3 mL     sodium chloride (PF) 0.9% PF flush 3 mL     sodium phosphate (FLEET ENEMA) 1 enema     tranexamic acid 1 g in 100 mL NS IV bag (premix)             Physical Exam:     Patient Vitals for the past 24 hrs:   BP Temp Temp src Pulse Resp SpO2   06/22/22 0700 114/69 98.5  F (36.9  C) Oral 83 16 --   06/21/22 2315 106/63 98.5  F (36.9  C) Oral 65 16 --   06/21/22 1809 -- -- -- -- 18 --   06/21/22 1514 107/60 98  F (36.7  C) -- 85 18 --   06/21/22 1201 90/56 98  F (36.7  C) Oral 86 18 97 %   06/21/22 1012 -- -- -- -- 18 96 %   06/21/22 0835 106/56 97.7  F (36.5  C) Oral 76 18 --     GEN: NAD  GI: soft, non-distended. Incision is clean, dry and intact. Fundus is well below the umbilicus.          Data:     Hemoglobin   Date Value Ref Range Status   06/21/2022 9.2 (L) 11.7 - 15.7 g/dL Final   06/20/2022 11.3 (L) 11.7 - 15.7 g/dL Final   04/18/2022 11.7 11.7 - 15.7 g/dL Final   11/19/2021 13.8 11.7 - 15.7 g/dL Final   11/21/2018 9.9 (L) 11.7 - 15.7 g/dL Final   11/20/2018 10.3 (L) 11.7 - 15.7 g/dL  Final   11/19/2018 12.2 11.7 - 15.7 g/dL Final   09/11/2018 11.6 (L) 11.7 - 15.7 g/dL Final   04/23/2018 13.3 11.7 - 15.7 g/dL Final     -    Jodi Joel MD

## 2022-06-23 NOTE — DISCHARGE SUMMARY
[facility] Discharge Summary    Felipa Bermudez MRN# [id number]   Age: 36 year old YOB: 1985     Date of Admission:  2022  Date of Discharge::  2022  3:38 PM  Admitting Physician:  Jodi Joel MD  Discharge Physician:  Jodi Joel MD     [facility] [place] [place]          Admission Diagnoses:   IUP at 37+3wga  Previous  Section X 2  Labor          Discharge Diagnosis:   Postpartum          Procedures:   Procedure(s): Repeat low transverse  section       -           Medications Prior to Admission:     Prenatal Vit-Fe Sulfate-FA-DHA (PRENATAL VITAMIN/MIN +DHA) 27-0.8-200 MG CAPS Take 1 tablet by mouth daily, Historical                   Discharge Medications:     Discharge Medication List as of 2022  1:15 PM      START taking these medications    Details   acetaminophen (TYLENOL) 325 MG tablet Take 2 tablets (650 mg) by mouth every 4 hours as needed for mild pain, Disp-30 tablet, R-0, E-Prescribe      ibuprofen (ADVIL/MOTRIN) 800 MG tablet Take 1 tablet (800 mg) by mouth every 6 hours as needed for other or moderate pain (cramping), Disp-30 tablet, R-0, E-Prescribe      oxyCODONE (ROXICODONE) 5 MG tablet Take 1 tablet (5 mg) by mouth every 4 hours as needed for severe pain, Disp-20 tablet, R-0, E-Prescribe      senna-docusate (SENOKOT-S/PERICOLACE) 8.6-50 MG tablet Take 2 tablets by mouth 2 times daily, Disp-60 tablet, R-0, E-Prescribe         CONTINUE these medications which have NOT CHANGED    Details   Prenatal Vit-Fe Sulfate-FA-DHA (PRENATAL VITAMIN/MIN +DHA) 27-0.8-200 MG CAPS Take 1 tablet by mouth daily, Historical                   Consultations:   No consultations were requested during this admission          Brief History of Labor or Admission:   Felipa presented to the hospital with painful uterine contractions. The contractions persisted in intensity despite 2 hours of monitoring. She was  consented for a repeat  section in the setting of labor.           Hospital Course:   The patient's hospital course was unremarkable.  She recovered as anticipated and experienced no post-operative complications.  On discharge, her pain was well controlled. Vaginal bleeding is similar to peak menstrual flow.  Voiding without difficulty.  Ambulating well and tolerating a normal diet.  No fever or significant wound drainage.  Breastfeeding well.  Infant is stable.  She had two bowel movements.  She was discharged on post-partum day #2.    Post-partum hemoglobin:   Hemoglobin   Date Value Ref Range Status   2022 9.2 (L) 11.7 - 15.7 g/dL Final   2018 9.9 (L) 11.7 - 15.7 g/dL Final             Discharge Instructions and Follow-Up:   Discharge diet: Regular   Discharge activity: Lifting restricted to 15 pounds   Discharge follow-up: Follow up with me in 2 weeks for a mood check   Wound care: Keep wound clean and dry           Discharge Disposition:   Discharged to home          Jodi Joel MD

## 2022-07-07 ENCOUNTER — TELEPHONE (OUTPATIENT)
Dept: OBGYN | Facility: CLINIC | Age: 37
End: 2022-07-07

## 2022-07-07 ENCOUNTER — OFFICE VISIT (OUTPATIENT)
Dept: OBGYN | Facility: CLINIC | Age: 37
End: 2022-07-07
Payer: COMMERCIAL

## 2022-07-07 VITALS
SYSTOLIC BLOOD PRESSURE: 102 MMHG | BODY MASS INDEX: 27.52 KG/M2 | HEIGHT: 65 IN | TEMPERATURE: 98.7 F | DIASTOLIC BLOOD PRESSURE: 72 MMHG | WEIGHT: 165.2 LBS

## 2022-07-07 DIAGNOSIS — Z98.890 POSTOPERATIVE STATE: Primary | ICD-10-CM

## 2022-07-07 PROCEDURE — 99024 POSTOP FOLLOW-UP VISIT: CPT | Performed by: OBSTETRICS & GYNECOLOGY

## 2022-07-07 NOTE — TELEPHONE ENCOUNTER
Type of Paperwork received:  FMLA    Date Rcvd:  7/7/2022    Rcvd From (Company name): [employer]    Provider:  Dr. Joel    Placed on Provider Cart Date:  7/7/2022

## 2022-07-07 NOTE — PROGRESS NOTES
SUBJECTIVE:                                                   Felipa Bermudez is a 36 year old female who presents to clinic today for the following health issue(s):  Patient presents with:  Surgical Followup:  22. Patient states that the incision site is painful.       HPI:  Felipa is here for an incision check. She has more soreness on the right side of her incision and feels more lumpy. She still has pain medication. She is here with her daughter who is doing well overall. She is back up to her delivery weight.    Patient's last menstrual period was 10/01/2021..    reports that she has never smoked. She has never used smokeless tobacco.    Health maintenance updated:  yes    Today's PHQ-2 Score:   PHQ-2 (  [employer]) 2021   Q1: Little interest or pleasure in doing things 0   Q2: Feeling down, depressed or hopeless 0   PHQ-2 Score 0   PHQ-2 Total Score (12-17 Years)- Positive if 3 or more points; Administer PHQ-A if positive 0     Today's PHQ-9 Score:   PHQ-9 SCORE 2019   PHQ-9 Total Score 3     Today's ZORAIDA-7 Score:   ZORAIDA-7 SCORE 2019   Total Score 3       Problem list and histories reviewed & adjusted, as indicated.  Additional history: as documented.    Patient Active Problem List   Diagnosis     Tension headache 2ndary to contusion of post occiput 10-17-14      HSV-2 infection     Back pain     Supervision of high risk pregnancy in first trimester     Group B Streptococcus urinary tract infection affecting pregnancy in first trimester     LGSIL on Pap smear of cervix     Acne     Vaginal bleeding     Labor and delivery, indication for care      delivery delivered     Past Surgical History:   Procedure Laterality Date      SECTION N/A 2018    Procedure:  SECTION;  Surgeon: Jodi Joel MD;  Location:  L+D      SECTION N/A 2022    Procedure: REPEAT  SECTION;  Surgeon: Jodi Joel MD;   "Location:  L+D     [facility]  DELIVERY ONLY  03    , Low Cervical      Social History     Tobacco Use     Smoking status: Never Smoker     Smokeless tobacco: Never Used   Substance Use Topics     Alcohol use: No      Problem (# of Occurrences) Relation (Name,Age of Onset)    Family History Negative (2) Mother, Father    Hypertension (1) Maternal Grandmother            Current Outpatient Medications   Medication Sig     acetaminophen (TYLENOL) 325 MG tablet Take 2 tablets (650 mg) by mouth every 4 hours as needed for mild pain     ibuprofen (ADVIL/MOTRIN) 800 MG tablet Take 1 tablet (800 mg) by mouth every 6 hours as needed for other or moderate pain (cramping)     oxyCODONE (ROXICODONE) 5 MG tablet Take 1 tablet (5 mg) by mouth every 4 hours as needed for severe pain (Patient not taking: Reported on 2022)     Prenatal Vit-Fe Sulfate-FA-DHA (PRENATAL VITAMIN/MIN +DHA) 27-0.8-200 MG CAPS Take 1 tablet by mouth daily (Patient not taking: Reported on 2022)     senna-docusate (SENOKOT-S/PERICOLACE) 8.6-50 MG tablet Take 2 tablets by mouth 2 times daily     No current facility-administered medications for this visit.     Allergies   Allergen Reactions     No Known Allergies        ROS:  12 point review of systems negative other than symptoms noted below or in the HPI.  No urinary frequency or dysuria, bladder or kidney problems      OBJECTIVE:     /72   Temp 98.7  F (37.1  C)   Ht 1.651 m (5' 5\")   Wt 74.9 kg (165 lb 3.2 oz)   LMP 10/01/2021   Breastfeeding Yes   BMI 27.49 kg/m    Body mass index is 27.49 kg/m .    Exam:  Constitutional:  Appearance: Well nourished, well developed alert, in no acute distress  Skin: General Inspection:  No rashes present, no lesions present, no areas of discoloration.  Incision is healing well. No separation. Visible skin suture removed from the right corner. No erythema. No discharge.  Neurologic:  Mental Status:  Oriented X3.  Normal strength and " tone, sensory exam grossly normal, mentation intact and speech normal.    Psychiatric:  Mentation appears normal and affect normal/bright.     In-Clinic Test Results:  No results found for this or any previous visit (from the past 24 hour(s)).    ASSESSMENT/PLAN:                                                        ICD-10-CM    1. Postoperative state  Z98.890      No concern for infection. Denies depressed mood. Roderick is adjusting to his new sister. She is not getting enough sleep but feels ok overall and has good support.    RTC in 4 weeks for a PP check.    Jodi Joel MD  [facility] [place]

## 2022-08-15 NOTE — PROGRESS NOTES
"SUBJECTIVE:  Felipa Bermudez,  is here for a postpartum visit.  She had a delivery type not specified on 2022 delivering a healthy baby girl, named Tameka Bermudez weighing 6 lbs 9.1 oz at term.      HPI:  Felipa is here with Tameka and Roderick. She is doing ok overall. She is tired from poor sleep due to  cares. Roderick is also requiring a lot of cares as well and her support team are back to work. She is not interested in contraception although she does not want another child. She is still breast feeding. Annual was completed in 2021 at the time of her new OB.   Last PHQ-9 score on record=   PHQ-9 SCORE 2022   PHQ-9 Total Score 4     ZORAIDA-7 SCORE 2018   Total Score 8 3 5       Pap:   Lab Results   Component Value Date    PAP NIL; (-) HPV 16/18/HR HPV 2021       Delivery complications:  No  Breast feeding:  Yes,   Bladder problems:  No. But noticed some dark colored discharge on yesterday (8/15/2022)  Bowel problems/hemorrhoids:  Yes,   Episiotomy/laceration/incision healed? Yes:   Vaginal flow:  None  [place]:  Yes, three days ago  Contraception: none  Emotional adjustment:  doing well, happy and tired  Back to work:  Sometime in 2022    12 point review of systems negative other than symptoms noted below or in the HPI.    OBJECTIVE:  Vitals: /62   Ht 1.651 m (5' 5\")   Wt 73.1 kg (161 lb 3.2 oz)   LMP 10/01/2021   Breastfeeding Yes   BMI 26.83 kg/m    BMI= Body mass index is 26.83 kg/m .  General - pleasant female in no acute distress.  Breast -  deferred  Abdomen - Incision well-healed  Pelvic - deferred  Rectovaginal - deferred.    ASSESSMENT:  No diagnosis found.    PLAN:  May resume normal activities without restrictions.  Pap smear was not done today.    Full counseling was provided, and all questions were answered.   No concern for postpartum depression.    Jodi Joel MD      "

## 2022-08-16 ENCOUNTER — PRENATAL OFFICE VISIT (OUTPATIENT)
Dept: OBGYN | Facility: CLINIC | Age: 37
End: 2022-08-16
Payer: COMMERCIAL

## 2022-08-16 VITALS
WEIGHT: 161.2 LBS | BODY MASS INDEX: 26.86 KG/M2 | HEIGHT: 65 IN | DIASTOLIC BLOOD PRESSURE: 62 MMHG | SYSTOLIC BLOOD PRESSURE: 105 MMHG

## 2022-08-16 PROCEDURE — 99207 PR POST PARTUM EXAM: CPT | Performed by: OBSTETRICS & GYNECOLOGY

## 2022-08-16 ASSESSMENT — ANXIETY QUESTIONNAIRES
6. BECOMING EASILY ANNOYED OR IRRITABLE: SEVERAL DAYS
2. NOT BEING ABLE TO STOP OR CONTROL WORRYING: SEVERAL DAYS
7. FEELING AFRAID AS IF SOMETHING AWFUL MIGHT HAPPEN: NOT AT ALL
5. BEING SO RESTLESS THAT IT IS HARD TO SIT STILL: SEVERAL DAYS
3. WORRYING TOO MUCH ABOUT DIFFERENT THINGS: SEVERAL DAYS
IF YOU CHECKED OFF ANY PROBLEMS ON THIS QUESTIONNAIRE, HOW DIFFICULT HAVE THESE PROBLEMS MADE IT FOR YOU TO DO YOUR WORK, TAKE CARE OF THINGS AT HOME, OR GET ALONG WITH OTHER PEOPLE: SOMEWHAT DIFFICULT
GAD7 TOTAL SCORE: 5
GAD7 TOTAL SCORE: 5
1. FEELING NERVOUS, ANXIOUS, OR ON EDGE: NOT AT ALL

## 2022-08-16 ASSESSMENT — PATIENT HEALTH QUESTIONNAIRE - PHQ9
5. POOR APPETITE OR OVEREATING: SEVERAL DAYS
SUM OF ALL RESPONSES TO PHQ QUESTIONS 1-9: 4

## 2022-09-22 NOTE — PROGRESS NOTES
Screening Questionnaire for Adult Immunization    Are you sick today?   No   Do you have allergies to medications, food, a vaccine component or latex?   No   Have you ever had a serious reaction after receiving a vaccination?   No   Do you have a long-term health problem with heart disease, lung disease, asthma, kidney disease, metabolic disease (e.g. diabetes), anemia, or other blood disorder?   No   Do you have cancer, leukemia, HIV/AIDS, or any other immune system problem?   No   In the past 3 months, have you taken medications that affect  your immune system, such as prednisone, other steroids, or anticancer drugs; drugs for the treatment of rheumatoid arthritis, Crohn s disease, or psoriasis; or have you had radiation treatments?   No   Have you had a seizure, or a brain or other nervous system problem?   No   During the past year, have you received a transfusion of blood or blood     products, or been given immune (gamma) globulin or antiviral drug?   No   For women: Are you pregnant or is there a chance you could become        pregnant during the next month?   No   Have you received any vaccinations in the past 4 weeks?   No     Immunization questionnaire answers were all negative.        Per orders of Dr. Joel, injection of Varicella given by Shruthi De La Cruz. Patient instructed to remain in clinic for 15 minutes afterwards, and to report any adverse reaction to me immediately.       Screening performed by Shruthi De La Cruz on 2/13/2019 at 3:37 PM.    
No assistance needed

## 2022-10-31 NOTE — PROGRESS NOTES
Patient was in the MAC one week ago b/c had bright red vaginal bleeding. No cramping and no trauma. Is on/off constipated but doesn't push actively to have a BM but does have prolonged sitting on toilet  Had had sex 2 days prior to that. But the blood was bright red and a fairly absorbed in red stain in her underwear but then fairly quickly stopped  Was having a bit of tan/brown mucous discharge until yesterday but now that has also stopped  No cramping. Hasn't been s.a since. No strenous physical activity and laying low. [place] [facility] [place] had normal [place] w/o contractions. Cervix was long and closed  Feeling tons of FM the last several weeks.  doptones easily heard today and uterus soft w/o contractions.  Reassured likely just a vessel on the cervix that bled after I.C. will avoid prolonged toileting and should do daily miralax. Can be s.a again now that spotting done but if happens again should then hold off until at least next visit with me for SSE to see if anything can get AGNO3 etc.  Return 4 weeks with GCT and CBC and discuss tdap.   ADDRESS SALPINGECTOMY AT NEXT VISIT   [No Acute Distress] : no acute distress [Well Nourished] : well nourished [No Respiratory Distress] : no respiratory distress  [Well Developed] : well developed [No Accessory Muscle Use] : no accessory muscle use [Clear to Auscultation] : lungs were clear to auscultation bilaterally [Normal Rate] : normal rate  [Regular Rhythm] : with a regular rhythm [Normal S1, S2] : normal S1 and S2 [No Murmur] : no murmur heard [No Edema] : there was no peripheral edema [Non-distended] : non-distended [de-identified] : Lateral 1st toenail with well demarcated plaque. No apparent increase in nail thickness. [de-identified] : Answering questions appropriately. Gets on exam table and ambulates without assistance

## 2022-11-21 ENCOUNTER — HEALTH MAINTENANCE LETTER (OUTPATIENT)
Age: 37
End: 2022-11-21

## 2023-03-13 NOTE — PROGRESS NOTES
Felipa is a 37 year old  female who presents for annual exam.     Besides routine health maintenance, she would like to discuss contraception     HPI:  Felipa is here for an annual exam. She delivered Tameka in 2022. Since delivery her menses have resumed. She had unprotected intercourse with her  on day 12 of her cycle and took plan B. She would like to discuss contraception. She is not yet back to work but is trying to explore returning to work part time on the weekends. She had intolerance to OCPs in the past but is not comfortable with a LARC at this time.      GYNECOLOGIC HISTORY:    Patient's last menstrual period was 2023 (exact date).    Regular menses? yes  Menses every 28 days.  Length of menses: 3-5 days    Her current contraception method is: none.  She  reports that she has never smoked. She has never used smokeless tobacco.    Patient is sexually active.  STD testing offered?  Declined  Last PHQ-9 score on record =   PHQ-9 SCORE 3/14/2023   PHQ-9 Total Score 3     Last GAD7 score on record =   ZORAIDA-7 SCORE 3/14/2023   Total Score 5     Alcohol Score = 0    HEALTH MAINTENANCE:  Cholesterol:   Cholesterol   Date Value Ref Range Status   2004 138 <200 mg/dL Final     Comment:     Cholesterol Reference Range:   <200  The NCEP recommends further         evaluation of:         1.  Patients with cholesterol             greater than 200 mg/dL             if additional risk factors             are present.         2.  All patients with a             cholesterol greater than             240 mg/dL.      Last Mammo: Not applicable, Result: Not applicable, Next Mammo: Due at age 39   Pap:  Lab Results   Component Value Date    GYNINTERP  2021     Negative for Intraepithelial Lesion or Malignancy (NILM), HPV NEG    PAP NIL 2018     Colonoscopy:  NA, Result: Not applicable, Next Colonoscopy: 45 years.  Dexa:  NA    Health maintenance updated:  Yes    HISTORY:  OB  History    Para Term  AB Living   3 3 3 0 0 3   SAB IAB Ectopic Multiple Live Births   0 0 0 0 3      # Outcome Date GA Lbr [place]/2nd Weight Sex Delivery Anes PTL Lv   3 Term 22 37w3d  2.98 kg (6 lb 9.1 oz) F CS-LTranv   CHERY      Name: Tameka      Apgar1: 9  Apgar5: 9   2 Term 18 38w2d  3.05 kg (6 lb 11.6 oz) M CS-LTranv EPI  CHERY      Complications: Other Excessive Bleeding, Cephalopelvic Disproportion      Name: Roderick      Apgar1: 8     1 Term 03 40w0d  3.26 kg (7 lb 3 oz) F CS-LTranv   CHERY      Birth Comments: epidural      Name: Lissett       Patient Active Problem List   Diagnosis     Tension headache 2ndary to contusion of post occiput 10-17-14      HSV-2 infection     Back pain     Supervision of high risk pregnancy in first trimester     Group B Streptococcus urinary tract infection affecting pregnancy in first trimester     LGSIL on Pap smear of cervix     Acne     Vaginal bleeding     Labor and delivery, indication for care      delivery delivered     Past Surgical History:   Procedure Laterality Date      SECTION N/A 2018    Procedure:  SECTION;  Surgeon: Jodi Joel MD;  Location:  L+D      SECTION N/A 2022    Procedure: REPEAT  SECTION;  Surgeon: Jodi Joel MD;  Location:  L+D     [facility]  DELIVERY ONLY  03    , Low Cervical      Social History     Tobacco Use     Smoking status: Never     Smokeless tobacco: Never   Substance Use Topics     Alcohol use: No      Problem (# of Occurrences) Relation (Name,Age of Onset)    Hypertension (1) Maternal Grandmother    Family History Negative (2) Mother, Father            Current Outpatient Medications   Medication Sig     norethindrone (MICRONOR) 0.35 MG tablet Take 1 tablet (0.35 mg) by mouth daily     No current facility-administered medications for this visit.     Allergies   Allergen Reactions     No Known Allergies   "      Past medical, surgical, social and family histories were reviewed and updated in EPIC.    ROS:   12 point review of systems negative other than symptoms noted below or in the HPI.  No urinary frequency or dysuria, bladder or kidney problems    EXAM:  /60   Ht 1.6 m (5' 3\")   Wt 76.2 kg (168 lb)   LMP 02/28/2023 (Exact Date)   Breastfeeding Yes   BMI 29.76 kg/m     BMI: Body mass index is 29.76 kg/m .    PHYSICAL EXAM:  Constitutional:   Appearance: Well nourished, well developed, alert, in no acute distress  Neck:  Lymph Nodes:  No lymphadenopathy present    Thyroid:  Gland size normal, nontender, no nodules or masses present  on palpation  Chest:  Respiratory Effort:  Breathing unlabored, CTAB  Cardiovascular:    Heart: Auscultation:  Regular rate, normal rhythm, no murmurs present  Breasts: Inspection of Breasts:  No lymphadenopathy present., Palpation of Breasts and Axillae:  No masses present on palpation, no breast tenderness., Axillary Lymph Nodes:  No lymphadenopathy present. and No nodularity, asymmetry or nipple discharge bilaterally.  Gastrointestinal:   Abdominal Examination:  Abdomen nontender to palpation, tone normal without rigidity or guarding, no masses present, umbilicus without lesions   Liver and Spleen:  No hepatomegaly present, liver nontender to palpation    Hernias:  No hernias present  Lymphatic: Lymph Nodes:  No other lymphadenopathy present  Skin:  General Inspection:  No rashes present, no lesions present, no areas of  discoloration  Neurologic:    Mental Status:  Oriented X3.  Normal strength and tone, sensory exam                grossly normal, mentation intact and speech normal.    Psychiatric:   Mentation appears normal and affect normal/bright.         Pelvic Exam:  External Genitalia:     Normal appearance for age, no discharge present, no tenderness present, no inflammatory lesions present, color normal  Vagina:     Normal vaginal vault without central or " paravaginal defects, no discharge present, no inflammatory lesions present, no masses present  Bladder:     Nontender to palpation  Urethra:   Urethral Body:  Urethra palpation normal, urethra structural support normal   Urethral Meatus:  No erythema or lesions present  Cervix:     Appearance healthy, no lesions present, nontender to palpation, no bleeding present  Uterus:     Uterus: firm, normal sized and nontender, anteverted in position.   Adnexa:     No adnexal tenderness present, no adnexal masses present  Perineum:     Perineum within normal limits, no evidence of trauma, no rashes or skin lesions present  Anus:     Anus within normal limits, no hemorrhoids present  Inguinal Lymph Nodes:     No lymphadenopathy present  Pubic Hair:     Normal pubic hair distribution for age  Genitalia and Groin:     No rashes present, no lesions present, no areas of discoloration, no masses present      COUNSELING:   Reviewed preventive health counseling, as reflected in patient instructions    BMI: Body mass index is 29.76 kg/m .      ASSESSMENT:  37 year old female with satisfactory annual exam.    ICD-10-CM    1. Encounter for gynecological examination without abnormal finding  Z01.419       2. Encounter for initial prescription of contraceptive pills  Z30.011 norethindrone (MICRONOR) 0.35 MG tablet      3. Lipid screening  Z13.220 Lipid Profile (Chol, Trig, HDL, LDL calc)      4. Screening for endocrine, metabolic and immunity disorder  Z13.29 Comprehensive metabolic panel (BMP + Alb, Alk Phos, ALT, AST, Total. Bili, TP)    Z13.228     Z13.0       5. Encounter for vitamin deficiency screening  Z13.21 Vitamin D Deficiency          PLAN:  Discussed the efficacy of the progesterone only pill and the need to take it at the same time every day.   Recommend screening labs today  Pap smear is due in one year due to MANUEL history. Do not recommend Q5 years.    Jodi Joel MD

## 2023-03-14 ENCOUNTER — OFFICE VISIT (OUTPATIENT)
Dept: OBGYN | Facility: CLINIC | Age: 38
End: 2023-03-14
Payer: COMMERCIAL

## 2023-03-14 VITALS
SYSTOLIC BLOOD PRESSURE: 110 MMHG | WEIGHT: 168 LBS | HEIGHT: 63 IN | BODY MASS INDEX: 29.77 KG/M2 | DIASTOLIC BLOOD PRESSURE: 60 MMHG

## 2023-03-14 DIAGNOSIS — Z30.011 ENCOUNTER FOR INITIAL PRESCRIPTION OF CONTRACEPTIVE PILLS: ICD-10-CM

## 2023-03-14 DIAGNOSIS — Z01.419 ENCOUNTER FOR GYNECOLOGICAL EXAMINATION WITHOUT ABNORMAL FINDING: Primary | ICD-10-CM

## 2023-03-14 DIAGNOSIS — Z13.21 ENCOUNTER FOR VITAMIN DEFICIENCY SCREENING: ICD-10-CM

## 2023-03-14 DIAGNOSIS — Z13.29 SCREENING FOR ENDOCRINE, METABOLIC AND IMMUNITY DISORDER: ICD-10-CM

## 2023-03-14 DIAGNOSIS — Z13.0 SCREENING FOR ENDOCRINE, METABOLIC AND IMMUNITY DISORDER: ICD-10-CM

## 2023-03-14 DIAGNOSIS — Z13.228 SCREENING FOR ENDOCRINE, METABOLIC AND IMMUNITY DISORDER: ICD-10-CM

## 2023-03-14 DIAGNOSIS — Z13.220 LIPID SCREENING: ICD-10-CM

## 2023-03-14 LAB
ALBUMIN SERPL BCG-MCNC: 4.7 G/DL (ref 3.5–5.2)
ALP SERPL-CCNC: 72 U/L (ref 35–104)
ALT SERPL W P-5'-P-CCNC: 15 U/L (ref 10–35)
ANION GAP SERPL CALCULATED.3IONS-SCNC: 16 MMOL/L (ref 7–15)
AST SERPL W P-5'-P-CCNC: 18 U/L (ref 10–35)
BILIRUB SERPL-MCNC: 0.4 MG/DL
BUN SERPL-MCNC: 15.4 MG/DL (ref 6–20)
CALCIUM SERPL-MCNC: 9.9 MG/DL (ref 8.6–10)
CHLORIDE SERPL-SCNC: 103 MMOL/L (ref 98–107)
CHOLEST SERPL-MCNC: 211 MG/DL
CREAT SERPL-MCNC: 0.54 MG/DL (ref 0.51–0.95)
DEPRECATED HCO3 PLAS-SCNC: 21 MMOL/L (ref 22–29)
GFR SERPL CREATININE-BSD FRML MDRD: >90 ML/MIN/1.73M2
GLUCOSE SERPL-MCNC: 78 MG/DL (ref 70–99)
HDLC SERPL-MCNC: 56 MG/DL
LDLC SERPL CALC-MCNC: 137 MG/DL
NONHDLC SERPL-MCNC: 155 MG/DL
POTASSIUM SERPL-SCNC: 4.2 MMOL/L (ref 3.4–5.3)
PROT SERPL-MCNC: 8.4 G/DL (ref 6.4–8.3)
SODIUM SERPL-SCNC: 140 MMOL/L (ref 136–145)
TRIGL SERPL-MCNC: 91 MG/DL

## 2023-03-14 PROCEDURE — 80061 LIPID PANEL: CPT | Performed by: OBSTETRICS & GYNECOLOGY

## 2023-03-14 PROCEDURE — 36415 COLL VENOUS BLD VENIPUNCTURE: CPT | Performed by: OBSTETRICS & GYNECOLOGY

## 2023-03-14 PROCEDURE — 99395 PREV VISIT EST AGE 18-39: CPT | Performed by: OBSTETRICS & GYNECOLOGY

## 2023-03-14 PROCEDURE — 80053 COMPREHEN METABOLIC PANEL: CPT | Performed by: OBSTETRICS & GYNECOLOGY

## 2023-03-14 PROCEDURE — 82306 VITAMIN D 25 HYDROXY: CPT | Performed by: OBSTETRICS & GYNECOLOGY

## 2023-03-14 RX ORDER — ACETAMINOPHEN AND CODEINE PHOSPHATE 120; 12 MG/5ML; MG/5ML
0.35 SOLUTION ORAL DAILY
Qty: 84 TABLET | Refills: 4 | Status: SHIPPED | OUTPATIENT
Start: 2023-03-14 | End: 2024-09-12

## 2023-03-14 ASSESSMENT — ANXIETY QUESTIONNAIRES
IF YOU CHECKED OFF ANY PROBLEMS ON THIS QUESTIONNAIRE, HOW DIFFICULT HAVE THESE PROBLEMS MADE IT FOR YOU TO DO YOUR WORK, TAKE CARE OF THINGS AT HOME, OR GET ALONG WITH OTHER PEOPLE: SOMEWHAT DIFFICULT
GAD7 TOTAL SCORE: 5
3. WORRYING TOO MUCH ABOUT DIFFERENT THINGS: SEVERAL DAYS
1. FEELING NERVOUS, ANXIOUS, OR ON EDGE: NOT AT ALL
2. NOT BEING ABLE TO STOP OR CONTROL WORRYING: SEVERAL DAYS
6. BECOMING EASILY ANNOYED OR IRRITABLE: MORE THAN HALF THE DAYS
GAD7 TOTAL SCORE: 5
5. BEING SO RESTLESS THAT IT IS HARD TO SIT STILL: NOT AT ALL
7. FEELING AFRAID AS IF SOMETHING AWFUL MIGHT HAPPEN: NOT AT ALL

## 2023-03-14 ASSESSMENT — PATIENT HEALTH QUESTIONNAIRE - PHQ9
5. POOR APPETITE OR OVEREATING: SEVERAL DAYS
SUM OF ALL RESPONSES TO PHQ QUESTIONS 1-9: 3

## 2023-03-16 ENCOUNTER — TELEPHONE (OUTPATIENT)
Dept: OBGYN | Facility: CLINIC | Age: 38
End: 2023-03-16
Payer: COMMERCIAL

## 2023-03-16 LAB — DEPRECATED CALCIDIOL+CALCIFEROL SERPL-MC: 20 UG/L (ref 20–75)

## 2023-03-16 NOTE — TELEPHONE ENCOUNTER
Returned patient call  Awaiting MD to review lab results and offer any recommendations  Will notify patient once this is done.  Pt verbalized understanding, in agreement with plan, and voiced no further questions.    Eugenia Knox RN on 3/16/2023 at 2:46 PM

## 2023-03-16 NOTE — TELEPHONE ENCOUNTER
Reason for call:  Results   Name of test or procedure: labs  Date of test or procedure: 03/15/23  Location of test or procedure: [facility]    Additional comments: pt would like a call with her results. She could not get them on [employer].    Phone number to reach patient:  Home number on file [phone] (home)    Best Time:  any    Can we leave a detailed message on this number?  YES    Travel screening: Not Applicable

## 2023-03-26 ENCOUNTER — APPOINTMENT (OUTPATIENT)
Dept: CT IMAGING | Facility: CLINIC | Age: 38
End: 2023-03-26
Attending: EMERGENCY MEDICINE
Payer: COMMERCIAL

## 2023-03-26 ENCOUNTER — OFFICE VISIT (OUTPATIENT)
Dept: URGENT CARE | Facility: URGENT CARE | Age: 38
End: 2023-03-26
Payer: COMMERCIAL

## 2023-03-26 ENCOUNTER — HOSPITAL ENCOUNTER (EMERGENCY)
Facility: CLINIC | Age: 38
Discharge: HOME | End: 2023-03-26
Attending: EMERGENCY MEDICINE | Admitting: EMERGENCY MEDICINE
Payer: COMMERCIAL

## 2023-03-26 VITALS
HEART RATE: 57 BPM | OXYGEN SATURATION: 99 % | BODY MASS INDEX: 26.66 KG/M2 | TEMPERATURE: 97.2 F | HEIGHT: 65 IN | WEIGHT: 160 LBS | SYSTOLIC BLOOD PRESSURE: 98 MMHG | RESPIRATION RATE: 16 BRPM | DIASTOLIC BLOOD PRESSURE: 55 MMHG

## 2023-03-26 VITALS
SYSTOLIC BLOOD PRESSURE: 100 MMHG | HEART RATE: 77 BPM | TEMPERATURE: 97.7 F | DIASTOLIC BLOOD PRESSURE: 62 MMHG | OXYGEN SATURATION: 99 %

## 2023-03-26 DIAGNOSIS — N12 PYELONEPHRITIS: ICD-10-CM

## 2023-03-26 DIAGNOSIS — N30.01 ACUTE CYSTITIS WITH HEMATURIA: ICD-10-CM

## 2023-03-26 DIAGNOSIS — R10.2 PELVIC PAIN IN FEMALE: Primary | ICD-10-CM

## 2023-03-26 LAB
ALBUMIN SERPL BCG-MCNC: 4.4 G/DL (ref 3.5–5.2)
ALBUMIN UR-MCNC: 70 MG/DL
ALP SERPL-CCNC: 109 U/L (ref 35–104)
ALT SERPL W P-5'-P-CCNC: 19 U/L (ref 10–35)
ANION GAP SERPL CALCULATED.3IONS-SCNC: 11 MMOL/L (ref 7–15)
APPEARANCE UR: ABNORMAL
AST SERPL W P-5'-P-CCNC: 15 U/L (ref 10–35)
BASOPHILS # BLD AUTO: 0.1 10E3/UL (ref 0–0.2)
BASOPHILS NFR BLD AUTO: 1 %
BILIRUB SERPL-MCNC: 0.9 MG/DL
BILIRUB UR QL STRIP: NEGATIVE
BUN SERPL-MCNC: 10.6 MG/DL (ref 6–20)
CALCIUM SERPL-MCNC: 9.7 MG/DL (ref 8.6–10)
CHLORIDE SERPL-SCNC: 102 MMOL/L (ref 98–107)
COLOR UR AUTO: ABNORMAL
CREAT SERPL-MCNC: 0.54 MG/DL (ref 0.51–0.95)
DEPRECATED HCO3 PLAS-SCNC: 27 MMOL/L (ref 22–29)
EOSINOPHIL # BLD AUTO: 0.3 10E3/UL (ref 0–0.7)
EOSINOPHIL NFR BLD AUTO: 3 %
ERYTHROCYTE [DISTWIDTH] IN BLOOD BY AUTOMATED COUNT: 12.6 % (ref 10–15)
GFR SERPL CREATININE-BSD FRML MDRD: >90 ML/MIN/1.73M2
GLUCOSE SERPL-MCNC: 86 MG/DL (ref 70–99)
GLUCOSE UR STRIP-MCNC: NEGATIVE MG/DL
HCG INTACT+B SERPL-ACNC: <1 MIU/ML
HCT VFR BLD AUTO: 41.3 % (ref 35–47)
HGB BLD-MCNC: 13.9 G/DL (ref 11.7–15.7)
HGB UR QL STRIP: ABNORMAL
HOLD SPECIMEN: NORMAL
IMM GRANULOCYTES # BLD: 0 10E3/UL
IMM GRANULOCYTES NFR BLD: 0 %
KETONES UR STRIP-MCNC: NEGATIVE MG/DL
LEUKOCYTE ESTERASE UR QL STRIP: ABNORMAL
LIPASE SERPL-CCNC: 42 U/L (ref 13–60)
LYMPHOCYTES # BLD AUTO: 2.5 10E3/UL (ref 0.8–5.3)
LYMPHOCYTES NFR BLD AUTO: 23 %
MCH RBC QN AUTO: 29.2 PG (ref 26.5–33)
MCHC RBC AUTO-ENTMCNC: 33.7 G/DL (ref 31.5–36.5)
MCV RBC AUTO: 87 FL (ref 78–100)
MONOCYTES # BLD AUTO: 0.8 10E3/UL (ref 0–1.3)
MONOCYTES NFR BLD AUTO: 7 %
MUCOUS THREADS #/AREA URNS LPF: PRESENT /LPF
NEUTROPHILS # BLD AUTO: 7.1 10E3/UL (ref 1.6–8.3)
NEUTROPHILS NFR BLD AUTO: 66 %
NITRATE UR QL: NEGATIVE
NRBC # BLD AUTO: 0 10E3/UL
NRBC BLD AUTO-RTO: 0 /100
PH UR STRIP: 8 [PH] (ref 5–7)
PLATELET # BLD AUTO: 333 10E3/UL (ref 150–450)
POTASSIUM SERPL-SCNC: 4.1 MMOL/L (ref 3.4–5.3)
PROT SERPL-MCNC: 8.4 G/DL (ref 6.4–8.3)
RBC # BLD AUTO: 4.76 10E6/UL (ref 3.8–5.2)
RBC URINE: >182 /HPF
SODIUM SERPL-SCNC: 140 MMOL/L (ref 136–145)
SP GR UR STRIP: 1.01 (ref 1–1.03)
UROBILINOGEN UR STRIP-MCNC: NORMAL MG/DL
WBC # BLD AUTO: 10.7 10E3/UL (ref 4–11)
WBC CLUMPS #/AREA URNS HPF: PRESENT /HPF
WBC URINE: >182 /HPF

## 2023-03-26 PROCEDURE — 87186 SC STD MICRODIL/AGAR DIL: CPT | Performed by: EMERGENCY MEDICINE

## 2023-03-26 PROCEDURE — 250N000011 HC RX IP 250 OP 636: Performed by: EMERGENCY MEDICINE

## 2023-03-26 PROCEDURE — 36415 COLL VENOUS BLD VENIPUNCTURE: CPT | Performed by: EMERGENCY MEDICINE

## 2023-03-26 PROCEDURE — 83690 ASSAY OF LIPASE: CPT | Performed by: EMERGENCY MEDICINE

## 2023-03-26 PROCEDURE — 258N000003 HC RX IP 258 OP 636: Performed by: EMERGENCY MEDICINE

## 2023-03-26 PROCEDURE — 80053 COMPREHEN METABOLIC PANEL: CPT | Performed by: EMERGENCY MEDICINE

## 2023-03-26 PROCEDURE — 99285 EMERGENCY DEPT VISIT HI MDM: CPT | Mod: 25

## 2023-03-26 PROCEDURE — 85025 COMPLETE CBC W/AUTO DIFF WBC: CPT | Performed by: EMERGENCY MEDICINE

## 2023-03-26 PROCEDURE — 84702 CHORIONIC GONADOTROPIN TEST: CPT | Performed by: EMERGENCY MEDICINE

## 2023-03-26 PROCEDURE — 74177 CT ABD & PELVIS W/CONTRAST: CPT

## 2023-03-26 PROCEDURE — 99214 OFFICE O/P EST MOD 30 MIN: CPT | Performed by: FAMILY MEDICINE

## 2023-03-26 PROCEDURE — 96361 HYDRATE IV INFUSION ADD-ON: CPT

## 2023-03-26 PROCEDURE — 81001 URINALYSIS AUTO W/SCOPE: CPT | Performed by: EMERGENCY MEDICINE

## 2023-03-26 PROCEDURE — 96375 TX/PRO/DX INJ NEW DRUG ADDON: CPT

## 2023-03-26 PROCEDURE — 96365 THER/PROPH/DIAG IV INF INIT: CPT | Mod: 59

## 2023-03-26 PROCEDURE — 250N000009 HC RX 250: Performed by: EMERGENCY MEDICINE

## 2023-03-26 RX ORDER — HYDROCODONE BITARTRATE AND ACETAMINOPHEN 5; 325 MG/1; MG/1
1 TABLET ORAL EVERY 6 HOURS PRN
Qty: 12 TABLET | Refills: 0 | Status: SHIPPED | OUTPATIENT
Start: 2023-03-26 | End: 2023-03-29

## 2023-03-26 RX ORDER — ONDANSETRON 2 MG/ML
4 INJECTION INTRAMUSCULAR; INTRAVENOUS EVERY 30 MIN PRN
Status: DISCONTINUED | OUTPATIENT
Start: 2023-03-26 | End: 2023-03-26 | Stop reason: HOSPADM

## 2023-03-26 RX ORDER — CEFTRIAXONE 2 G/1
2 INJECTION, POWDER, FOR SOLUTION INTRAMUSCULAR; INTRAVENOUS ONCE
Status: COMPLETED | OUTPATIENT
Start: 2023-03-26 | End: 2023-03-26

## 2023-03-26 RX ORDER — MORPHINE SULFATE 4 MG/ML
4 INJECTION, SOLUTION INTRAMUSCULAR; INTRAVENOUS
Status: COMPLETED | OUTPATIENT
Start: 2023-03-26 | End: 2023-03-26

## 2023-03-26 RX ORDER — SODIUM CHLORIDE 9 MG/ML
INJECTION, SOLUTION INTRAVENOUS CONTINUOUS
Status: DISCONTINUED | OUTPATIENT
Start: 2023-03-26 | End: 2023-03-26 | Stop reason: HOSPADM

## 2023-03-26 RX ORDER — IOPAMIDOL 755 MG/ML
81 INJECTION, SOLUTION INTRAVASCULAR ONCE
Status: COMPLETED | OUTPATIENT
Start: 2023-03-26 | End: 2023-03-26

## 2023-03-26 RX ORDER — ONDANSETRON 4 MG/1
4 TABLET, ORALLY DISINTEGRATING ORAL EVERY 8 HOURS PRN
Qty: 10 TABLET | Refills: 0 | Status: SHIPPED | OUTPATIENT
Start: 2023-03-26 | End: 2023-03-29

## 2023-03-26 RX ORDER — CEPHALEXIN 500 MG/1
500 CAPSULE ORAL 3 TIMES DAILY
Qty: 30 CAPSULE | Refills: 0 | Status: SHIPPED | OUTPATIENT
Start: 2023-03-26 | End: 2023-04-05

## 2023-03-26 RX ADMIN — SODIUM CHLORIDE 1000 ML: 9 INJECTION, SOLUTION INTRAVENOUS at 12:51

## 2023-03-26 RX ADMIN — SODIUM CHLORIDE 63 ML: 9 INJECTION, SOLUTION INTRAVENOUS at 11:22

## 2023-03-26 RX ADMIN — CEFTRIAXONE SODIUM 2 G: 2 INJECTION, POWDER, FOR SOLUTION INTRAMUSCULAR; INTRAVENOUS at 12:51

## 2023-03-26 RX ADMIN — SODIUM CHLORIDE 1000 ML: 9 INJECTION, SOLUTION INTRAVENOUS at 10:47

## 2023-03-26 RX ADMIN — ONDANSETRON 4 MG: 2 INJECTION INTRAMUSCULAR; INTRAVENOUS at 10:55

## 2023-03-26 RX ADMIN — IOPAMIDOL 81 ML: 755 INJECTION, SOLUTION INTRAVENOUS at 11:21

## 2023-03-26 RX ADMIN — MORPHINE SULFATE 4 MG: 4 INJECTION, SOLUTION INTRAMUSCULAR; INTRAVENOUS at 10:49

## 2023-03-26 ASSESSMENT — ENCOUNTER SYMPTOMS
FEVER: 0
VOMITING: 0
ABDOMINAL PAIN: 1
HEADACHES: 0
DIARRHEA: 0
FLANK PAIN: 1
DYSURIA: 1

## 2023-03-26 ASSESSMENT — PAIN SCALES - GENERAL: PAINLEVEL: SEVERE PAIN (7)

## 2023-03-26 ASSESSMENT — ACTIVITIES OF DAILY LIVING (ADL)
ADLS_ACUITY_SCORE: 35
ADLS_ACUITY_SCORE: 35

## 2023-03-26 NOTE — PROGRESS NOTES
THIS IS A TRIAGE ENCOUNTER .  HPI:  Felipa Bermudez is a 37 year old female who is s/p  in 2022 who presents with the CC of gradual-onset, worsening severe pain just to the left of the midline pelvis  The pain is constant and interfering with sleep.   Patient feels that this current pain is different from previous menstrual periods (Her last menstrual period was on 2023 and was normal).  .      Pain is located in the midline pelvic area, with radiation to none. .  The pain is characterized as cramping and sharp, and at worst is a level 7 on a scale of 1-10.  Pain has been present since yesterday and is worsening.   EXACERBATING FACTORS: sitting,walking  RELIEVING FACTORS: nothing. .  ASSOCIATED SX: Patient noticed some stringy blood from the vagina.  There has been pain after urinating.    No fevers  No nausea/vomiting  No diarrhea  No new back pain.  Patient denies any possibility of STD such as gonorrhea/chlamydia      Last Menstrual Period:  Around 2023 with a normal menstrual period.        Past Medical History:   Diagnosis Date     Herpes      LGSIL on Pap smear of cervix 2004 NIL 04 LSIL 04 Colpo bx MANUEL I, ECC acellular 05 LSIL 05 Colpo ECC neg 14 NIL (Care Everywhere) 18 NIL pap, neg HPV 21 NIL pap, neg HPV. Plan: await provider     Current Outpatient Medications   Medication Sig Dispense Refill     norethindrone (MICRONOR) 0.35 MG tablet Take 1 tablet (0.35 mg) by mouth daily 84 tablet 4     Social History     Tobacco Use     Smoking status: Never     Smokeless tobacco: Never   Substance Use Topics     Alcohol use: No       ROS:  CONSTITUTIONAL:negative for fevers.    GI: negative for vomiting/nausea/diarrhea.    : positive for pelvic pain, and pain after urination.      OBJECTIVE:  VITALS:  /62   Pulse 77   Temp 97.7  F (36.5  C) (Tympanic)   LMP 2023 (Exact Date)   SpO2 99%   Breastfeeding Yes      LMP 02/28/2023 (Exact Date)   Breastfeeding Yes   GENERAL APPEARANCE: healthy, alert and in severe pain.  She is slightly hunched over due to the pain.    ABDOMEN:  soft, nontender, No masses and bowel sounds normal  GU_female: there is severe pain with palpation just to the left of the midline pelvis.    NEURO: Normal strength and tone, sensory exam grossly normal,  normal speech and mentation  SKIN: no suspicious lesions or rashes  BACK:  There is some pain at the left costovertebral angle region with percussion.     ASSESSMENT:  Pelvic Pain in Female (severe).      PLAN:  Patient will go to the [facility] emergency room for further evaluation.  I gave report to the emergency room nurse today at around 9:25 am.        Marty Mcgill MD

## 2023-03-26 NOTE — ED TRIAGE NOTES
Pt  went  Urgent care today for lower abd pain- started last night - also stated she has pain with urination - some left flank pain as well.   Pt also stated that 2 weeks ago she took the Plan B pill.    Triage Assessment     Row Name 03/26/23 1006       Triage Assessment (Adult)    Airway WDL WDL       Respiratory WDL    Respiratory WDL WDL       Skin Circulation/Temperature WDL    Skin Circulation/Temperature WDL WDL       Cardiac WDL    Cardiac WDL WDL       Peripheral/Neurovascular WDL    Peripheral Neurovascular WDL WDL       Cognitive/Neuro/Behavioral WDL    Cognitive/Neuro/Behavioral WDL WDL

## 2023-03-26 NOTE — ED PROVIDER NOTES
"  History     Chief Complaint:  Abdominal Pain       The history is provided by the patient.      Felipa Bermudez is a 37 year old female who presents with LLQ abdominal pain. She says that before the onset of abdominal pain last night, she experienced pelvic pain and dysuria. Patient says that the abdominal pain is made worse when breastfeeding her 9 month old child. She says that she started having vaginal bleeding today that seems different than menstrual bleeding, though notes that she was expecting the start of her menstrual period today. Patient is coming from urgent care and endorses left flank pain from her prior exam. She denies vomiting, diarrhea, fever, or headache.      Independent Historian:   None - Patient Only    Review of External Notes: Yes, reviewed urgent care note from earlier today as well as notes given by Valorie ewing over the phone with MD to MD report from Dr. Isabel LICEA:  Review of Systems   Constitutional: Negative for fever.   Gastrointestinal: Positive for abdominal pain (LLQ). Negative for diarrhea and vomiting.   Genitourinary: Positive for dysuria, flank pain (L), pelvic pain and vaginal bleeding.   Neurological: Negative for headaches.   All other systems reviewed and are negative.    Allergies:  No known allergies    Medications:    The patient is not currently taking any prescribed medications     Past Medical History:    Hypoglycemia    Past Surgical History:     section      Social History:  Patient presents alone via personal vehicle   PCP: Tara Underwood     Physical Exam     Patient Vitals for the past 24 hrs:   BP Temp Temp src Pulse Resp SpO2 Height Weight   23 1330 94/54 -- -- 57 -- 100 % -- --   23 1003 109/70 97.2  F (36.2  C) Temporal 86 16 100 % 1.651 m (5' 5\") 72.6 kg (160 lb)        Physical Exam  Vitals: reviewed by me  General: Pt seen on [facility], pleasant, cooperative, and alert to conversation, nondiaphoretic, not toxic, " uncomfortable appearing however.  Eyes: Tracking well, clear conjunctiva BL  ENT: MMM, midline trachea.   Lungs: No tachypnea, no accessory muscle use. No respiratory distress.   CV: Rate as above  Abd: Soft, significant suprapubic tenderness to palpation with distractible guarding.  Minimal left-sided CVA tenderness, no right-sided CVA tenderness.  MSK: no joint effusion.  No evidence of trauma  Skin: No rash  Neuro: Clear speech and no facial droop.  Psych: Not RIS, no e/o AH/VH      Emergency Department Course     Imaging:  CT Abdomen Pelvis w Contrast   Final Result   IMPRESSION:    1.  Circumferential bladder wall thickening. This may be due to degree of distention and/or infiltrative process such as cystitis.   2.  No hydronephrosis. No ureteral calculi. No diverticulitis.   3.  1.2 cm cystic-appearing lesion within the pancreatic neck. This may represent a branch duct IPMN versus cystic pancreatic neoplasm. Please see the recommendations delineated below.      REFERENCE:   Revisions of international consensus [place] guidelines for the management of IPMN of the pancreas. Pancreatology 2017;17(5):738-753.      Largest cyst between 10-20 mm: CT or MRI/MRCP every 6 months for 1 year and then yearly for 2 years and then every 2 years if no change.             Report per radiology    Laboratory:  Labs Ordered and Resulted from Time of ED Arrival to Time of ED Departure   ROUTINE UA WITH MICROSCOPIC REFLEX TO CULTURE - Abnormal       Result Value    Color Urine Straw      Appearance Urine Slightly Cloudy (*)     Glucose Urine Negative      Bilirubin Urine Negative      Ketones Urine Negative      Specific Gravity Urine 1.015      Blood Urine Large (*)     pH Urine 8.0 (*)     Protein Albumin Urine 70 (*)     Urobilinogen Urine Normal      Nitrite Urine Negative      Leukocyte Esterase Urine Large (*)     WBC Clumps Urine Present (*)     Mucus Urine Present (*)     RBC Urine >182 (*)     WBC Urine >182 (*)     COMPREHENSIVE METABOLIC PANEL - Abnormal    Sodium 140      Potassium 4.1      Chloride 102      Carbon Dioxide (CO2) 27      Anion Gap 11      Urea Nitrogen 10.6      Creatinine 0.54      Calcium 9.7      Glucose 86      Alkaline Phosphatase 109 (*)     AST 15      ALT 19      Protein Total 8.4 (*)     Albumin 4.4      Bilirubin Total 0.9      GFR Estimate >90     LIPASE - Normal    Lipase 42     HCG QUANTITATIVE PREGNANCY - Normal    hCG Quantitative <1     CBC WITH PLATELETS AND DIFFERENTIAL    WBC Count 10.7      RBC Count 4.76      Hemoglobin 13.9      Hematocrit 41.3      MCV 87      MCH 29.2      MCHC 33.7      RDW 12.6      Platelet Count 333      % Neutrophils 66      % Lymphocytes 23      % Monocytes 7      % Eosinophils 3      % Basophils 1      % Immature Granulocytes 0      NRBCs per 100 WBC 0      Absolute Neutrophils 7.1      Absolute Lymphocytes 2.5      Absolute Monocytes 0.8      Absolute Eosinophils 0.3      Absolute Basophils 0.1      Absolute Immature Granulocytes 0.0      Absolute NRBCs 0.0     URINE CULTURE        Emergency Department Course & Assessments:     Interventions:  Medications   0.9% sodium chloride BOLUS (0 mLs Intravenous Stopped 3/26/23 1146)     Followed by   sodium chloride 0.9% infusion ( Intravenous Canceled Entry 3/26/23 1342)   ondansetron (ZOFRAN) injection 4 mg ( Intravenous Canceled Entry 3/26/23 1342)   0.9% sodium chloride BOLUS ( Intravenous Canceled Entry 3/26/23 1341)     Followed by   sodium chloride 0.9% infusion ( Intravenous Canceled Entry 3/26/23 1341)   0.9% sodium chloride BOLUS (1,000 mLs Intravenous $New Bag 3/26/23 1251)   morphine (PF) injection 4 mg (4 mg Intravenous $Given 3/26/23 1049)   Saline flush (63 mLs Intravenous $Given 3/26/23 1122)   iopamidol (ISOVUE-370) solution 81 mL (81 mLs Intravenous $Given 3/26/23 1121)   cefTRIAXone (ROCEPHIN) 2 g vial to attach to  ml bag for ADULTS or NS 50 ml bag for PEDS (2 g Intravenous $New Bag 3/26/23  1251)        Assessments:  1036 I obtained history and examined the patient as noted above.   1326 I rechecked and updated the patient. At this point I feel that the patient is safe for discharge, and the patient agrees.    Consultations/Discussion of Management or Tests:  None      Social Determinants of Health affecting care:   None    Disposition:  The patient was discharged to home.     Impression & Plan      Medical Decision Making:  This is a very pleasant 37-year-old female who presents to the emergency room with abdominal pain, likely cystitis with early pyelonephritis certainly possible based on my exam and the CT scan.  Urinalysis certainly shows an infection and hematuria, and she did receive 1 dose of ceftriaxone here, as well as IV fluid and pain control.  She tells me she feels comfortable going home, there is no vomiting, and I do think that she is stable for outpatient management.  I do not see any evidence of sepsis induced hypotension, thankfully she is afebrile here, not tachycardic, tolerating orals, and does not have a white count.  We talked about the projected disease course of cystitis and early Miles and how she needs to take her antibiotics for 10 days and also follow-up with her regular physician later this week.  Red flags for when to come back to the ER were discussed in detail.    Diagnosis:    ICD-10-CM    1. Acute cystitis with hematuria  N30.01       2. Pyelonephritis  N12            Discharge Medications:  New Prescriptions    CEPHALEXIN (KEFLEX) 500 MG CAPSULE    Take 1 capsule (500 mg) by mouth 3 times daily for 10 days    HYDROCODONE-ACETAMINOPHEN (NORCO) 5-325 MG TABLET    Take 1 tablet by mouth every 6 hours as needed for pain    ONDANSETRON (ZOFRAN ODT) 4 MG ODT TAB    Take 1 tablet (4 mg) by mouth every 8 hours as needed for nausea          Scribe Disclosure:  I, Bernadine Singh, am serving as a scribe at 10:46 AM on 3/26/2023 to document services personally performed by  Terrance Harmon MD based on my observations and the provider's statements to me.    3/26/2023   Terrance Harmon MD Fitzgerald, Michael Maxwell Kreofsky, MD  03/26/23 1558

## 2023-03-26 NOTE — PATIENT INSTRUCTIONS
Go to the [facility] emergency room for further evaluation of the severe left-sided pelvic pain.

## 2023-03-26 NOTE — DISCHARGE INSTRUCTIONS
As we discussed, your urinalysis does show that you have an infection in your urine, and given the fact that you have some left-sided flank pain as well, we are presuming that this is an early kidney infection.  Your vital signs and laboratory exams are reassuring, however you still do need to take antibiotics for 10 days, starting tomorrow.  We gave you today's antibiotics through an IV in the emergency room.  Please come back to the ER immediately with any vomiting or pain is not controlled with the medication that we have given you, or with any other concerns.  To be certain that you see your regular doctor in the next 4 to 5 days for repeat checkup to make sure things are getting better.  Come back if you believe that you are getting worse in any way

## 2023-03-27 NOTE — RESULT ENCOUNTER NOTE
[facility] Emergency Dept discharge antibiotic (if prescribed): Cephalexin (Keflex) 500 mg capsule, 1 capsule (500 mg) by mouth 3 times daily for 10 days.   Date of Rx (if applicable):  3/26/23  No changes in treatment per [facility] ED Lab Result Urine culture protocol.

## 2023-03-28 LAB — BACTERIA UR CULT: ABNORMAL

## 2023-08-30 ENCOUNTER — HOSPITAL ENCOUNTER (EMERGENCY)
Facility: CLINIC | Age: 38
Discharge: HOME | End: 2023-08-31
Attending: EMERGENCY MEDICINE | Admitting: EMERGENCY MEDICINE
Payer: COMMERCIAL

## 2023-08-30 VITALS
HEART RATE: 98 BPM | DIASTOLIC BLOOD PRESSURE: 100 MMHG | SYSTOLIC BLOOD PRESSURE: 143 MMHG | TEMPERATURE: 98.7 F | RESPIRATION RATE: 18 BRPM | OXYGEN SATURATION: 99 %

## 2023-08-30 DIAGNOSIS — F41.8 SITUATIONAL ANXIETY: ICD-10-CM

## 2023-08-30 PROCEDURE — 99285 EMERGENCY DEPT VISIT HI MDM: CPT | Mod: 25

## 2023-08-30 NOTE — TELEPHONE ENCOUNTER
8/30/2023  Re Soto    PROCEDURE PERFORMED:   Colonoscopy    PRE-OP DIAGNOSIS/ INDICATION FOR PROCEDURE:   History of colon polyps       POST- OP DIAGNOSIS:   1. Normal appearance of the colonic mucosa  2. Sigmoid diverticulosis.   3. Internal hemorrhoids     Surgeon(s): Rene Medina MD  Phone Number: [phone]                       Surgeon(s) and Role:     * Rene Medina MD - Primary    Assistant(s): N/A    Assistant Tasks: N/A                                   Complications: None     Specimens Removed:  None     Estimated Blood Loss: N/A     Implants:  None     Anesthesia Type:  IV sedation  Fentanyl 125 mcg IV and Versed 6 mg IV given in titrated and incremental fashion by the RN directly supervised by the physician. Sedation time was 29 minutes.    DESCRIPTION OF PROCEDURE:   After risks, benefits and alternatives of procedure, informed consent was obtained. Digital rectal exam was performed and did not reveal any significant abnormalities. The pediatric colonoscope was introduced to the anus and advanced through a good prep to the cecum. The prep was complete (Hardin Bowel Preperation scale is greater than or equal to 6 with no individual segment score less than 2). The cecum was identified by ileocecal valve and appendiceal orifice. Instrument was withdrawn as the mucosa was examined. Withdrawal time was 8 minutes.      FINDINGS:   Cecum, ascending, transverse, descending colon were normal. There were scattered sigmoid diverticula. Retroflexion in the rectum revealed internal hemorrhoids. Scope was withdrawn, procedure was terminated.     ENDOSCOPIC IMPRESSION:   1. Normal appearance of the colonic mucosa  2. Sigmoid diverticulosis   3. Internal hemorrhoids    RECOMMENDATION:   1. Usual postprocedural monitoring   2. High fiber diet, avoid constipation.   3. Repeat surveillance colonoscopy in 5 years     Thank you very much for allowing me to participate in the management of this patient.  Patient calling reporting she was seen 9/3/19 for ear infection of right ear. Ear pain has resolved. Patient reporting ongoing decreased hearing in right ear. Afebrile.   Per guidelines advised to be seen with in 2 weeks. Caller verbalized understanding. Denies further questions.    Shayna Mcleod RN  [facility]        Reason for Disposition    Decreased hearing following an ear infection    Additional Information    Negative: [1] Ringing in the ears (tinnitus) AND [2] taking aspirin AND [3] dosage sounds high (i.e., > 1500 mg/day)    Negative: Patient sounds very sick or weak to the triager    Negative: [1] Hearing loss in one or both ears AND [2] sudden onset AND [3] present now    Negative: [1] SEVERE ear pain AND [2] not improved 2 hours after ibuprofen    Negative: Earache    Negative: Decreased hearing followed sudden, extremely loud noise (e.g., explosion, not just loud concert)    Negative: [1] Decreased hearing AND [2] taking medication that can damage hearing (i.e., gentamycin, tobramycin, furosemide, ethacrynic acid, cisplatin, quinidine)    Negative: [1] Tinnitus (ringing, hissing, beating) AND [2] in both ears    Negative: [1] Decreased hearing in 1 ear AND [2] gradual onset    Negative: Recurrent episodes of hearing loss, dizziness, and ringing in the ear    Negative: [1] Tinnitus (ringing, hissing, beating) AND [2] only or mainly in 1 ear    Negative: [1] Tinnitus (ringing, hissing, beating) AND [2] interferes with work, school, or sleep    Negative: Followed an ear injury    Negative: Decreased hearing with nasal allergies    Negative: Part of a cold    Negative: Followed air travel or mountain driving    Negative: See something in ear canal    Negative: Earwax, questions about    Negative: Dizziness is main symptom    Negative: Tinnitus is main symptom    Protocols used: HEARING LOSS OR CHANGE-A-AH

## 2023-08-31 PROBLEM — F33.9 MAJOR DEPRESSIVE DISORDER, RECURRENT, UNSPECIFIED (H): Status: ACTIVE | Noted: 2023-08-31

## 2023-08-31 PROBLEM — F41.1 GENERALIZED ANXIETY DISORDER: Status: ACTIVE | Noted: 2023-08-31

## 2023-08-31 PROBLEM — F43.10 POST-TRAUMATIC STRESS DISORDER, UNSPECIFIED: Status: ACTIVE | Noted: 2023-08-31

## 2023-08-31 PROCEDURE — 90791 PSYCH DIAGNOSTIC EVALUATION: CPT

## 2023-08-31 PROCEDURE — 250N000013 HC RX MED GY IP 250 OP 250 PS 637: Performed by: EMERGENCY MEDICINE

## 2023-08-31 RX ORDER — HYDROXYZINE HYDROCHLORIDE 25 MG/1
25 TABLET, FILM COATED ORAL ONCE
Status: COMPLETED | OUTPATIENT
Start: 2023-08-31 | End: 2023-08-31

## 2023-08-31 RX ORDER — HYDROXYZINE HYDROCHLORIDE 25 MG/1
25 TABLET, FILM COATED ORAL 3 TIMES DAILY PRN
Qty: 15 TABLET | Refills: 0 | Status: SHIPPED | OUTPATIENT
Start: 2023-08-31 | End: 2024-09-12

## 2023-08-31 RX ADMIN — HYDROXYZINE HYDROCHLORIDE 25 MG: 25 TABLET, FILM COATED ORAL at 00:19

## 2023-08-31 ASSESSMENT — ACTIVITIES OF DAILY LIVING (ADL): ADLS_ACUITY_SCORE: 35

## 2023-08-31 NOTE — ED TRIAGE NOTES
Pt to ER with c/o depression, pt is going thru a divorce and child custody, feeling panicked
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
## 2023-09-26 ENCOUNTER — OFFICE VISIT (OUTPATIENT)
Dept: MIDWIFE SERVICES | Facility: CLINIC | Age: 38
End: 2023-09-26
Payer: COMMERCIAL

## 2023-09-26 VITALS — SYSTOLIC BLOOD PRESSURE: 118 MMHG | BODY MASS INDEX: 25.46 KG/M2 | WEIGHT: 153 LBS | DIASTOLIC BLOOD PRESSURE: 70 MMHG

## 2023-09-26 DIAGNOSIS — Z11.59 NEED FOR HEPATITIS C SCREENING TEST: ICD-10-CM

## 2023-09-26 DIAGNOSIS — Z11.8 SCREENING FOR CHLAMYDIAL DISEASE: ICD-10-CM

## 2023-09-26 DIAGNOSIS — Z11.4 ENCOUNTER FOR SCREENING FOR HIV: ICD-10-CM

## 2023-09-26 DIAGNOSIS — Z11.59 NEED FOR HEPATITIS B SCREENING TEST: ICD-10-CM

## 2023-09-26 DIAGNOSIS — Z11.3 SCREEN FOR STD (SEXUALLY TRANSMITTED DISEASE): Primary | ICD-10-CM

## 2023-09-26 PROCEDURE — 36415 COLL VENOUS BLD VENIPUNCTURE: CPT

## 2023-09-26 PROCEDURE — 86803 HEPATITIS C AB TEST: CPT

## 2023-09-26 PROCEDURE — 87491 CHLMYD TRACH DNA AMP PROBE: CPT

## 2023-09-26 PROCEDURE — 87389 HIV-1 AG W/HIV-1&-2 AB AG IA: CPT

## 2023-09-26 PROCEDURE — 99213 OFFICE O/P EST LOW 20 MIN: CPT

## 2023-09-26 PROCEDURE — 87340 HEPATITIS B SURFACE AG IA: CPT

## 2023-09-26 PROCEDURE — 86780 TREPONEMA PALLIDUM: CPT

## 2023-09-26 PROCEDURE — 87591 N.GONORRHOEAE DNA AMP PROB: CPT

## 2023-09-27 LAB
C TRACH DNA SPEC QL NAA+PROBE: NEGATIVE
HBV SURFACE AG SERPL QL IA: NONREACTIVE
HCV AB SERPL QL IA: NONREACTIVE
HIV 1+2 AB+HIV1 P24 AG SERPL QL IA: NONREACTIVE
N GONORRHOEA DNA SPEC QL NAA+PROBE: NEGATIVE
T PALLIDUM AB SER QL: NONREACTIVE

## 2024-06-22 ENCOUNTER — HEALTH MAINTENANCE LETTER (OUTPATIENT)
Age: 39
End: 2024-06-22

## 2024-08-31 ENCOUNTER — HEALTH MAINTENANCE LETTER (OUTPATIENT)
Age: 39
End: 2024-08-31

## 2024-09-12 ENCOUNTER — OFFICE VISIT (OUTPATIENT)
Dept: OBGYN | Facility: CLINIC | Age: 39
End: 2024-09-12
Payer: COMMERCIAL

## 2024-09-12 VITALS
DIASTOLIC BLOOD PRESSURE: 64 MMHG | WEIGHT: 168 LBS | SYSTOLIC BLOOD PRESSURE: 110 MMHG | BODY MASS INDEX: 29.77 KG/M2 | HEIGHT: 63 IN

## 2024-09-12 DIAGNOSIS — Z13.29 SCREENING FOR THYROID DISORDER: ICD-10-CM

## 2024-09-12 DIAGNOSIS — Z13.0 SCREENING FOR DEFICIENCY ANEMIA: ICD-10-CM

## 2024-09-12 DIAGNOSIS — Z01.419 ENCOUNTER FOR GYNECOLOGICAL EXAMINATION WITHOUT ABNORMAL FINDING: Primary | ICD-10-CM

## 2024-09-12 DIAGNOSIS — Z12.31 VISIT FOR SCREENING MAMMOGRAM: ICD-10-CM

## 2024-09-12 DIAGNOSIS — Z13.21 ENCOUNTER FOR VITAMIN DEFICIENCY SCREENING: ICD-10-CM

## 2024-09-12 DIAGNOSIS — Z13.1 SCREENING FOR DIABETES MELLITUS: ICD-10-CM

## 2024-09-12 LAB
ERYTHROCYTE [DISTWIDTH] IN BLOOD BY AUTOMATED COUNT: 12.4 % (ref 10–15)
HBA1C MFR BLD: 5.1 % (ref 0–5.6)
HCT VFR BLD AUTO: 39.6 % (ref 35–47)
HGB BLD-MCNC: 13.1 G/DL (ref 11.7–15.7)
MCH RBC QN AUTO: 29.9 PG (ref 26.5–33)
MCHC RBC AUTO-ENTMCNC: 33.1 G/DL (ref 31.5–36.5)
MCV RBC AUTO: 90 FL (ref 78–100)
PLATELET # BLD AUTO: 309 10E3/UL (ref 150–450)
RBC # BLD AUTO: 4.38 10E6/UL (ref 3.8–5.2)
WBC # BLD AUTO: 6.7 10E3/UL (ref 4–11)

## 2024-09-12 PROCEDURE — 85027 COMPLETE CBC AUTOMATED: CPT | Performed by: OBSTETRICS & GYNECOLOGY

## 2024-09-12 PROCEDURE — 36415 COLL VENOUS BLD VENIPUNCTURE: CPT | Performed by: OBSTETRICS & GYNECOLOGY

## 2024-09-12 PROCEDURE — 99396 PREV VISIT EST AGE 40-64: CPT | Performed by: OBSTETRICS & GYNECOLOGY

## 2024-09-12 PROCEDURE — 82306 VITAMIN D 25 HYDROXY: CPT | Performed by: OBSTETRICS & GYNECOLOGY

## 2024-09-12 PROCEDURE — 84443 ASSAY THYROID STIM HORMONE: CPT | Performed by: OBSTETRICS & GYNECOLOGY

## 2024-09-12 PROCEDURE — 83036 HEMOGLOBIN GLYCOSYLATED A1C: CPT | Performed by: OBSTETRICS & GYNECOLOGY

## 2024-09-12 PROCEDURE — 82728 ASSAY OF FERRITIN: CPT | Performed by: OBSTETRICS & GYNECOLOGY

## 2024-09-12 PROCEDURE — 82607 VITAMIN B-12: CPT | Performed by: OBSTETRICS & GYNECOLOGY

## 2024-09-12 ASSESSMENT — PATIENT HEALTH QUESTIONNAIRE - PHQ9
5. POOR APPETITE OR OVEREATING: SEVERAL DAYS
SUM OF ALL RESPONSES TO PHQ QUESTIONS 1-9: 3

## 2024-09-12 ASSESSMENT — ANXIETY QUESTIONNAIRES
3. WORRYING TOO MUCH ABOUT DIFFERENT THINGS: NOT AT ALL
6. BECOMING EASILY ANNOYED OR IRRITABLE: NOT AT ALL
1. FEELING NERVOUS, ANXIOUS, OR ON EDGE: NOT AT ALL
IF YOU CHECKED OFF ANY PROBLEMS ON THIS QUESTIONNAIRE, HOW DIFFICULT HAVE THESE PROBLEMS MADE IT FOR YOU TO DO YOUR WORK, TAKE CARE OF THINGS AT HOME, OR GET ALONG WITH OTHER PEOPLE: NOT DIFFICULT AT ALL
5. BEING SO RESTLESS THAT IT IS HARD TO SIT STILL: NOT AT ALL
GAD7 TOTAL SCORE: 1
7. FEELING AFRAID AS IF SOMETHING AWFUL MIGHT HAPPEN: NOT AT ALL
GAD7 TOTAL SCORE: 1
2. NOT BEING ABLE TO STOP OR CONTROL WORRYING: NOT AT ALL

## 2024-09-12 NOTE — PROGRESS NOTES
Felipa is a 39 year old  female who presents for annual exam.     Besides routine health maintenance, she would like to discuss numbness in arms and throbbing in thighs in the morning.    HPI:  The patient's PCP is none.  Felipa presents for an annual exam. She is having certain neurological symptoms. She is having bilateral arm numbness. She has left thigh muscle twitching and she is also having bilateral foot pain after prolonged standing. She would like to be screened for diabetes today as she had an elevated 1 hour GCT 2 years prior during her pregnancy.       GYNECOLOGIC HISTORY:    Patient's last menstrual period was 2024.    Regular menses? yes  Menses every 28 days.  Length of menses: 4 days    Her current contraception method is: not sexually active.  She  reports that she has never smoked. She has never used smokeless tobacco.    Patient is not sexually active.  STD testing offered?  Declined    Last PHQ-9 score on record =       2024     3:16 PM   PHQ-9 SCORE   PHQ-9 Total Score 3     Last GAD7 score on record =       2024     3:16 PM   ZORAIDA-7 SCORE   Total Score 1     Alcohol Score = 0    HEALTH MAINTENANCE:    Overdue       Never done DEPRESSION ACTION PLAN (Once)     Never done MAMMO SCREENING (Every 2 Years)     SEP 14  2023 PHQ-9 (Every 6 Months)  Last completed: Mar 14, 2023     NOV 20  2023 ADVANCE CARE PLANNING (Every 5 Years)  Last completed: 2018     MAR 14  2024 YEARLY PREVENTIVE VISIT (Yearly)  Last completed: Mar 14, 2023     SEP 1  2024 INFLUENZA VACCINE (1)  Last completed: Dec 20, 2021     SEP 1  2024 COVID-19 Vaccine ( season)  Last completed: 2022         Upcoming       MAR 26  2026 GLUCOSE (Every 3 Years)  Last completed: Mar 26, 2023     DEC 20  2026 HPV TEST (Once)  Last completed: Dec 20, 2021     DEC 20  2026 PAP (Every 5 Years)  Last completed: Dec 20, 2021     MAR 14  2028 LIPID (Every 5 Years)  Last completed: Mar 14, 2023      2032 DTAP/TDAP/TD IMMUNIZATION (9 - Td or Tdap)  Last completed: 2022       Health maintenance updated:  yes    HISTORY:  OB History    Para Term  AB Living   3 3 3 0 0 3   SAB IAB Ectopic Multiple Live Births   0 0 0 0 3      # Outcome Date GA Lbr [place]/2nd Weight Sex Type Anes PTL Lv   3 Term 22 37w3d  2.98 kg (6 lb 9.1 oz) F CS-LTranv   CHERY      Name: Tameka      Apgar1: 9  Apgar5: 9   2 Term 18 38w2d  3.05 kg (6 lb 11.6 oz) M CS-LTranv EPI  CHERY      Complications: Other Excessive Bleeding, Cephalopelvic Disproportion      Name: Roderick      Apgar1: 8     1 Term 03 40w0d  3.26 kg (7 lb 3 oz) F CS-LTranv   CHERY      Birth Comments: epidural      Name: Lissett       Patient Active Problem List   Diagnosis    Tension headache 2ndary to contusion of post occiput 10-17-14     HSV-2 infection    Back pain    Supervision of high risk pregnancy in first trimester    Group B Streptococcus urinary tract infection affecting pregnancy in first trimester    LGSIL on Pap smear of cervix    Acne    Vaginal bleeding    Labor and delivery, indication for care     delivery delivered    Major depressive disorder, recurrent, unspecified (H24)    Generalized anxiety disorder    Post-traumatic stress disorder, unspecified     Past Surgical History:   Procedure Laterality Date     SECTION N/A 2018    Procedure:  SECTION;  Surgeon: Jodi Joel MD;  Location:  L+D     SECTION N/A 2022    Procedure: REPEAT  SECTION;  Surgeon: Jodi Joel MD;  Location:  L+D    [facility]  DELIVERY ONLY  03    , Low Cervical      Social History     Tobacco Use    Smoking status: Never    Smokeless tobacco: Never   Substance Use Topics    Alcohol use: No      Problem (# of Occurrences) Relation (Name,Age of Onset)    Hypertension (1) Maternal Grandmother    Family History Negative (2) Mother, Father           "    No current outpatient medications on file.     No current facility-administered medications for this visit.     Allergies   Allergen Reactions    No Known Allergies        Past medical, surgical, social and family histories were reviewed and updated in EPIC.    ROS:   No urinary frequency or dysuria, bladder or kidney problems    EXAM:  /64   Ht 1.6 m (5' 3\")   Wt 76.2 kg (168 lb)   LMP 09/12/2024   BMI 29.76 kg/m     BMI: Body mass index is 29.76 kg/m .    PHYSICAL EXAM:  Constitutional:   Appearance: Well nourished, well developed, alert, in no acute distress  Neck:  Lymph Nodes:  No lymphadenopathy present    Thyroid:  Gland size normal, nontender, no nodules or masses present  on palpation  Chest:  Respiratory Effort:  Breathing unlabored  Cardiovascular:    Heart: Auscultation:  Regular rate, normal rhythm, no murmurs present  Breasts: Inspection of Breasts:  No lymphadenopathy present., Palpation of Breasts and Axillae:  No masses present on palpation, no breast tenderness., Axillary Lymph Nodes:  No lymphadenopathy present., and No nodularity, asymmetry or nipple discharge bilaterally.  Gastrointestinal:   Abdominal Examination:  Abdomen nontender to palpation, tone normal without rigidity or guarding, no masses present, umbilicus without lesions   Liver and Spleen:  No hepatomegaly present, liver nontender to palpation    Hernias:  No hernias present  Lymphatic: Lymph Nodes:  No other lymphadenopathy present  Skin:  General Inspection:  No rashes present, no lesions present, no areas of  discoloration  Neurologic:    Mental Status:  Oriented X3.  Normal strength and tone, sensory exam                grossly normal, mentation intact and speech normal.    Psychiatric:   Mentation appears normal and affect normal/bright.         No Pelvic Exam performed as pt declined due to menses    COUNSELING:   Reviewed preventive health counseling, as reflected in patient instructions    BMI: Body mass index " is 29.76 kg/m .      ASSESSMENT:  39 year old female with satisfactory annual exam.    ICD-10-CM    1. Encounter for gynecological examination without abnormal finding  Z01.419       2. Visit for screening mammogram  Z12.31 MA Screen Bilateral w/M[place]us      3. Screening for deficiency anemia  Z13.0 CBC with platelets     Ferritin     Vitamin B12     CBC with platelets     Ferritin     Vitamin B12      4. Screening for diabetes mellitus  Z13.1 Hemoglobin A1c     Hemoglobin A1c      5. Screening for thyroid disorder  Z13.29 TSH with free T4 reflex     TSH with free T4 reflex      6. Encounter for vitamin deficiency screening  Z13.21 Vitamin D Deficiency     Vitamin D Deficiency          PLAN:  I recommend vitamin B12, ferritin and vitamin D levels today. I also recommend a hemoglobin A1C. If all of her labs are within normal limits then I recommend seeing a Neurologist within her insurance network.    Jodi Joel MD

## 2024-09-13 LAB
FERRITIN SERPL-MCNC: 28 NG/ML (ref 6–175)
TSH SERPL DL<=0.005 MIU/L-ACNC: 0.81 UIU/ML (ref 0.3–4.2)
VIT B12 SERPL-MCNC: 522 PG/ML (ref 232–1245)
VIT D+METAB SERPL-MCNC: 22 NG/ML (ref 20–50)

## 2024-10-30 ENCOUNTER — ANCILLARY PROCEDURE (OUTPATIENT)
Dept: GENERAL RADIOLOGY | Facility: CLINIC | Age: 39
End: 2024-10-30
Attending: PHYSICIAN ASSISTANT
Payer: COMMERCIAL

## 2024-10-30 ENCOUNTER — OFFICE VISIT (OUTPATIENT)
Dept: URGENT CARE | Facility: URGENT CARE | Age: 39
End: 2024-10-30
Payer: COMMERCIAL

## 2024-10-30 VITALS
SYSTOLIC BLOOD PRESSURE: 116 MMHG | TEMPERATURE: 99 F | HEART RATE: 76 BPM | RESPIRATION RATE: 16 BRPM | DIASTOLIC BLOOD PRESSURE: 59 MMHG | OXYGEN SATURATION: 99 %

## 2024-10-30 DIAGNOSIS — M25.562 ACUTE PAIN OF LEFT KNEE: Primary | ICD-10-CM

## 2024-10-30 DIAGNOSIS — M25.562 ACUTE PAIN OF LEFT KNEE: ICD-10-CM

## 2024-10-30 PROCEDURE — 99214 OFFICE O/P EST MOD 30 MIN: CPT | Performed by: PHYSICIAN ASSISTANT

## 2024-10-30 PROCEDURE — 73562 X-RAY EXAM OF KNEE 3: CPT | Mod: TC | Performed by: RADIOLOGY

## 2024-10-30 ASSESSMENT — PAIN SCALES - GENERAL: PAINLEVEL_OUTOF10: EXTREME PAIN (8)

## 2024-10-30 NOTE — LETTER
October 30, 2024      Felipa RELL Aidan  [id number]  [place] [address]        To Whom It May Concern:    Felipa Bermudez was seen in clinic today.        Sincerely,        Lola Broderick PA-C

## 2024-10-30 NOTE — PROGRESS NOTES
URGENT CARE VISIT:    SUBJECTIVE:   Chief Complaint   Patient presents with    Knee Pain     Patient here with complaint of left knee pain with no known cause that started yesterday morning. States she was having pain yesterday but states she woke around 4 am to use the bathroom and the pain was intense and there seemed to be a bump on the lateral side of the knee which she massaged for a bit and seemed to make the bump a little better but the pain is still there.       Felipa Bermudez is a 39 year old female who presents with a chief complaint of left knee pain and swelling.  Symptoms began 1 day(s) ago, are moderate and sudden onset  No known injury. She walks a lot at work.   Pain exacerbated by walking and movement. Relieved by rest.  She treated it initially with no therapy. This is the first time this type of injury has occurred to this patient.     PMH:   Past Medical History:   Diagnosis Date    Herpes     LGSIL on Pap smear of cervix 5/19/2004 5/7/03 NIL 5/19/04 LSIL 6/9/04 Colpo bx MANUEL I, ECC acellular 1/5/05 LSIL 1/26/05 Colpo ECC neg 1/31/14 NIL (Care Everywhere) 4/23/18 NIL pap, neg HPV 12/20/21 NIL pap, neg HPV. Plan: await provider     Allergies: No known allergies   Medications:   Current Outpatient Medications   Medication Sig Dispense Refill    diclofenac (VOLTAREN) 1 % topical gel Apply 4 g topically 4 times daily for 7 days. 112 g 0     Social History:   Social History     Tobacco Use    Smoking status: Never    Smokeless tobacco: Never   Substance Use Topics    Alcohol use: No       ROS:  Review of systems negative except as stated above.    OBJECTIVE:  /59 (BP Location: Left arm, Patient Position: Sitting, Cuff Size: Adult Regular)   Pulse 76   Temp 99  F (37.2  C) (Tympanic)   Resp 16   LMP 09/12/2024   SpO2 99%   Breastfeeding Yes   GENERAL APPEARANCE: healthy, alert and no distress  MUSCULOSKELETAL: mild to moderate TTP over left lateral joint line. FROM. Walks with a  limp.  EXTREMITIES: peripheral pulses normal  SKIN: Mild edema over left lateral joint line.  NEURO: sensation intact       IMAGING:  Results for orders placed or performed in visit on 10/30/24   XR Knee Left 3 Views     Status: None    Narrative    KNEE THREE VIEWS LEFT  10/30/2024 10:52 AM     HISTORY: Acute pain of left knee  COMPARISON: None.      Impression    IMPRESSION: No acute fracture or malalignment. There is normal joint  spacing. Trace knee joint effusion.    BEENA GUZMÁN MD         SYSTEM ID:  [id number]       ASSESSMENT:    ICD-10-CM    1. Acute pain of left knee  M25.562 XR Knee Left 3 Views     Orthopedic  Referral     diclofenac (VOLTAREN) 1 % topical gel          PLAN:  30 minutes spent by me on the date of the encounter doing chart review, review of outside records, review of test results, interpretation of tests, patient visit, and documentation   Patient Instructions   Patient was educated on the natural course of injury.  No acute fracture or dislocation. Normal joint space. Conservative measures discussed including rest, ice, compression, elevation, and over-the-counter analgesics (Tylenol) as needed. See your primary care provider if symptoms worsen or do not improve in 7 days. Seek emergency care if you develop severe pain/swelling, inability to move extremity, skin paleness, or weakness.     Patient verbalized understanding and is agreeable to plan. The patient was discharged ambulatory and in stable condition.    Lola Broderick PA-C on 10/30/2024 at 11:16 AM

## 2024-10-30 NOTE — PATIENT INSTRUCTIONS
Patient was educated on the natural course of injury.  No acute fracture or dislocation. Normal joint space. Conservative measures discussed including rest, ice, compression, elevation, and over-the-counter analgesics (Tylenol) as needed. See your primary care provider if symptoms worsen or do not improve in 7 days. Seek emergency care if you develop severe pain/swelling, inability to move extremity, skin paleness, or weakness.

## 2024-12-12 ENCOUNTER — PATIENT OUTREACH (OUTPATIENT)
Dept: CARE COORDINATION | Facility: CLINIC | Age: 39
End: 2024-12-12
Payer: COMMERCIAL

## 2025-01-22 ENCOUNTER — HOSPITAL ENCOUNTER (OUTPATIENT)
Dept: MAMMOGRAPHY | Facility: CLINIC | Age: 40
Discharge: HOME | End: 2025-01-22
Attending: OBSTETRICS & GYNECOLOGY
Payer: COMMERCIAL

## 2025-01-22 DIAGNOSIS — Z12.31 VISIT FOR SCREENING MAMMOGRAM: ICD-10-CM

## 2025-01-22 PROCEDURE — 77067 SCR MAMMO BI INCL CAD: CPT

## 2025-01-22 PROCEDURE — 77063 BREAST TOMOSYNTHESIS BI: CPT

## 2025-06-11 ENCOUNTER — OFFICE VISIT (OUTPATIENT)
Dept: FAMILY MEDICINE | Facility: CLINIC | Age: 40
End: 2025-06-11
Payer: COMMERCIAL

## 2025-06-11 VITALS
DIASTOLIC BLOOD PRESSURE: 60 MMHG | BODY MASS INDEX: 32.51 KG/M2 | HEIGHT: 63 IN | OXYGEN SATURATION: 99 % | RESPIRATION RATE: 16 BRPM | SYSTOLIC BLOOD PRESSURE: 94 MMHG | WEIGHT: 183.5 LBS | TEMPERATURE: 98.1 F | HEART RATE: 72 BPM

## 2025-06-11 DIAGNOSIS — M72.2 PLANTAR FASCIITIS: Primary | ICD-10-CM

## 2025-06-11 PROCEDURE — 99213 OFFICE O/P EST LOW 20 MIN: CPT

## 2025-06-11 RX ORDER — IBUPROFEN 200 MG
600 TABLET ORAL 2 TIMES DAILY
COMMUNITY
Start: 2025-06-11 | End: 2025-06-18

## 2025-06-11 ASSESSMENT — ENCOUNTER SYMPTOMS
JOINT SWELLING: 0
CONSTITUTIONAL NEGATIVE: 1
ARTHRALGIAS: 0
FEVER: 0

## 2025-06-11 ASSESSMENT — PATIENT HEALTH QUESTIONNAIRE - PHQ9
SUM OF ALL RESPONSES TO PHQ QUESTIONS 1-9: 2
SUM OF ALL RESPONSES TO PHQ QUESTIONS 1-9: 2
10. IF YOU CHECKED OFF ANY PROBLEMS, HOW DIFFICULT HAVE THESE PROBLEMS MADE IT FOR YOU TO DO YOUR WORK, TAKE CARE OF THINGS AT HOME, OR GET ALONG WITH OTHER PEOPLE: NOT DIFFICULT AT ALL

## 2025-06-11 NOTE — PROGRESS NOTES
"  Assessment & Plan     Plantar fasciitis  - Plantar fasciitis due to irritation of the tendons, likely from being on feet all day. Increased risk due to weight gain and lack of supportive footwear. Normal arches noted.  - Recommend exercises and stretches to be done at home, including calf stretches and toe exercises. Ice massage suggested using a water bottle or lacrosse ball. Referral to podiatry for potential orthotics and further management. Ibuprofen recommended first thing in the morning and possibly at the end of the day, with food. Follow-up if not improved within 8 weeks.     - Orthopedic  Referral  - ibuprofen (ADVIL/MOTRIN) 200 MG tablet            BMI  Estimated body mass index is 32.51 kg/m  as calculated from the following:    Height as of this encounter: 1.6 m (5' 3\").    Weight as of this encounter: 83.2 kg (183 lb 8 oz).             Subjective   Felipa is a 39 year old, presenting for the following health issues:  Musculoskeletal Problem (Pt states that she has been having pain in both heels for over a month.)    History of Present Illness       Reason for visit:  Pain and disconfort  Symptom onset:  More than a month  Symptoms include:  Pain on both heels  Symptom intensity:  Moderate  Symptom progression:  Worsening  Had these symptoms before:  No  What makes it worse:  Morning n night  What makes it better:  No   She is taking medications regularly.   Felipa ZACARIAS Aidan, 39 years old, female  - Bilateral foot pain, initially started in the right foot and now worse in the left foot  - No specific injury reported  - Swelling observed on the top of the feet, veins popping out after work  - Pain described as stepping on Legos in the morning, worsens throughout the day, limping by the end of the day  - Works in customer service, on feet all day  - No exercise routine, wears slippers at home  - Weight gain mentioned  - Breastfeeding once at night            Review of Systems " "  Constitutional: Negative.  Negative for fever.   Musculoskeletal:  Negative for arthralgias and joint swelling.   Skin: Negative.            Objective    BP 94/60 (BP Location: Right arm, Patient Position: Sitting, Cuff Size: Adult Large)   Pulse 72   Temp 98.1  F (36.7  C) (Oral)   Resp 16   Ht 1.6 m (5' 3\")   Wt 83.2 kg (183 lb 8 oz)   LMP 05/26/2025 (Approximate)   SpO2 99%   BMI 32.51 kg/m    Body mass index is 32.51 kg/m .  Physical Exam  Vitals and nursing note reviewed.   Constitutional:       General: She is not in acute distress.     Appearance: Normal appearance. She is not ill-appearing, toxic-appearing or diaphoretic.   Cardiovascular:      Rate and Rhythm: Normal rate.      Pulses:           Dorsalis pedis pulses are 2+ on the right side and 2+ on the left side.        Posterior tibial pulses are 2+ on the right side and 2+ on the left side.   Pulmonary:      Effort: Pulmonary effort is normal.   Musculoskeletal:      Cervical back: Normal range of motion and neck supple.      Right foot: Normal range of motion. No deformity, bunion, Charcot foot, foot drop or prominent metatarsal heads.      Left foot: Normal range of motion. No deformity, bunion, Charcot foot, foot drop or prominent metatarsal heads.   Feet:      Right foot:      Skin integrity: Skin integrity normal.      Toenail Condition: Right toenails are normal.      Left foot:      Skin integrity: Skin integrity normal.      Toenail Condition: Left toenails are normal.      Comments: Negative cain bilaterally  Tender to palpation, especially over arches and mildly over heel  Skin:     General: Skin is warm and dry.      Capillary Refill: Capillary refill takes less than 2 seconds.      Findings: No erythema or rash.   Neurological:      General: No focal deficit present.      Mental Status: She is alert.                    Signed Electronically by: OSWALDO Hinojosa CNP    "

## 2025-06-12 ENCOUNTER — PATIENT OUTREACH (OUTPATIENT)
Dept: CARE COORDINATION | Facility: CLINIC | Age: 40
End: 2025-06-12
Payer: COMMERCIAL

## 2025-06-16 ENCOUNTER — PATIENT OUTREACH (OUTPATIENT)
Dept: CARE COORDINATION | Facility: CLINIC | Age: 40
End: 2025-06-16
Payer: COMMERCIAL

## 2025-06-23 ENCOUNTER — PATIENT OUTREACH (OUTPATIENT)
Dept: CARE COORDINATION | Facility: CLINIC | Age: 40
End: 2025-06-23
Payer: COMMERCIAL

## 2025-06-25 ENCOUNTER — PATIENT OUTREACH (OUTPATIENT)
Dept: CARE COORDINATION | Facility: CLINIC | Age: 40
End: 2025-06-25
Payer: COMMERCIAL

## 2025-08-13 ENCOUNTER — PATIENT OUTREACH (OUTPATIENT)
Dept: CARE COORDINATION | Facility: CLINIC | Age: 40
End: 2025-08-13
Payer: COMMERCIAL

## 2025-08-27 ENCOUNTER — PATIENT OUTREACH (OUTPATIENT)
Dept: CARE COORDINATION | Facility: CLINIC | Age: 40
End: 2025-08-27
Payer: COMMERCIAL

## (undated) DEVICE — DRAPE SHEET REV FOLD 3/4 9349

## (undated) DEVICE — SU VICRYL 0 CT 36" J358H

## (undated) DEVICE — PREP CHLORAPREP 26ML TINTED ORANGE  260815

## (undated) DEVICE — LINEN BABY BLANKET 5434

## (undated) DEVICE — PACK C-SECTION LF PL15OTA83B

## (undated) DEVICE — LIGHT HANDLE X2

## (undated) DEVICE — SURGICEL POWDER ABSORBABLE HEMOSTAT 3GM 3013SP

## (undated) DEVICE — SUCTION CANISTER MEDIVAC LINER 3000ML W/LID 65651-530

## (undated) DEVICE — DRSG TELFA ISLAND 4X10"

## (undated) DEVICE — ESU GROUND PAD UNIVERSAL W/O CORD

## (undated) DEVICE — DRSG STERI STRIP 1/2X4" B1557

## (undated) DEVICE — SOL NACL 0.9% IRRIG 1000ML BOTTLE 07138-09

## (undated) DEVICE — PACK SET-UP STD 9102

## (undated) DEVICE — SOL WATER IRRIG 1000ML BOTTLE 07139-09

## (undated) DEVICE — LINEN TOWEL PACK X5 5464

## (undated) DEVICE — PAD CHUX UNDERPAD 23X24" 7136

## (undated) DEVICE — DRSG KERLIX FLUFFS X5

## (undated) DEVICE — STPL SKIN PROXIMATE 35 WIDE PMW35

## (undated) DEVICE — BLADE CLIPPER 4406

## (undated) RX ORDER — OXYTOCIN/0.9 % SODIUM CHLORIDE 30/500 ML
PLASTIC BAG, INJECTION (ML) INTRAVENOUS
Status: DISPENSED
Start: 2022-06-20

## (undated) RX ORDER — KETOROLAC TROMETHAMINE 30 MG/ML
INJECTION, SOLUTION INTRAMUSCULAR; INTRAVENOUS
Status: DISPENSED
Start: 2022-06-20

## (undated) RX ORDER — HYDROMORPHONE HYDROCHLORIDE 1 MG/ML
INJECTION, SOLUTION INTRAMUSCULAR; INTRAVENOUS; SUBCUTANEOUS
Status: DISPENSED
Start: 2022-06-20

## (undated) RX ORDER — MORPHINE SULFATE 1 MG/ML
INJECTION, SOLUTION EPIDURAL; INTRATHECAL; INTRAVENOUS
Status: DISPENSED
Start: 2022-06-20